# Patient Record
Sex: FEMALE | Race: WHITE | Employment: OTHER | URBAN - NONMETROPOLITAN AREA
[De-identification: names, ages, dates, MRNs, and addresses within clinical notes are randomized per-mention and may not be internally consistent; named-entity substitution may affect disease eponyms.]

---

## 2017-01-13 PROBLEM — R10.13 EPIGASTRIC PAIN: Status: ACTIVE | Noted: 2017-01-13

## 2017-01-13 PROBLEM — R63.4 WEIGHT LOSS: Status: ACTIVE | Noted: 2017-01-13

## 2017-04-07 ENCOUNTER — HOSPITAL ENCOUNTER (INPATIENT)
Age: 67
LOS: 3 days | Discharge: HOME | DRG: 193 | End: 2017-04-10
Attending: INTERNAL MEDICINE | Admitting: INTERNAL MEDICINE
Payer: MEDICARE

## 2017-04-07 ENCOUNTER — APPOINTMENT (OUTPATIENT)
Dept: GENERAL RADIOLOGY | Age: 67
DRG: 193 | End: 2017-04-07
Attending: INTERNAL MEDICINE
Payer: MEDICARE

## 2017-04-07 DIAGNOSIS — J18.9 CAP (COMMUNITY ACQUIRED PNEUMONIA): Primary | ICD-10-CM

## 2017-04-07 DIAGNOSIS — J96.01 ACUTE RESPIRATORY FAILURE WITH HYPOXIA (HCC): ICD-10-CM

## 2017-04-07 DIAGNOSIS — M41.80 DEXTROSCOLIOSIS: Chronic | ICD-10-CM

## 2017-04-07 LAB
-: ABNORMAL
ABSOLUTE EOS #: 0 K/UL (ref 0–0.4)
ABSOLUTE LYMPH #: 0.7 K/UL (ref 1–4.8)
ABSOLUTE MONO #: 0.6 K/UL (ref 0–1)
ALBUMIN SERPL-MCNC: 3.7 G/DL (ref 3.5–5.2)
ALBUMIN/GLOBULIN RATIO: 1.1 (ref 1–2.5)
ALLEN TEST: ABNORMAL
ALP BLD-CCNC: 44 U/L (ref 35–104)
ALT SERPL-CCNC: 15 U/L (ref 5–33)
AMORPHOUS: ABNORMAL
ANION GAP SERPL CALCULATED.3IONS-SCNC: 13 MMOL/L (ref 9–17)
AST SERPL-CCNC: 22 U/L
BACTERIA: ABNORMAL
BASOPHILS # BLD: 0 % (ref 0–2)
BASOPHILS ABSOLUTE: 0 K/UL (ref 0–0.2)
BILIRUB SERPL-MCNC: 0.41 MG/DL (ref 0.3–1.2)
BILIRUBIN URINE: NEGATIVE
BUN BLDV-MCNC: 13 MG/DL (ref 8–23)
BUN/CREAT BLD: 24 (ref 9–20)
CALCIUM SERPL-MCNC: 8.6 MG/DL (ref 8.6–10.4)
CARBOXYHEMOGLOBIN: ABNORMAL % (ref 0–5)
CASTS UA: ABNORMAL /LPF
CHLORIDE BLD-SCNC: 96 MMOL/L (ref 98–107)
CO2: 28 MMOL/L (ref 20–31)
COLOR: YELLOW
COMMENT UA: ABNORMAL
CREAT SERPL-MCNC: 0.54 MG/DL (ref 0.5–0.9)
CRYSTALS, UA: ABNORMAL /HPF
DIFFERENTIAL TYPE: ABNORMAL
DIRECT EXAM: NORMAL
EOSINOPHILS RELATIVE PERCENT: 0 % (ref 0–8)
EPITHELIAL CELLS UA: ABNORMAL /HPF (ref 0–25)
FIO2: ABNORMAL
GFR AFRICAN AMERICAN: >60 ML/MIN
GFR NON-AFRICAN AMERICAN: >60 ML/MIN
GFR SERPL CREATININE-BSD FRML MDRD: ABNORMAL ML/MIN/{1.73_M2}
GFR SERPL CREATININE-BSD FRML MDRD: ABNORMAL ML/MIN/{1.73_M2}
GLUCOSE BLD-MCNC: 122 MG/DL (ref 70–99)
GLUCOSE URINE: NEGATIVE
HCO3 ARTERIAL: 29.3 MMOL/L (ref 22–26)
HCT VFR BLD CALC: 40.2 % (ref 36–46)
HEMOGLOBIN: 13.4 G/DL (ref 12–16)
KETONES, URINE: NEGATIVE
LEUKOCYTE ESTERASE, URINE: NEGATIVE
LYMPHOCYTES # BLD: 13 % (ref 24–44)
Lab: NORMAL
MCH RBC QN AUTO: 31.8 PG (ref 26–34)
MCHC RBC AUTO-ENTMCNC: 33.3 G/DL (ref 31–37)
MCV RBC AUTO: 95.6 FL (ref 80–100)
METHEMOGLOBIN: ABNORMAL % (ref 0–1.9)
MODE: ABNORMAL
MONOCYTES # BLD: 11 % (ref 0–12)
MUCUS: ABNORMAL
NEGATIVE BASE EXCESS, ART: ABNORMAL MMOL/L (ref 0–2)
NITRITE, URINE: NEGATIVE
NOTIFICATION TIME: ABNORMAL
NOTIFICATION: ABNORMAL
O2 DEVICE/FLOW/%: ABNORMAL
O2 SAT, ARTERIAL: 89.5 % (ref 95–98)
OTHER OBSERVATIONS UA: ABNORMAL
OXYHEMOGLOBIN: ABNORMAL % (ref 95–98)
PATIENT TEMP: ABNORMAL
PCO2 ARTERIAL: 45.2 MMHG (ref 35–45)
PCO2, ART, TEMP ADJ: ABNORMAL
PDW BLD-RTO: 16.5 % (ref 12.1–15.2)
PEEP/CPAP: ABNORMAL
PH ARTERIAL: 7.43 (ref 7.35–7.45)
PH UA: 6 (ref 5–9)
PH, ART, TEMP ADJ: ABNORMAL (ref 7.35–7.45)
PLATELET # BLD: 179 K/UL (ref 140–450)
PLATELET ESTIMATE: ABNORMAL
PMV BLD AUTO: ABNORMAL FL (ref 6–12)
PO2 ARTERIAL: 55.4 MMHG (ref 80–100)
PO2, ART, TEMP ADJ: ABNORMAL MMHG (ref 80–100)
POSITIVE BASE EXCESS, ART: 4.2 MMOL/L (ref 0–2)
POTASSIUM SERPL-SCNC: 3.8 MMOL/L (ref 3.7–5.3)
PROTEIN UA: ABNORMAL
PSV: ABNORMAL
PT. POSITION: ABNORMAL
RBC # BLD: 4.2 M/UL (ref 4–5.2)
RBC # BLD: ABNORMAL 10*6/UL
RBC UA: ABNORMAL /HPF (ref 0–2)
RENAL EPITHELIAL, UA: ABNORMAL /HPF
RESPIRATORY RATE: ABNORMAL
SAMPLE SITE: ABNORMAL
SEG NEUTROPHILS: 76 % (ref 36–66)
SEGMENTED NEUTROPHILS ABSOLUTE COUNT: 4.2 K/UL (ref 1.8–7.7)
SET RATE: ABNORMAL
SODIUM BLD-SCNC: 137 MMOL/L (ref 135–144)
SPECIFIC GRAVITY UA: 1.02 (ref 1.01–1.02)
SPECIMEN DESCRIPTION: NORMAL
STATUS: NORMAL
TEXT FOR RESPIRATORY: ABNORMAL
TOTAL HB: ABNORMAL G/DL (ref 12–16)
TOTAL PROTEIN: 7.1 G/DL (ref 6.4–8.3)
TOTAL RATE: ABNORMAL
TRICHOMONAS: ABNORMAL
TROPONIN INTERP: NORMAL
TROPONIN T: <0.03 NG/ML
TURBIDITY: CLEAR
URINE HGB: ABNORMAL
UROBILINOGEN, URINE: NORMAL
VT: ABNORMAL
WBC # BLD: 5.6 K/UL (ref 3.5–11)
WBC # BLD: ABNORMAL 10*3/UL
WBC UA: ABNORMAL /HPF (ref 0–5)
YEAST: ABNORMAL

## 2017-04-07 PROCEDURE — G8996 SWALLOW CURRENT STATUS: HCPCS

## 2017-04-07 PROCEDURE — 87899 AGENT NOS ASSAY W/OPTIC: CPT

## 2017-04-07 PROCEDURE — 97530 THERAPEUTIC ACTIVITIES: CPT

## 2017-04-07 PROCEDURE — 87449 NOS EACH ORGANISM AG IA: CPT

## 2017-04-07 PROCEDURE — G8978 MOBILITY CURRENT STATUS: HCPCS

## 2017-04-07 PROCEDURE — 6360000002 HC RX W HCPCS: Performed by: PHYSICIAN ASSISTANT

## 2017-04-07 PROCEDURE — 85025 COMPLETE CBC W/AUTO DIFF WBC: CPT

## 2017-04-07 PROCEDURE — G8979 MOBILITY GOAL STATUS: HCPCS

## 2017-04-07 PROCEDURE — G8998 SWALLOW D/C STATUS: HCPCS

## 2017-04-07 PROCEDURE — 6370000000 HC RX 637 (ALT 250 FOR IP): Performed by: PHYSICIAN ASSISTANT

## 2017-04-07 PROCEDURE — 87086 URINE CULTURE/COLONY COUNT: CPT

## 2017-04-07 PROCEDURE — 87070 CULTURE OTHR SPECIMN AEROBIC: CPT

## 2017-04-07 PROCEDURE — 1200000000 HC SEMI PRIVATE

## 2017-04-07 PROCEDURE — 93005 ELECTROCARDIOGRAM TRACING: CPT

## 2017-04-07 PROCEDURE — 80053 COMPREHEN METABOLIC PANEL: CPT

## 2017-04-07 PROCEDURE — 94664 DEMO&/EVAL PT USE INHALER: CPT

## 2017-04-07 PROCEDURE — G8997 SWALLOW GOAL STATUS: HCPCS

## 2017-04-07 PROCEDURE — 82805 BLOOD GASES W/O2 SATURATION: CPT

## 2017-04-07 PROCEDURE — 81001 URINALYSIS AUTO W/SCOPE: CPT

## 2017-04-07 PROCEDURE — 94640 AIRWAY INHALATION TREATMENT: CPT

## 2017-04-07 PROCEDURE — G8988 SELF CARE GOAL STATUS: HCPCS

## 2017-04-07 PROCEDURE — 87040 BLOOD CULTURE FOR BACTERIA: CPT

## 2017-04-07 PROCEDURE — 92610 EVALUATE SWALLOWING FUNCTION: CPT

## 2017-04-07 PROCEDURE — 84484 ASSAY OF TROPONIN QUANT: CPT

## 2017-04-07 PROCEDURE — 94667 MNPJ CHEST WALL 1ST: CPT

## 2017-04-07 PROCEDURE — 36415 COLL VENOUS BLD VENIPUNCTURE: CPT

## 2017-04-07 PROCEDURE — 36600 WITHDRAWAL OF ARTERIAL BLOOD: CPT

## 2017-04-07 PROCEDURE — 2580000003 HC RX 258: Performed by: PHYSICIAN ASSISTANT

## 2017-04-07 PROCEDURE — 94668 MNPJ CHEST WALL SBSQ: CPT

## 2017-04-07 PROCEDURE — 97161 PT EVAL LOW COMPLEX 20 MIN: CPT

## 2017-04-07 PROCEDURE — 71020 XR CHEST STANDARD TWO VW: CPT

## 2017-04-07 PROCEDURE — G8987 SELF CARE CURRENT STATUS: HCPCS

## 2017-04-07 PROCEDURE — 87804 INFLUENZA ASSAY W/OPTIC: CPT

## 2017-04-07 PROCEDURE — 6370000000 HC RX 637 (ALT 250 FOR IP): Performed by: INTERNAL MEDICINE

## 2017-04-07 PROCEDURE — 87205 SMEAR GRAM STAIN: CPT

## 2017-04-07 RX ORDER — BENZONATATE 100 MG/1
100 CAPSULE ORAL 3 TIMES DAILY PRN
Status: DISCONTINUED | OUTPATIENT
Start: 2017-04-07 | End: 2017-04-10 | Stop reason: HOSPADM

## 2017-04-07 RX ORDER — SODIUM CHLORIDE 9 MG/ML
INJECTION, SOLUTION INTRAVENOUS CONTINUOUS
Status: DISCONTINUED | OUTPATIENT
Start: 2017-04-07 | End: 2017-04-10

## 2017-04-07 RX ORDER — OYSTER SHELL CALCIUM WITH VITAMIN D 500; 200 MG/1; [IU]/1
1 TABLET, FILM COATED ORAL DAILY
Status: DISCONTINUED | OUTPATIENT
Start: 2017-04-07 | End: 2017-04-10 | Stop reason: HOSPADM

## 2017-04-07 RX ORDER — LEVOFLOXACIN 5 MG/ML
750 INJECTION, SOLUTION INTRAVENOUS EVERY 24 HOURS
Status: DISCONTINUED | OUTPATIENT
Start: 2017-04-07 | End: 2017-04-10 | Stop reason: HOSPADM

## 2017-04-07 RX ORDER — SODIUM CHLORIDE 0.9 % (FLUSH) 0.9 %
10 SYRINGE (ML) INJECTION PRN
Status: DISCONTINUED | OUTPATIENT
Start: 2017-04-07 | End: 2017-04-10 | Stop reason: HOSPADM

## 2017-04-07 RX ORDER — ALBUTEROL SULFATE 2.5 MG/3ML
2.5 SOLUTION RESPIRATORY (INHALATION) EVERY 4 HOURS PRN
Status: DISCONTINUED | OUTPATIENT
Start: 2017-04-07 | End: 2017-04-10 | Stop reason: HOSPADM

## 2017-04-07 RX ORDER — FOLIC ACID 1 MG/1
1 TABLET ORAL DAILY
Status: DISCONTINUED | OUTPATIENT
Start: 2017-04-07 | End: 2017-04-10 | Stop reason: HOSPADM

## 2017-04-07 RX ORDER — LEVOTHYROXINE SODIUM 0.05 MG/1
50 TABLET ORAL DAILY
Status: DISCONTINUED | OUTPATIENT
Start: 2017-04-07 | End: 2017-04-07

## 2017-04-07 RX ORDER — SODIUM CHLORIDE 0.9 % (FLUSH) 0.9 %
10 SYRINGE (ML) INJECTION EVERY 12 HOURS SCHEDULED
Status: DISCONTINUED | OUTPATIENT
Start: 2017-04-07 | End: 2017-04-10 | Stop reason: HOSPADM

## 2017-04-07 RX ORDER — LEVOTHYROXINE SODIUM 0.1 MG/1
200 TABLET ORAL DAILY
Status: DISCONTINUED | OUTPATIENT
Start: 2017-04-07 | End: 2017-04-07

## 2017-04-07 RX ORDER — RALOXIFENE HYDROCHLORIDE 60 MG/1
60 TABLET, FILM COATED ORAL DAILY
Status: DISCONTINUED | OUTPATIENT
Start: 2017-04-07 | End: 2017-04-07 | Stop reason: SDUPTHER

## 2017-04-07 RX ORDER — IPRATROPIUM BROMIDE AND ALBUTEROL SULFATE 2.5; .5 MG/3ML; MG/3ML
1 SOLUTION RESPIRATORY (INHALATION) 4 TIMES DAILY
Status: DISCONTINUED | OUTPATIENT
Start: 2017-04-07 | End: 2017-04-10 | Stop reason: HOSPADM

## 2017-04-07 RX ORDER — LEVOTHYROXINE SODIUM 0.1 MG/1
200 TABLET ORAL DAILY
Status: DISCONTINUED | OUTPATIENT
Start: 2017-04-07 | End: 2017-04-10 | Stop reason: HOSPADM

## 2017-04-07 RX ORDER — ONDANSETRON 2 MG/ML
4 INJECTION INTRAMUSCULAR; INTRAVENOUS EVERY 6 HOURS PRN
Status: DISCONTINUED | OUTPATIENT
Start: 2017-04-07 | End: 2017-04-10 | Stop reason: HOSPADM

## 2017-04-07 RX ORDER — LEVOTHYROXINE SODIUM 0.12 MG/1
250 TABLET ORAL DAILY
Status: DISCONTINUED | OUTPATIENT
Start: 2017-04-07 | End: 2017-04-07

## 2017-04-07 RX ORDER — FAMOTIDINE 20 MG/1
20 TABLET, FILM COATED ORAL 2 TIMES DAILY
Status: DISCONTINUED | OUTPATIENT
Start: 2017-04-07 | End: 2017-04-10 | Stop reason: HOSPADM

## 2017-04-07 RX ORDER — METHYLPREDNISOLONE SODIUM SUCCINATE 40 MG/ML
40 INJECTION, POWDER, LYOPHILIZED, FOR SOLUTION INTRAMUSCULAR; INTRAVENOUS EVERY 8 HOURS
Status: DISCONTINUED | OUTPATIENT
Start: 2017-04-07 | End: 2017-04-10 | Stop reason: HOSPADM

## 2017-04-07 RX ORDER — ACETAMINOPHEN 325 MG/1
650 TABLET ORAL EVERY 4 HOURS PRN
Status: DISCONTINUED | OUTPATIENT
Start: 2017-04-07 | End: 2017-04-10 | Stop reason: HOSPADM

## 2017-04-07 RX ORDER — IPRATROPIUM BROMIDE AND ALBUTEROL SULFATE 2.5; .5 MG/3ML; MG/3ML
1 SOLUTION RESPIRATORY (INHALATION)
Status: DISCONTINUED | OUTPATIENT
Start: 2017-04-07 | End: 2017-04-07

## 2017-04-07 RX ADMIN — SODIUM CHLORIDE: 9 INJECTION, SOLUTION INTRAVENOUS at 15:14

## 2017-04-07 RX ADMIN — METHYLPREDNISOLONE SODIUM SUCCINATE 40 MG: 40 INJECTION, POWDER, LYOPHILIZED, FOR SOLUTION INTRAMUSCULAR; INTRAVENOUS at 14:00

## 2017-04-07 RX ADMIN — METHYLPREDNISOLONE SODIUM SUCCINATE 40 MG: 40 INJECTION, POWDER, LYOPHILIZED, FOR SOLUTION INTRAMUSCULAR; INTRAVENOUS at 21:20

## 2017-04-07 RX ADMIN — LEVOFLOXACIN 750 MG: 5 INJECTION, SOLUTION INTRAVENOUS at 14:00

## 2017-04-07 RX ADMIN — ONDANSETRON 4 MG: 2 INJECTION INTRAMUSCULAR; INTRAVENOUS at 15:09

## 2017-04-07 RX ADMIN — FAMOTIDINE 20 MG: 20 TABLET ORAL at 21:20

## 2017-04-07 RX ADMIN — IPRATROPIUM BROMIDE AND ALBUTEROL SULFATE 1 AMPULE: .5; 3 SOLUTION RESPIRATORY (INHALATION) at 20:56

## 2017-04-07 RX ADMIN — IPRATROPIUM BROMIDE AND ALBUTEROL SULFATE 1 AMPULE: .5; 3 SOLUTION RESPIRATORY (INHALATION) at 15:59

## 2017-04-07 RX ADMIN — BENZONATATE 100 MG: 100 CAPSULE ORAL at 15:09

## 2017-04-08 LAB
ABSOLUTE BANDS #: 0.11 K/UL (ref 0–1)
ABSOLUTE EOS #: 0 K/UL (ref 0–0.4)
ABSOLUTE LYMPH #: 0.19 K/UL (ref 1–4.8)
ABSOLUTE MONO #: 0.11 K/UL (ref 0–1)
ANION GAP SERPL CALCULATED.3IONS-SCNC: 7 MMOL/L (ref 9–17)
BANDS: 6 % (ref 0–10)
BASOPHILS # BLD: 0 % (ref 0–2)
BASOPHILS ABSOLUTE: 0 K/UL (ref 0–0.2)
BUN BLDV-MCNC: 10 MG/DL (ref 8–23)
BUN/CREAT BLD: 17 (ref 9–20)
CALCIUM SERPL-MCNC: 8.3 MG/DL (ref 8.6–10.4)
CHLORIDE BLD-SCNC: 97 MMOL/L (ref 98–107)
CO2: 31 MMOL/L (ref 20–31)
CREAT SERPL-MCNC: 0.59 MG/DL (ref 0.5–0.9)
CULTURE: NORMAL
CULTURE: NORMAL
DIFFERENTIAL TYPE: ABNORMAL
EOSINOPHILS RELATIVE PERCENT: 0 % (ref 0–8)
GFR AFRICAN AMERICAN: >60 ML/MIN
GFR NON-AFRICAN AMERICAN: >60 ML/MIN
GFR SERPL CREATININE-BSD FRML MDRD: ABNORMAL ML/MIN/{1.73_M2}
GFR SERPL CREATININE-BSD FRML MDRD: ABNORMAL ML/MIN/{1.73_M2}
GLUCOSE BLD-MCNC: 139 MG/DL (ref 70–99)
HCT VFR BLD CALC: 37.5 % (ref 36–46)
HEMOGLOBIN: 12.5 G/DL (ref 12–16)
LYMPHOCYTES # BLD: 10 % (ref 24–44)
Lab: NORMAL
Lab: NORMAL
MCH RBC QN AUTO: 31.8 PG (ref 26–34)
MCHC RBC AUTO-ENTMCNC: 33.3 G/DL (ref 31–37)
MCV RBC AUTO: 95.6 FL (ref 80–100)
MONOCYTES # BLD: 6 % (ref 0–12)
MORPHOLOGY: NORMAL
PDW BLD-RTO: 17.1 % (ref 12.1–15.2)
PLATELET # BLD: 146 K/UL (ref 140–450)
PLATELET ESTIMATE: ABNORMAL
PMV BLD AUTO: ABNORMAL FL (ref 6–12)
POTASSIUM SERPL-SCNC: 4.9 MMOL/L (ref 3.7–5.3)
RBC # BLD: 3.92 M/UL (ref 4–5.2)
RBC # BLD: ABNORMAL 10*6/UL
SEG NEUTROPHILS: 78 % (ref 36–66)
SEGMENTED NEUTROPHILS ABSOLUTE COUNT: 1.49 K/UL (ref 1.8–7.7)
SODIUM BLD-SCNC: 135 MMOL/L (ref 135–144)
SPECIMEN DESCRIPTION: NORMAL
SPECIMEN DESCRIPTION: NORMAL
STATUS: NORMAL
TROPONIN INTERP: NORMAL
TROPONIN T: <0.03 NG/ML
WBC # BLD: 1.9 K/UL (ref 3.5–11)
WBC # BLD: ABNORMAL 10*3/UL

## 2017-04-08 PROCEDURE — 94668 MNPJ CHEST WALL SBSQ: CPT

## 2017-04-08 PROCEDURE — 80048 BASIC METABOLIC PNL TOTAL CA: CPT

## 2017-04-08 PROCEDURE — 94664 DEMO&/EVAL PT USE INHALER: CPT

## 2017-04-08 PROCEDURE — 94762 N-INVAS EAR/PLS OXIMTRY CONT: CPT

## 2017-04-08 PROCEDURE — 2580000003 HC RX 258: Performed by: INTERNAL MEDICINE

## 2017-04-08 PROCEDURE — 97110 THERAPEUTIC EXERCISES: CPT

## 2017-04-08 PROCEDURE — 6370000000 HC RX 637 (ALT 250 FOR IP): Performed by: INTERNAL MEDICINE

## 2017-04-08 PROCEDURE — 97530 THERAPEUTIC ACTIVITIES: CPT

## 2017-04-08 PROCEDURE — 36415 COLL VENOUS BLD VENIPUNCTURE: CPT

## 2017-04-08 PROCEDURE — 6360000002 HC RX W HCPCS: Performed by: PHYSICIAN ASSISTANT

## 2017-04-08 PROCEDURE — 94640 AIRWAY INHALATION TREATMENT: CPT

## 2017-04-08 PROCEDURE — 85025 COMPLETE CBC W/AUTO DIFF WBC: CPT

## 2017-04-08 PROCEDURE — 6370000000 HC RX 637 (ALT 250 FOR IP): Performed by: PHYSICIAN ASSISTANT

## 2017-04-08 PROCEDURE — 1200000000 HC SEMI PRIVATE

## 2017-04-08 RX ADMIN — FOLIC ACID 1 MG: 1 TABLET ORAL at 10:04

## 2017-04-08 RX ADMIN — ACETAMINOPHEN 650 MG: 325 TABLET, FILM COATED ORAL at 04:57

## 2017-04-08 RX ADMIN — FAMOTIDINE 20 MG: 20 TABLET ORAL at 21:39

## 2017-04-08 RX ADMIN — SODIUM CHLORIDE: 9 INJECTION, SOLUTION INTRAVENOUS at 08:28

## 2017-04-08 RX ADMIN — ENOXAPARIN SODIUM 40 MG: 40 INJECTION SUBCUTANEOUS at 10:05

## 2017-04-08 RX ADMIN — METHYLPREDNISOLONE SODIUM SUCCINATE 40 MG: 40 INJECTION, POWDER, LYOPHILIZED, FOR SOLUTION INTRAMUSCULAR; INTRAVENOUS at 13:26

## 2017-04-08 RX ADMIN — SODIUM CHLORIDE: 9 INJECTION, SOLUTION INTRAVENOUS at 07:33

## 2017-04-08 RX ADMIN — METHYLPREDNISOLONE SODIUM SUCCINATE 40 MG: 40 INJECTION, POWDER, LYOPHILIZED, FOR SOLUTION INTRAMUSCULAR; INTRAVENOUS at 04:05

## 2017-04-08 RX ADMIN — METHYLPREDNISOLONE SODIUM SUCCINATE 40 MG: 40 INJECTION, POWDER, LYOPHILIZED, FOR SOLUTION INTRAMUSCULAR; INTRAVENOUS at 21:39

## 2017-04-08 RX ADMIN — BENZONATATE 100 MG: 100 CAPSULE ORAL at 07:04

## 2017-04-08 RX ADMIN — IPRATROPIUM BROMIDE AND ALBUTEROL SULFATE 1 AMPULE: .5; 3 SOLUTION RESPIRATORY (INHALATION) at 15:34

## 2017-04-08 RX ADMIN — IPRATROPIUM BROMIDE AND ALBUTEROL SULFATE 1 AMPULE: .5; 3 SOLUTION RESPIRATORY (INHALATION) at 11:17

## 2017-04-08 RX ADMIN — IPRATROPIUM BROMIDE AND ALBUTEROL SULFATE 1 AMPULE: .5; 3 SOLUTION RESPIRATORY (INHALATION) at 21:13

## 2017-04-08 RX ADMIN — CALCIUM CARBONATE-VITAMIN D TAB 500 MG-200 UNIT 1 TABLET: 500-200 TAB at 10:04

## 2017-04-08 RX ADMIN — LEVOFLOXACIN 750 MG: 5 INJECTION, SOLUTION INTRAVENOUS at 13:26

## 2017-04-08 RX ADMIN — FAMOTIDINE 20 MG: 20 TABLET ORAL at 10:04

## 2017-04-08 RX ADMIN — IPRATROPIUM BROMIDE AND ALBUTEROL SULFATE 1 AMPULE: .5; 3 SOLUTION RESPIRATORY (INHALATION) at 05:50

## 2017-04-08 RX ADMIN — LEVOTHYROXINE SODIUM 200 MCG: 100 TABLET ORAL at 07:04

## 2017-04-08 ASSESSMENT — PAIN SCALES - GENERAL: PAINLEVEL_OUTOF10: 3

## 2017-04-09 LAB
ABSOLUTE BANDS #: 0.25 K/UL (ref 0–1)
ABSOLUTE EOS #: 0 K/UL (ref 0–0.4)
ABSOLUTE LYMPH #: 0.36 K/UL (ref 1–4.8)
ABSOLUTE MONO #: 0.14 K/UL (ref 0–1)
ANION GAP SERPL CALCULATED.3IONS-SCNC: 7 MMOL/L (ref 9–17)
BANDS: 9 % (ref 0–10)
BASOPHILS # BLD: 0 % (ref 0–2)
BASOPHILS ABSOLUTE: 0 K/UL (ref 0–0.2)
BUN BLDV-MCNC: 12 MG/DL (ref 8–23)
BUN/CREAT BLD: 21 (ref 9–20)
CALCIUM SERPL-MCNC: 8.5 MG/DL (ref 8.6–10.4)
CHLORIDE BLD-SCNC: 100 MMOL/L (ref 98–107)
CO2: 32 MMOL/L (ref 20–31)
CREAT SERPL-MCNC: 0.57 MG/DL (ref 0.5–0.9)
DIFFERENTIAL TYPE: ABNORMAL
EOSINOPHILS RELATIVE PERCENT: 0 % (ref 0–8)
GFR AFRICAN AMERICAN: >60 ML/MIN
GFR NON-AFRICAN AMERICAN: >60 ML/MIN
GFR SERPL CREATININE-BSD FRML MDRD: ABNORMAL ML/MIN/{1.73_M2}
GFR SERPL CREATININE-BSD FRML MDRD: ABNORMAL ML/MIN/{1.73_M2}
GLUCOSE BLD-MCNC: 139 MG/DL (ref 70–99)
HCT VFR BLD CALC: 37.1 % (ref 36–46)
HEMOGLOBIN: 12.3 G/DL (ref 12–16)
LYMPHOCYTES # BLD: 13 % (ref 24–44)
MCH RBC QN AUTO: 31.4 PG (ref 26–34)
MCHC RBC AUTO-ENTMCNC: 33.1 G/DL (ref 31–37)
MCV RBC AUTO: 95 FL (ref 80–100)
MONOCYTES # BLD: 5 % (ref 0–12)
MORPHOLOGY: NORMAL
PDW BLD-RTO: 16.7 % (ref 12.1–15.2)
PLATELET # BLD: 176 K/UL (ref 140–450)
PLATELET ESTIMATE: ABNORMAL
PMV BLD AUTO: ABNORMAL FL (ref 6–12)
POTASSIUM SERPL-SCNC: 4.7 MMOL/L (ref 3.7–5.3)
RBC # BLD: 3.91 M/UL (ref 4–5.2)
RBC # BLD: ABNORMAL 10*6/UL
SEG NEUTROPHILS: 73 % (ref 36–66)
SEGMENTED NEUTROPHILS ABSOLUTE COUNT: 2.05 K/UL (ref 1.8–7.7)
SODIUM BLD-SCNC: 139 MMOL/L (ref 135–144)
WBC # BLD: 2.8 K/UL (ref 3.5–11)
WBC # BLD: ABNORMAL 10*3/UL

## 2017-04-09 PROCEDURE — 6360000002 HC RX W HCPCS: Performed by: PHYSICIAN ASSISTANT

## 2017-04-09 PROCEDURE — 6370000000 HC RX 637 (ALT 250 FOR IP): Performed by: INTERNAL MEDICINE

## 2017-04-09 PROCEDURE — 94668 MNPJ CHEST WALL SBSQ: CPT

## 2017-04-09 PROCEDURE — 85025 COMPLETE CBC W/AUTO DIFF WBC: CPT

## 2017-04-09 PROCEDURE — 97110 THERAPEUTIC EXERCISES: CPT

## 2017-04-09 PROCEDURE — 1200000000 HC SEMI PRIVATE

## 2017-04-09 PROCEDURE — 94640 AIRWAY INHALATION TREATMENT: CPT

## 2017-04-09 PROCEDURE — 94664 DEMO&/EVAL PT USE INHALER: CPT

## 2017-04-09 PROCEDURE — 80048 BASIC METABOLIC PNL TOTAL CA: CPT

## 2017-04-09 PROCEDURE — 36415 COLL VENOUS BLD VENIPUNCTURE: CPT

## 2017-04-09 PROCEDURE — 97530 THERAPEUTIC ACTIVITIES: CPT

## 2017-04-09 PROCEDURE — 6370000000 HC RX 637 (ALT 250 FOR IP): Performed by: PHYSICIAN ASSISTANT

## 2017-04-09 RX ADMIN — FAMOTIDINE 20 MG: 20 TABLET ORAL at 20:23

## 2017-04-09 RX ADMIN — METHYLPREDNISOLONE SODIUM SUCCINATE 40 MG: 40 INJECTION, POWDER, LYOPHILIZED, FOR SOLUTION INTRAMUSCULAR; INTRAVENOUS at 05:36

## 2017-04-09 RX ADMIN — ENOXAPARIN SODIUM 40 MG: 40 INJECTION SUBCUTANEOUS at 08:34

## 2017-04-09 RX ADMIN — LEVOFLOXACIN 750 MG: 5 INJECTION, SOLUTION INTRAVENOUS at 12:45

## 2017-04-09 RX ADMIN — IPRATROPIUM BROMIDE AND ALBUTEROL SULFATE 1 AMPULE: .5; 3 SOLUTION RESPIRATORY (INHALATION) at 10:54

## 2017-04-09 RX ADMIN — METHYLPREDNISOLONE SODIUM SUCCINATE 40 MG: 40 INJECTION, POWDER, LYOPHILIZED, FOR SOLUTION INTRAMUSCULAR; INTRAVENOUS at 20:23

## 2017-04-09 RX ADMIN — FOLIC ACID 1 MG: 1 TABLET ORAL at 08:34

## 2017-04-09 RX ADMIN — LEVOTHYROXINE SODIUM 200 MCG: 100 TABLET ORAL at 08:34

## 2017-04-09 RX ADMIN — FAMOTIDINE 20 MG: 20 TABLET ORAL at 08:34

## 2017-04-09 RX ADMIN — CALCIUM CARBONATE-VITAMIN D TAB 500 MG-200 UNIT 1 TABLET: 500-200 TAB at 08:34

## 2017-04-09 RX ADMIN — BENZONATATE 100 MG: 100 CAPSULE ORAL at 12:45

## 2017-04-09 RX ADMIN — METHYLPREDNISOLONE SODIUM SUCCINATE 40 MG: 40 INJECTION, POWDER, LYOPHILIZED, FOR SOLUTION INTRAMUSCULAR; INTRAVENOUS at 12:45

## 2017-04-09 RX ADMIN — IPRATROPIUM BROMIDE AND ALBUTEROL SULFATE 1 AMPULE: .5; 3 SOLUTION RESPIRATORY (INHALATION) at 21:07

## 2017-04-09 RX ADMIN — IPRATROPIUM BROMIDE AND ALBUTEROL SULFATE 1 AMPULE: .5; 3 SOLUTION RESPIRATORY (INHALATION) at 16:21

## 2017-04-10 VITALS
WEIGHT: 147.5 LBS | HEIGHT: 62 IN | DIASTOLIC BLOOD PRESSURE: 79 MMHG | BODY MASS INDEX: 27.14 KG/M2 | HEART RATE: 76 BPM | OXYGEN SATURATION: 91 % | RESPIRATION RATE: 16 BRPM | TEMPERATURE: 98.6 F | SYSTOLIC BLOOD PRESSURE: 154 MMHG

## 2017-04-10 PROBLEM — J96.01 ACUTE RESPIRATORY FAILURE WITH HYPOXIA (HCC): Status: ACTIVE | Noted: 2017-04-10

## 2017-04-10 PROBLEM — M41.80 DEXTROSCOLIOSIS: Chronic | Status: ACTIVE | Noted: 2017-04-10

## 2017-04-10 LAB
ABSOLUTE EOS #: 0 K/UL (ref 0–0.4)
ABSOLUTE LYMPH #: 0.6 K/UL (ref 1–4.8)
ABSOLUTE MONO #: 0.4 K/UL (ref 0–1)
ANION GAP SERPL CALCULATED.3IONS-SCNC: 7 MMOL/L (ref 9–17)
BASOPHILS # BLD: 0 % (ref 0–2)
BASOPHILS ABSOLUTE: 0 K/UL (ref 0–0.2)
BUN BLDV-MCNC: 13 MG/DL (ref 8–23)
BUN/CREAT BLD: 24 (ref 9–20)
CALCIUM SERPL-MCNC: 8.2 MG/DL (ref 8.6–10.4)
CHLORIDE BLD-SCNC: 99 MMOL/L (ref 98–107)
CO2: 32 MMOL/L (ref 20–31)
CREAT SERPL-MCNC: 0.55 MG/DL (ref 0.5–0.9)
DIFFERENTIAL TYPE: ABNORMAL
EOSINOPHILS RELATIVE PERCENT: 0 % (ref 0–8)
GFR AFRICAN AMERICAN: >60 ML/MIN
GFR NON-AFRICAN AMERICAN: >60 ML/MIN
GFR SERPL CREATININE-BSD FRML MDRD: ABNORMAL ML/MIN/{1.73_M2}
GFR SERPL CREATININE-BSD FRML MDRD: ABNORMAL ML/MIN/{1.73_M2}
GLUCOSE BLD-MCNC: 125 MG/DL (ref 70–99)
HCT VFR BLD CALC: 36.2 % (ref 36–46)
HEMOGLOBIN: 12.2 G/DL (ref 12–16)
LYMPHOCYTES # BLD: 15 % (ref 24–44)
MCH RBC QN AUTO: 32.5 PG (ref 26–34)
MCHC RBC AUTO-ENTMCNC: 33.6 G/DL (ref 31–37)
MCV RBC AUTO: 96.7 FL (ref 80–100)
MONOCYTES # BLD: 10 % (ref 0–12)
PDW BLD-RTO: 16.2 % (ref 12.1–15.2)
PLATELET # BLD: 194 K/UL (ref 140–450)
PLATELET ESTIMATE: ABNORMAL
PMV BLD AUTO: ABNORMAL FL (ref 6–12)
POTASSIUM SERPL-SCNC: 4.2 MMOL/L (ref 3.7–5.3)
RBC # BLD: 3.74 M/UL (ref 4–5.2)
RBC # BLD: ABNORMAL 10*6/UL
SEG NEUTROPHILS: 75 % (ref 36–66)
SEGMENTED NEUTROPHILS ABSOLUTE COUNT: 2.8 K/UL (ref 1.8–7.7)
SODIUM BLD-SCNC: 138 MMOL/L (ref 135–144)
WBC # BLD: 3.8 K/UL (ref 3.5–11)
WBC # BLD: ABNORMAL 10*3/UL

## 2017-04-10 PROCEDURE — 85025 COMPLETE CBC W/AUTO DIFF WBC: CPT

## 2017-04-10 PROCEDURE — 94620 HC 6-MINUTE WALK TEST/PULM STRESS TEST SIMPLE: CPT

## 2017-04-10 PROCEDURE — 94640 AIRWAY INHALATION TREATMENT: CPT

## 2017-04-10 PROCEDURE — 2580000003 HC RX 258: Performed by: PHYSICIAN ASSISTANT

## 2017-04-10 PROCEDURE — 94664 DEMO&/EVAL PT USE INHALER: CPT

## 2017-04-10 PROCEDURE — 6360000002 HC RX W HCPCS: Performed by: PHYSICIAN ASSISTANT

## 2017-04-10 PROCEDURE — 97110 THERAPEUTIC EXERCISES: CPT

## 2017-04-10 PROCEDURE — 6370000000 HC RX 637 (ALT 250 FOR IP): Performed by: PHYSICIAN ASSISTANT

## 2017-04-10 PROCEDURE — 80048 BASIC METABOLIC PNL TOTAL CA: CPT

## 2017-04-10 PROCEDURE — 36415 COLL VENOUS BLD VENIPUNCTURE: CPT

## 2017-04-10 PROCEDURE — 94760 N-INVAS EAR/PLS OXIMETRY 1: CPT

## 2017-04-10 PROCEDURE — 2580000003 HC RX 258: Performed by: INTERNAL MEDICINE

## 2017-04-10 PROCEDURE — 94668 MNPJ CHEST WALL SBSQ: CPT

## 2017-04-10 PROCEDURE — 6370000000 HC RX 637 (ALT 250 FOR IP): Performed by: INTERNAL MEDICINE

## 2017-04-10 RX ORDER — LEVOFLOXACIN 500 MG/1
500 TABLET, FILM COATED ORAL DAILY
Qty: 7 TABLET | Refills: 0 | Status: SHIPPED | OUTPATIENT
Start: 2017-04-10 | End: 2018-05-09

## 2017-04-10 RX ORDER — BENZONATATE 100 MG/1
100 CAPSULE ORAL 3 TIMES DAILY PRN
Qty: 60 CAPSULE | Refills: 0 | Status: SHIPPED | OUTPATIENT
Start: 2017-04-10 | End: 2018-05-09

## 2017-04-10 RX ORDER — PREDNISONE 10 MG/1
TABLET ORAL
Qty: 30 TABLET | Refills: 0 | Status: SHIPPED | OUTPATIENT
Start: 2017-04-10 | End: 2017-05-01 | Stop reason: ALTCHOICE

## 2017-04-10 RX ADMIN — IPRATROPIUM BROMIDE AND ALBUTEROL SULFATE 1 AMPULE: .5; 3 SOLUTION RESPIRATORY (INHALATION) at 11:23

## 2017-04-10 RX ADMIN — IPRATROPIUM BROMIDE AND ALBUTEROL SULFATE 1 AMPULE: .5; 3 SOLUTION RESPIRATORY (INHALATION) at 07:47

## 2017-04-10 RX ADMIN — FAMOTIDINE 20 MG: 20 TABLET ORAL at 08:18

## 2017-04-10 RX ADMIN — METHYLPREDNISOLONE SODIUM SUCCINATE 40 MG: 40 INJECTION, POWDER, LYOPHILIZED, FOR SOLUTION INTRAMUSCULAR; INTRAVENOUS at 05:08

## 2017-04-10 RX ADMIN — Medication 10 ML: at 08:20

## 2017-04-10 RX ADMIN — CALCIUM CARBONATE-VITAMIN D TAB 500 MG-200 UNIT 1 TABLET: 500-200 TAB at 08:18

## 2017-04-10 RX ADMIN — ENOXAPARIN SODIUM 40 MG: 40 INJECTION SUBCUTANEOUS at 08:19

## 2017-04-10 RX ADMIN — SODIUM CHLORIDE: 9 INJECTION, SOLUTION INTRAVENOUS at 02:08

## 2017-04-10 RX ADMIN — LEVOTHYROXINE SODIUM 200 MCG: 100 TABLET ORAL at 06:43

## 2017-04-10 RX ADMIN — FOLIC ACID 1 MG: 1 TABLET ORAL at 08:18

## 2017-04-10 ASSESSMENT — PAIN SCALES - GENERAL
PAINLEVEL_OUTOF10: 2
PAINLEVEL_OUTOF10: 0
PAINLEVEL_OUTOF10: 2
PAINLEVEL_OUTOF10: 2
PAINLEVEL_OUTOF10: 0
PAINLEVEL_OUTOF10: 2

## 2017-04-10 ASSESSMENT — PAIN DESCRIPTION - ORIENTATION: ORIENTATION: ANTERIOR

## 2017-04-10 ASSESSMENT — PAIN DESCRIPTION - PAIN TYPE
TYPE: ACUTE PAIN
TYPE: ACUTE PAIN

## 2017-04-10 ASSESSMENT — PAIN DESCRIPTION - LOCATION
LOCATION: HEAD
LOCATION: HEAD

## 2017-04-12 LAB
CULTURE: ABNORMAL
CULTURE: NORMAL
Lab: ABNORMAL
Lab: ABNORMAL
Lab: NORMAL
SPECIMEN DESCRIPTION: ABNORMAL
SPECIMEN DESCRIPTION: NORMAL
STATUS: ABNORMAL
STATUS: NORMAL

## 2017-05-01 ENCOUNTER — OFFICE VISIT (OUTPATIENT)
Dept: PULMONOLOGY | Age: 67
End: 2017-05-01
Payer: MEDICARE

## 2017-05-01 VITALS
TEMPERATURE: 98.7 F | BODY MASS INDEX: 26.81 KG/M2 | RESPIRATION RATE: 16 BRPM | HEIGHT: 61 IN | SYSTOLIC BLOOD PRESSURE: 113 MMHG | DIASTOLIC BLOOD PRESSURE: 72 MMHG | OXYGEN SATURATION: 96 % | HEART RATE: 92 BPM | WEIGHT: 142 LBS

## 2017-05-01 DIAGNOSIS — M41.9 KYPHOSCOLIOSIS: ICD-10-CM

## 2017-05-01 DIAGNOSIS — J98.4 RESTRICTIVE LUNG DISEASE DUE TO KYPHOSCOLIOSIS: ICD-10-CM

## 2017-05-01 DIAGNOSIS — M41.9 RESTRICTIVE LUNG DISEASE DUE TO KYPHOSCOLIOSIS: ICD-10-CM

## 2017-05-01 DIAGNOSIS — J84.10 PULMONARY FIBROSIS DETERMINED BY HIGH RESOLUTION COMPUTED TOMOGRAPHY (HCC): ICD-10-CM

## 2017-05-01 DIAGNOSIS — K21.9 GASTROESOPHAGEAL REFLUX DISEASE, ESOPHAGITIS PRESENCE NOT SPECIFIED: ICD-10-CM

## 2017-05-01 DIAGNOSIS — J45.30 MILD PERSISTENT ASTHMA WITHOUT COMPLICATION: ICD-10-CM

## 2017-05-01 DIAGNOSIS — R09.82 POST-NASAL DRIP: ICD-10-CM

## 2017-05-01 DIAGNOSIS — J47.9 BRONCHIECTASIS WITHOUT COMPLICATION (HCC): ICD-10-CM

## 2017-05-01 DIAGNOSIS — J84.9 ILD (INTERSTITIAL LUNG DISEASE) (HCC): Primary | ICD-10-CM

## 2017-05-01 DIAGNOSIS — R05.3 CHRONIC COUGH: ICD-10-CM

## 2017-05-01 PROCEDURE — 1090F PRES/ABSN URINE INCON ASSESS: CPT | Performed by: INTERNAL MEDICINE

## 2017-05-01 PROCEDURE — G8399 PT W/DXA RESULTS DOCUMENT: HCPCS | Performed by: INTERNAL MEDICINE

## 2017-05-01 PROCEDURE — 1123F ACP DISCUSS/DSCN MKR DOCD: CPT | Performed by: INTERNAL MEDICINE

## 2017-05-01 PROCEDURE — 3014F SCREEN MAMMO DOC REV: CPT | Performed by: INTERNAL MEDICINE

## 2017-05-01 PROCEDURE — G8420 CALC BMI NORM PARAMETERS: HCPCS | Performed by: INTERNAL MEDICINE

## 2017-05-01 PROCEDURE — 4040F PNEUMOC VAC/ADMIN/RCVD: CPT | Performed by: INTERNAL MEDICINE

## 2017-05-01 PROCEDURE — 1036F TOBACCO NON-USER: CPT | Performed by: INTERNAL MEDICINE

## 2017-05-01 PROCEDURE — 99215 OFFICE O/P EST HI 40 MIN: CPT | Performed by: INTERNAL MEDICINE

## 2017-05-01 PROCEDURE — 3017F COLORECTAL CA SCREEN DOC REV: CPT | Performed by: INTERNAL MEDICINE

## 2017-05-01 PROCEDURE — G8427 DOCREV CUR MEDS BY ELIG CLIN: HCPCS | Performed by: INTERNAL MEDICINE

## 2017-05-01 PROCEDURE — 1111F DSCHRG MED/CURRENT MED MERGE: CPT | Performed by: INTERNAL MEDICINE

## 2017-05-01 RX ORDER — FLUTICASONE FUROATE AND VILANTEROL 200; 25 UG/1; UG/1
1 POWDER RESPIRATORY (INHALATION) DAILY
Qty: 1 EACH | Refills: 11 | Status: SHIPPED | OUTPATIENT
Start: 2017-05-01 | End: 2018-05-09

## 2017-05-01 RX ORDER — FLUTICASONE PROPIONATE 50 MCG
1 SPRAY, SUSPENSION (ML) NASAL DAILY
Qty: 1 BOTTLE | Refills: 3 | Status: SHIPPED | OUTPATIENT
Start: 2017-05-01

## 2017-06-23 PROBLEM — J84.9 CHRONIC INTERSTITIAL LUNG DISEASE (HCC): Status: ACTIVE | Noted: 2017-06-23

## 2017-07-10 LAB
EKG ATRIAL RATE: 96 BPM
EKG P AXIS: 36 DEGREES
EKG P-R INTERVAL: 128 MS
EKG Q-T INTERVAL: 356 MS
EKG QRS DURATION: 80 MS
EKG QTC CALCULATION (BAZETT): 449 MS
EKG R AXIS: 43 DEGREES
EKG T AXIS: 17 DEGREES
EKG VENTRICULAR RATE: 96 BPM

## 2017-08-05 ENCOUNTER — APPOINTMENT (OUTPATIENT)
Dept: CT IMAGING | Age: 67
DRG: 871 | End: 2017-08-05
Payer: MEDICARE

## 2017-08-05 ENCOUNTER — HOSPITAL ENCOUNTER (INPATIENT)
Age: 67
LOS: 6 days | Discharge: NURSING, REHAB OR LONG-TERM CARE | DRG: 871 | End: 2017-08-11
Attending: EMERGENCY MEDICINE | Admitting: INTERNAL MEDICINE
Payer: MEDICARE

## 2017-08-05 ENCOUNTER — APPOINTMENT (OUTPATIENT)
Dept: GENERAL RADIOLOGY | Age: 67
DRG: 871 | End: 2017-08-05
Payer: MEDICARE

## 2017-08-05 DIAGNOSIS — J96.22 ACUTE ON CHRONIC RESPIRATORY FAILURE WITH HYPERCAPNIA (HCC): ICD-10-CM

## 2017-08-05 DIAGNOSIS — J18.9 PNEUMONIA OF RIGHT LOWER LOBE DUE TO INFECTIOUS ORGANISM: Primary | ICD-10-CM

## 2017-08-05 DIAGNOSIS — J96.12 CHRONIC HYPERCAPNIC RESPIRATORY FAILURE (HCC): ICD-10-CM

## 2017-08-05 DIAGNOSIS — R09.02 HYPOXIA: ICD-10-CM

## 2017-08-05 DIAGNOSIS — A41.9 SEPSIS, DUE TO UNSPECIFIED ORGANISM: ICD-10-CM

## 2017-08-05 DIAGNOSIS — K52.9 CHRONIC DIARRHEA: ICD-10-CM

## 2017-08-05 DIAGNOSIS — J84.9 CHRONIC INTERSTITIAL LUNG DISEASE (HCC): Chronic | ICD-10-CM

## 2017-08-05 PROBLEM — M41.9 KYPHOSCOLIOSIS: Status: RESOLVED | Noted: 2017-05-01 | Resolved: 2017-08-05

## 2017-08-05 PROBLEM — M41.80 DEXTROSCOLIOSIS: Chronic | Status: RESOLVED | Noted: 2017-04-10 | Resolved: 2017-08-05

## 2017-08-05 LAB
ABSOLUTE EOS #: 0.1 K/UL (ref 0–0.4)
ABSOLUTE LYMPH #: 1.7 K/UL (ref 1–4.8)
ABSOLUTE MONO #: 1 K/UL (ref 0.2–0.8)
ANION GAP SERPL CALCULATED.3IONS-SCNC: 12 MMOL/L (ref 9–17)
BASOPHILS # BLD: 1 %
BASOPHILS ABSOLUTE: 0 K/UL (ref 0–0.2)
BUN BLDV-MCNC: 8 MG/DL (ref 8–23)
BUN/CREAT BLD: 13 (ref 9–20)
CALCIUM SERPL-MCNC: 8.8 MG/DL (ref 8.6–10.4)
CHLORIDE BLD-SCNC: 102 MMOL/L (ref 98–107)
CO2: 29 MMOL/L (ref 20–31)
CREAT SERPL-MCNC: 0.61 MG/DL (ref 0.5–0.9)
DIFFERENTIAL TYPE: ABNORMAL
EOSINOPHILS RELATIVE PERCENT: 1 %
GFR AFRICAN AMERICAN: >60 ML/MIN
GFR NON-AFRICAN AMERICAN: >60 ML/MIN
GFR SERPL CREATININE-BSD FRML MDRD: ABNORMAL ML/MIN/{1.73_M2}
GFR SERPL CREATININE-BSD FRML MDRD: ABNORMAL ML/MIN/{1.73_M2}
GLUCOSE BLD-MCNC: 85 MG/DL (ref 70–99)
HCT VFR BLD CALC: 40.8 % (ref 36–46)
HEMOGLOBIN: 13.3 G/DL (ref 12–16)
LACTIC ACID, WHOLE BLOOD: NORMAL MMOL/L (ref 0.7–2.1)
LACTIC ACID: 1.1 MMOL/L (ref 0.5–2.2)
LYMPHOCYTES # BLD: 19 %
MCH RBC QN AUTO: 29.8 PG (ref 26–34)
MCHC RBC AUTO-ENTMCNC: 32.6 G/DL (ref 31–37)
MCV RBC AUTO: 91.5 FL (ref 80–100)
MONOCYTES # BLD: 11 %
PDW BLD-RTO: 12.5 % (ref 12.1–15.2)
PLATELET # BLD: 297 K/UL (ref 140–450)
PLATELET ESTIMATE: ABNORMAL
PMV BLD AUTO: 7.6 FL (ref 6–12)
POTASSIUM SERPL-SCNC: 3.6 MMOL/L (ref 3.7–5.3)
RBC # BLD: 4.46 M/UL (ref 4–5.2)
RBC # BLD: ABNORMAL 10*6/UL
SEG NEUTROPHILS: 68 %
SEGMENTED NEUTROPHILS ABSOLUTE COUNT: 6 K/UL (ref 1.8–7.7)
SODIUM BLD-SCNC: 143 MMOL/L (ref 135–144)
WBC # BLD: 8.8 K/UL (ref 3.5–11)
WBC # BLD: ABNORMAL 10*3/UL

## 2017-08-05 PROCEDURE — 96372 THER/PROPH/DIAG INJ SC/IM: CPT

## 2017-08-05 PROCEDURE — 96365 THER/PROPH/DIAG IV INF INIT: CPT

## 2017-08-05 PROCEDURE — 96375 TX/PRO/DX INJ NEW DRUG ADDON: CPT

## 2017-08-05 PROCEDURE — 71020 XR CHEST STANDARD TWO VW: CPT

## 2017-08-05 PROCEDURE — 6370000000 HC RX 637 (ALT 250 FOR IP): Performed by: INTERNAL MEDICINE

## 2017-08-05 PROCEDURE — 6370000000 HC RX 637 (ALT 250 FOR IP): Performed by: EMERGENCY MEDICINE

## 2017-08-05 PROCEDURE — 94761 N-INVAS EAR/PLS OXIMETRY MLT: CPT

## 2017-08-05 PROCEDURE — 94640 AIRWAY INHALATION TREATMENT: CPT

## 2017-08-05 PROCEDURE — 85025 COMPLETE CBC W/AUTO DIFF WBC: CPT

## 2017-08-05 PROCEDURE — 83605 ASSAY OF LACTIC ACID: CPT

## 2017-08-05 PROCEDURE — 36415 COLL VENOUS BLD VENIPUNCTURE: CPT

## 2017-08-05 PROCEDURE — 87324 CLOSTRIDIUM AG IA: CPT

## 2017-08-05 PROCEDURE — 2000000000 HC ICU R&B

## 2017-08-05 PROCEDURE — 99285 EMERGENCY DEPT VISIT HI MDM: CPT

## 2017-08-05 PROCEDURE — 6360000004 HC RX CONTRAST MEDICATION: Performed by: EMERGENCY MEDICINE

## 2017-08-05 PROCEDURE — 87040 BLOOD CULTURE FOR BACTERIA: CPT

## 2017-08-05 PROCEDURE — 74177 CT ABD & PELVIS W/CONTRAST: CPT

## 2017-08-05 PROCEDURE — 94664 DEMO&/EVAL PT USE INHALER: CPT

## 2017-08-05 PROCEDURE — 6360000002 HC RX W HCPCS: Performed by: EMERGENCY MEDICINE

## 2017-08-05 PROCEDURE — 6360000002 HC RX W HCPCS: Performed by: INTERNAL MEDICINE

## 2017-08-05 PROCEDURE — 2580000003 HC RX 258: Performed by: EMERGENCY MEDICINE

## 2017-08-05 PROCEDURE — 80048 BASIC METABOLIC PNL TOTAL CA: CPT

## 2017-08-05 RX ORDER — FLUTICASONE PROPIONATE 50 MCG
1 SPRAY, SUSPENSION (ML) NASAL DAILY
Status: DISCONTINUED | OUTPATIENT
Start: 2017-08-06 | End: 2017-08-11 | Stop reason: HOSPADM

## 2017-08-05 RX ORDER — SODIUM CHLORIDE 9 MG/ML
250 INJECTION, SOLUTION INTRAVENOUS CONTINUOUS
Status: DISCONTINUED | OUTPATIENT
Start: 2017-08-05 | End: 2017-08-05 | Stop reason: SDUPTHER

## 2017-08-05 RX ORDER — ALBUTEROL SULFATE 90 UG/1
2 AEROSOL, METERED RESPIRATORY (INHALATION) 4 TIMES DAILY
Status: DISCONTINUED | OUTPATIENT
Start: 2017-08-05 | End: 2017-08-05 | Stop reason: ALTCHOICE

## 2017-08-05 RX ORDER — SODIUM CHLORIDE 0.9 % (FLUSH) 0.9 %
10 SYRINGE (ML) INJECTION EVERY 12 HOURS SCHEDULED
Status: DISCONTINUED | OUTPATIENT
Start: 2017-08-05 | End: 2017-08-11 | Stop reason: HOSPADM

## 2017-08-05 RX ORDER — ALBUTEROL SULFATE 2.5 MG/3ML
2.5 SOLUTION RESPIRATORY (INHALATION)
Status: DISCONTINUED | OUTPATIENT
Start: 2017-08-05 | End: 2017-08-06

## 2017-08-05 RX ORDER — FAMOTIDINE 20 MG/1
20 TABLET, FILM COATED ORAL 2 TIMES DAILY
Status: DISCONTINUED | OUTPATIENT
Start: 2017-08-05 | End: 2017-08-10

## 2017-08-05 RX ORDER — SODIUM CHLORIDE 0.9 % (FLUSH) 0.9 %
10 SYRINGE (ML) INJECTION PRN
Status: DISCONTINUED | OUTPATIENT
Start: 2017-08-05 | End: 2017-08-11 | Stop reason: HOSPADM

## 2017-08-05 RX ORDER — IPRATROPIUM BROMIDE AND ALBUTEROL SULFATE 2.5; .5 MG/3ML; MG/3ML
1 SOLUTION RESPIRATORY (INHALATION) ONCE
Status: COMPLETED | OUTPATIENT
Start: 2017-08-05 | End: 2017-08-05

## 2017-08-05 RX ORDER — ONDANSETRON 2 MG/ML
4 INJECTION INTRAMUSCULAR; INTRAVENOUS ONCE
Status: COMPLETED | OUTPATIENT
Start: 2017-08-05 | End: 2017-08-05

## 2017-08-05 RX ORDER — FENTANYL CITRATE 50 UG/ML
25 INJECTION, SOLUTION INTRAMUSCULAR; INTRAVENOUS ONCE
Status: COMPLETED | OUTPATIENT
Start: 2017-08-05 | End: 2017-08-05

## 2017-08-05 RX ORDER — ONDANSETRON 2 MG/ML
4 INJECTION INTRAMUSCULAR; INTRAVENOUS EVERY 6 HOURS PRN
Status: DISCONTINUED | OUTPATIENT
Start: 2017-08-05 | End: 2017-08-11 | Stop reason: HOSPADM

## 2017-08-05 RX ORDER — SODIUM CHLORIDE 9 MG/ML
INJECTION, SOLUTION INTRAVENOUS CONTINUOUS
Status: DISCONTINUED | OUTPATIENT
Start: 2017-08-05 | End: 2017-08-08

## 2017-08-05 RX ORDER — RALOXIFENE HYDROCHLORIDE 60 MG/1
60 TABLET, FILM COATED ORAL DAILY
Status: DISCONTINUED | OUTPATIENT
Start: 2017-08-06 | End: 2017-08-06

## 2017-08-05 RX ORDER — DICYCLOMINE HYDROCHLORIDE 10 MG/ML
20 INJECTION INTRAMUSCULAR ONCE
Status: COMPLETED | OUTPATIENT
Start: 2017-08-05 | End: 2017-08-05

## 2017-08-05 RX ORDER — ACETAMINOPHEN 325 MG/1
650 TABLET ORAL EVERY 4 HOURS PRN
Status: DISCONTINUED | OUTPATIENT
Start: 2017-08-05 | End: 2017-08-11 | Stop reason: HOSPADM

## 2017-08-05 RX ORDER — LEVOTHYROXINE SODIUM 0.1 MG/1
200 TABLET ORAL DAILY
Status: DISCONTINUED | OUTPATIENT
Start: 2017-08-06 | End: 2017-08-11 | Stop reason: HOSPADM

## 2017-08-05 RX ORDER — ALBUTEROL SULFATE 2.5 MG/3ML
2.5 SOLUTION RESPIRATORY (INHALATION) EVERY 6 HOURS PRN
Status: DISCONTINUED | OUTPATIENT
Start: 2017-08-05 | End: 2017-08-05 | Stop reason: SDUPTHER

## 2017-08-05 RX ADMIN — ALBUTEROL SULFATE 2.5 MG: 2.5 SOLUTION RESPIRATORY (INHALATION) at 21:40

## 2017-08-05 RX ADMIN — IOPAMIDOL 100 ML: 612 INJECTION, SOLUTION INTRAVENOUS at 17:20

## 2017-08-05 RX ADMIN — IPRATROPIUM BROMIDE AND ALBUTEROL SULFATE 1 AMPULE: .5; 3 SOLUTION RESPIRATORY (INHALATION) at 17:27

## 2017-08-05 RX ADMIN — DICYCLOMINE HYDROCHLORIDE 20 MG: 20 INJECTION, SOLUTION INTRAMUSCULAR at 16:27

## 2017-08-05 RX ADMIN — FAMOTIDINE 20 MG: 20 TABLET ORAL at 21:09

## 2017-08-05 RX ADMIN — CEFTRIAXONE 1 G: 1 INJECTION, POWDER, FOR SOLUTION INTRAMUSCULAR; INTRAVENOUS at 18:19

## 2017-08-05 RX ADMIN — FENTANYL CITRATE 25 MCG: 50 INJECTION INTRAMUSCULAR; INTRAVENOUS at 16:27

## 2017-08-05 RX ADMIN — AZITHROMYCIN MONOHYDRATE 500 MG: 500 INJECTION, POWDER, LYOPHILIZED, FOR SOLUTION INTRAVENOUS at 18:57

## 2017-08-05 RX ADMIN — ONDANSETRON 4 MG: 2 INJECTION INTRAMUSCULAR; INTRAVENOUS at 16:27

## 2017-08-05 RX ADMIN — SODIUM CHLORIDE 250 ML/HR: 9 INJECTION, SOLUTION INTRAVENOUS at 16:22

## 2017-08-05 RX ADMIN — ENOXAPARIN SODIUM 40 MG: 40 INJECTION SUBCUTANEOUS at 21:09

## 2017-08-05 ASSESSMENT — ENCOUNTER SYMPTOMS
NAUSEA: 1
DIARRHEA: 1
BACK PAIN: 0
SHORTNESS OF BREATH: 1
WHEEZING: 0
ABDOMINAL PAIN: 1
COUGH: 0
COLOR CHANGE: 0
CONSTIPATION: 0
ABDOMINAL DISTENTION: 0

## 2017-08-05 ASSESSMENT — PAIN DESCRIPTION - LOCATION: LOCATION: ABDOMEN

## 2017-08-05 ASSESSMENT — PAIN SCALES - GENERAL
PAINLEVEL_OUTOF10: 6
PAINLEVEL_OUTOF10: 6
PAINLEVEL_OUTOF10: 0

## 2017-08-05 ASSESSMENT — PAIN DESCRIPTION - DESCRIPTORS: DESCRIPTORS: CRAMPING

## 2017-08-05 ASSESSMENT — PAIN DESCRIPTION - PAIN TYPE: TYPE: ACUTE PAIN

## 2017-08-06 LAB
ABSOLUTE EOS #: 0.1 K/UL (ref 0–0.4)
ABSOLUTE LYMPH #: 1.3 K/UL (ref 1–4.8)
ABSOLUTE MONO #: 1 K/UL (ref 0–1)
ANION GAP SERPL CALCULATED.3IONS-SCNC: 9 MMOL/L (ref 9–17)
BASOPHILS # BLD: 0 %
BASOPHILS ABSOLUTE: 0 K/UL (ref 0–0.2)
BUN BLDV-MCNC: 7 MG/DL (ref 8–23)
BUN/CREAT BLD: 10 (ref 9–20)
CALCIUM SERPL-MCNC: 7.8 MG/DL (ref 8.6–10.4)
CHLORIDE BLD-SCNC: 102 MMOL/L (ref 98–107)
CO2: 29 MMOL/L (ref 20–31)
CREAT SERPL-MCNC: 0.67 MG/DL (ref 0.5–0.9)
DIFFERENTIAL TYPE: ABNORMAL
EOSINOPHILS RELATIVE PERCENT: 2 %
GFR AFRICAN AMERICAN: >60 ML/MIN
GFR NON-AFRICAN AMERICAN: >60 ML/MIN
GFR SERPL CREATININE-BSD FRML MDRD: ABNORMAL ML/MIN/{1.73_M2}
GFR SERPL CREATININE-BSD FRML MDRD: ABNORMAL ML/MIN/{1.73_M2}
GLUCOSE BLD-MCNC: 102 MG/DL (ref 70–99)
HCT VFR BLD CALC: 34.1 % (ref 36–46)
HEMOGLOBIN: 11.3 G/DL (ref 12–16)
LYMPHOCYTES # BLD: 14 %
MCH RBC QN AUTO: 31 PG (ref 26–34)
MCHC RBC AUTO-ENTMCNC: 33.1 G/DL (ref 31–37)
MCV RBC AUTO: 93.8 FL (ref 80–100)
MONOCYTES # BLD: 10 %
PDW BLD-RTO: 13.7 % (ref 12.1–15.2)
PLATELET # BLD: 225 K/UL (ref 140–450)
PLATELET ESTIMATE: ABNORMAL
PMV BLD AUTO: 7.8 FL (ref 6–12)
POTASSIUM SERPL-SCNC: 3.6 MMOL/L (ref 3.7–5.3)
RBC # BLD: 3.64 M/UL (ref 4–5.2)
RBC # BLD: ABNORMAL 10*6/UL
SEG NEUTROPHILS: 74 %
SEGMENTED NEUTROPHILS ABSOLUTE COUNT: 7 K/UL (ref 1.8–7.7)
SODIUM BLD-SCNC: 140 MMOL/L (ref 135–144)
WBC # BLD: 9.4 K/UL (ref 3.5–11)
WBC # BLD: ABNORMAL 10*3/UL

## 2017-08-06 PROCEDURE — G8997 SWALLOW GOAL STATUS: HCPCS

## 2017-08-06 PROCEDURE — 97116 GAIT TRAINING THERAPY: CPT

## 2017-08-06 PROCEDURE — G8979 MOBILITY GOAL STATUS: HCPCS

## 2017-08-06 PROCEDURE — 97166 OT EVAL MOD COMPLEX 45 MIN: CPT

## 2017-08-06 PROCEDURE — 6360000002 HC RX W HCPCS: Performed by: INTERNAL MEDICINE

## 2017-08-06 PROCEDURE — 6370000000 HC RX 637 (ALT 250 FOR IP): Performed by: INTERNAL MEDICINE

## 2017-08-06 PROCEDURE — G8978 MOBILITY CURRENT STATUS: HCPCS

## 2017-08-06 PROCEDURE — G8996 SWALLOW CURRENT STATUS: HCPCS

## 2017-08-06 PROCEDURE — G8988 SELF CARE GOAL STATUS: HCPCS

## 2017-08-06 PROCEDURE — 80048 BASIC METABOLIC PNL TOTAL CA: CPT

## 2017-08-06 PROCEDURE — 36415 COLL VENOUS BLD VENIPUNCTURE: CPT

## 2017-08-06 PROCEDURE — G8987 SELF CARE CURRENT STATUS: HCPCS

## 2017-08-06 PROCEDURE — 97161 PT EVAL LOW COMPLEX 20 MIN: CPT

## 2017-08-06 PROCEDURE — 94761 N-INVAS EAR/PLS OXIMETRY MLT: CPT

## 2017-08-06 PROCEDURE — 85025 COMPLETE CBC W/AUTO DIFF WBC: CPT

## 2017-08-06 PROCEDURE — 94667 MNPJ CHEST WALL 1ST: CPT

## 2017-08-06 PROCEDURE — G8998 SWALLOW D/C STATUS: HCPCS

## 2017-08-06 PROCEDURE — 2580000003 HC RX 258: Performed by: INTERNAL MEDICINE

## 2017-08-06 PROCEDURE — 2000000000 HC ICU R&B

## 2017-08-06 PROCEDURE — 92610 EVALUATE SWALLOWING FUNCTION: CPT

## 2017-08-06 PROCEDURE — 97530 THERAPEUTIC ACTIVITIES: CPT

## 2017-08-06 PROCEDURE — 94664 DEMO&/EVAL PT USE INHALER: CPT

## 2017-08-06 PROCEDURE — 94640 AIRWAY INHALATION TREATMENT: CPT

## 2017-08-06 RX ORDER — ALBUTEROL SULFATE 2.5 MG/3ML
2.5 SOLUTION RESPIRATORY (INHALATION) EVERY 4 HOURS PRN
Status: DISCONTINUED | OUTPATIENT
Start: 2017-08-06 | End: 2017-08-11 | Stop reason: HOSPADM

## 2017-08-06 RX ORDER — BENZONATATE 100 MG/1
100 CAPSULE ORAL 3 TIMES DAILY PRN
Status: DISCONTINUED | OUTPATIENT
Start: 2017-08-06 | End: 2017-08-11 | Stop reason: HOSPADM

## 2017-08-06 RX ORDER — ALBUTEROL SULFATE 2.5 MG/3ML
2.5 SOLUTION RESPIRATORY (INHALATION) 4 TIMES DAILY
Status: DISCONTINUED | OUTPATIENT
Start: 2017-08-06 | End: 2017-08-11 | Stop reason: HOSPADM

## 2017-08-06 RX ORDER — LACTOBACILLUS RHAMNOSUS GG 10B CELL
1 CAPSULE ORAL
Status: DISCONTINUED | OUTPATIENT
Start: 2017-08-06 | End: 2017-08-11 | Stop reason: HOSPADM

## 2017-08-06 RX ADMIN — AZITHROMYCIN MONOHYDRATE 500 MG: 500 INJECTION, POWDER, LYOPHILIZED, FOR SOLUTION INTRAVENOUS at 20:33

## 2017-08-06 RX ADMIN — ALBUTEROL SULFATE 2.5 MG: 2.5 SOLUTION RESPIRATORY (INHALATION) at 02:12

## 2017-08-06 RX ADMIN — ALBUTEROL SULFATE 2.5 MG: 2.5 SOLUTION RESPIRATORY (INHALATION) at 06:12

## 2017-08-06 RX ADMIN — Medication 1 CAPSULE: at 12:57

## 2017-08-06 RX ADMIN — ALBUTEROL SULFATE 2.5 MG: 2.5 SOLUTION RESPIRATORY (INHALATION) at 16:23

## 2017-08-06 RX ADMIN — CEFTRIAXONE 1 G: 1 INJECTION, POWDER, FOR SOLUTION INTRAMUSCULAR; INTRAVENOUS at 20:04

## 2017-08-06 RX ADMIN — METHOTREXATE SODIUM 15 MG: 2.5 TABLET ORAL at 08:20

## 2017-08-06 RX ADMIN — SALINE NASAL SPRAY 2 SPRAY: 1.5 SOLUTION NASAL at 20:33

## 2017-08-06 RX ADMIN — ENOXAPARIN SODIUM 40 MG: 40 INJECTION SUBCUTANEOUS at 08:20

## 2017-08-06 RX ADMIN — FAMOTIDINE 20 MG: 20 TABLET ORAL at 08:20

## 2017-08-06 RX ADMIN — SODIUM CHLORIDE: 9 INJECTION, SOLUTION INTRAVENOUS at 18:28

## 2017-08-06 RX ADMIN — FAMOTIDINE 20 MG: 20 TABLET ORAL at 20:33

## 2017-08-06 RX ADMIN — ACETAMINOPHEN 650 MG: 325 TABLET, FILM COATED ORAL at 12:56

## 2017-08-06 RX ADMIN — BENZONATATE 100 MG: 100 CAPSULE ORAL at 20:33

## 2017-08-06 RX ADMIN — ONDANSETRON 4 MG: 2 INJECTION INTRAMUSCULAR; INTRAVENOUS at 21:11

## 2017-08-06 RX ADMIN — ALBUTEROL SULFATE 2.5 MG: 2.5 SOLUTION RESPIRATORY (INHALATION) at 20:59

## 2017-08-06 RX ADMIN — FLUTICASONE PROPIONATE 1 SPRAY: 50 SPRAY, METERED NASAL at 08:20

## 2017-08-06 RX ADMIN — LEVOTHYROXINE SODIUM 200 MCG: 100 TABLET ORAL at 07:38

## 2017-08-06 RX ADMIN — ALBUTEROL SULFATE 2.5 MG: 2.5 SOLUTION RESPIRATORY (INHALATION) at 10:54

## 2017-08-06 ASSESSMENT — PAIN DESCRIPTION - PAIN TYPE: TYPE: ACUTE PAIN

## 2017-08-06 ASSESSMENT — PAIN SCALES - GENERAL
PAINLEVEL_OUTOF10: 0
PAINLEVEL_OUTOF10: 6

## 2017-08-06 ASSESSMENT — PAIN DESCRIPTION - LOCATION: LOCATION: HEAD

## 2017-08-06 ASSESSMENT — PAIN DESCRIPTION - DESCRIPTORS: DESCRIPTORS: HEADACHE

## 2017-08-07 PROBLEM — E44.0 MODERATE MALNUTRITION (HCC): Status: ACTIVE | Noted: 2017-08-07

## 2017-08-07 PROBLEM — A41.9 SEPSIS (HCC): Status: ACTIVE | Noted: 2017-08-07

## 2017-08-07 LAB
ABSOLUTE EOS #: 0.11 K/UL (ref 0–0.4)
ABSOLUTE LYMPH #: 0.78 K/UL (ref 1–4.8)
ABSOLUTE MONO #: 0.33 K/UL (ref 0–1)
ALLEN TEST: ABNORMAL
ANION GAP SERPL CALCULATED.3IONS-SCNC: 6 MMOL/L (ref 9–17)
BASOPHILS # BLD: 1 %
BASOPHILS ABSOLUTE: 0.11 K/UL (ref 0–0.2)
BUN BLDV-MCNC: 7 MG/DL (ref 8–23)
BUN/CREAT BLD: 12 (ref 9–20)
CALCIUM SERPL-MCNC: 7.9 MG/DL (ref 8.6–10.4)
CARBOXYHEMOGLOBIN: ABNORMAL % (ref 0–5)
CHLORIDE BLD-SCNC: 103 MMOL/L (ref 98–107)
CO2: 31 MMOL/L (ref 20–31)
CREAT SERPL-MCNC: 0.6 MG/DL (ref 0.5–0.9)
DIFFERENTIAL TYPE: ABNORMAL
EOSINOPHILS RELATIVE PERCENT: 1 %
FIO2: ABNORMAL
GFR AFRICAN AMERICAN: >60 ML/MIN
GFR NON-AFRICAN AMERICAN: >60 ML/MIN
GFR SERPL CREATININE-BSD FRML MDRD: ABNORMAL ML/MIN/{1.73_M2}
GFR SERPL CREATININE-BSD FRML MDRD: ABNORMAL ML/MIN/{1.73_M2}
GLUCOSE BLD-MCNC: 132 MG/DL (ref 70–99)
HCO3 ARTERIAL: 32 MMOL/L (ref 22–26)
HCT VFR BLD CALC: 34.4 % (ref 36–46)
HEMOGLOBIN: 11.3 G/DL (ref 12–16)
LYMPHOCYTES # BLD: 7 %
MCH RBC QN AUTO: 30.8 PG (ref 26–34)
MCHC RBC AUTO-ENTMCNC: 32.8 G/DL (ref 31–37)
MCV RBC AUTO: 94 FL (ref 80–100)
METHEMOGLOBIN: ABNORMAL % (ref 0–1.9)
MODE: ABNORMAL
MONOCYTES # BLD: 3 %
MORPHOLOGY: NORMAL
NEGATIVE BASE EXCESS, ART: ABNORMAL MMOL/L (ref 0–2)
NOTIFICATION TIME: ABNORMAL
NOTIFICATION: ABNORMAL
O2 DEVICE/FLOW/%: ABNORMAL
O2 SAT, ARTERIAL: 94.5 % (ref 95–98)
OXYHEMOGLOBIN: ABNORMAL % (ref 95–98)
PATIENT TEMP: 37
PCO2 ARTERIAL: 76.3 MMHG (ref 35–45)
PCO2, ART, TEMP ADJ: ABNORMAL
PDW BLD-RTO: 13.8 % (ref 12.1–15.2)
PEEP/CPAP: ABNORMAL
PH ARTERIAL: 7.24 (ref 7.35–7.45)
PH, ART, TEMP ADJ: ABNORMAL (ref 7.35–7.45)
PLATELET # BLD: 192 K/UL (ref 140–450)
PLATELET ESTIMATE: ABNORMAL
PMV BLD AUTO: 7.6 FL (ref 6–12)
PO2 ARTERIAL: 86.4 MMHG (ref 80–100)
PO2, ART, TEMP ADJ: ABNORMAL MMHG (ref 80–100)
POSITIVE BASE EXCESS, ART: 2.1 MMOL/L (ref 0–2)
POTASSIUM SERPL-SCNC: 4.6 MMOL/L (ref 3.7–5.3)
PSV: ABNORMAL
PT. POSITION: ABNORMAL
RBC # BLD: 3.66 M/UL (ref 4–5.2)
RBC # BLD: ABNORMAL 10*6/UL
RESPIRATORY RATE: ABNORMAL
SAMPLE SITE: ABNORMAL
SEG NEUTROPHILS: 88 %
SEGMENTED NEUTROPHILS ABSOLUTE COUNT: 9.77 K/UL (ref 1.8–7.7)
SET RATE: ABNORMAL
SODIUM BLD-SCNC: 140 MMOL/L (ref 135–144)
TEXT FOR RESPIRATORY: ABNORMAL
TOTAL HB: ABNORMAL G/DL (ref 12–16)
TOTAL RATE: ABNORMAL
VT: ABNORMAL
WBC # BLD: 11.1 K/UL (ref 3.5–11)
WBC # BLD: ABNORMAL 10*3/UL

## 2017-08-07 PROCEDURE — 97116 GAIT TRAINING THERAPY: CPT

## 2017-08-07 PROCEDURE — 80048 BASIC METABOLIC PNL TOTAL CA: CPT

## 2017-08-07 PROCEDURE — 82805 BLOOD GASES W/O2 SATURATION: CPT

## 2017-08-07 PROCEDURE — 85025 COMPLETE CBC W/AUTO DIFF WBC: CPT

## 2017-08-07 PROCEDURE — 94660 CPAP INITIATION&MGMT: CPT

## 2017-08-07 PROCEDURE — 94668 MNPJ CHEST WALL SBSQ: CPT

## 2017-08-07 PROCEDURE — 36415 COLL VENOUS BLD VENIPUNCTURE: CPT

## 2017-08-07 PROCEDURE — 2000000000 HC ICU R&B

## 2017-08-07 PROCEDURE — 36600 WITHDRAWAL OF ARTERIAL BLOOD: CPT

## 2017-08-07 PROCEDURE — 94761 N-INVAS EAR/PLS OXIMETRY MLT: CPT

## 2017-08-07 PROCEDURE — 6360000002 HC RX W HCPCS: Performed by: INTERNAL MEDICINE

## 2017-08-07 PROCEDURE — 6370000000 HC RX 637 (ALT 250 FOR IP): Performed by: INTERNAL MEDICINE

## 2017-08-07 PROCEDURE — 94664 DEMO&/EVAL PT USE INHALER: CPT

## 2017-08-07 PROCEDURE — 97110 THERAPEUTIC EXERCISES: CPT

## 2017-08-07 PROCEDURE — 99223 1ST HOSP IP/OBS HIGH 75: CPT | Performed by: INTERNAL MEDICINE

## 2017-08-07 PROCEDURE — 97535 SELF CARE MNGMENT TRAINING: CPT

## 2017-08-07 PROCEDURE — 2580000003 HC RX 258: Performed by: INTERNAL MEDICINE

## 2017-08-07 PROCEDURE — 6360000002 HC RX W HCPCS: Performed by: PHYSICIAN ASSISTANT

## 2017-08-07 PROCEDURE — 94640 AIRWAY INHALATION TREATMENT: CPT

## 2017-08-07 RX ORDER — METHYLPREDNISOLONE SODIUM SUCCINATE 125 MG/2ML
125 INJECTION, POWDER, LYOPHILIZED, FOR SOLUTION INTRAMUSCULAR; INTRAVENOUS DAILY
Status: DISCONTINUED | OUTPATIENT
Start: 2017-08-07 | End: 2017-08-10

## 2017-08-07 RX ADMIN — ALBUTEROL SULFATE 2.5 MG: 2.5 SOLUTION RESPIRATORY (INHALATION) at 05:43

## 2017-08-07 RX ADMIN — METHYLPREDNISOLONE SODIUM SUCCINATE 125 MG: 125 INJECTION, POWDER, FOR SOLUTION INTRAMUSCULAR; INTRAVENOUS at 08:41

## 2017-08-07 RX ADMIN — ALBUTEROL SULFATE 2.5 MG: 2.5 SOLUTION RESPIRATORY (INHALATION) at 11:39

## 2017-08-07 RX ADMIN — SALINE NASAL SPRAY 2 SPRAY: 1.5 SOLUTION NASAL at 09:09

## 2017-08-07 RX ADMIN — FLUTICASONE PROPIONATE 1 SPRAY: 50 SPRAY, METERED NASAL at 09:09

## 2017-08-07 RX ADMIN — ONDANSETRON 4 MG: 2 INJECTION INTRAMUSCULAR; INTRAVENOUS at 20:56

## 2017-08-07 RX ADMIN — Medication 1 CAPSULE: at 08:41

## 2017-08-07 RX ADMIN — SODIUM CHLORIDE: 9 INJECTION, SOLUTION INTRAVENOUS at 23:59

## 2017-08-07 RX ADMIN — ALBUTEROL SULFATE 2.5 MG: 2.5 SOLUTION RESPIRATORY (INHALATION) at 20:07

## 2017-08-07 RX ADMIN — ACETAMINOPHEN 650 MG: 325 TABLET, FILM COATED ORAL at 13:04

## 2017-08-07 RX ADMIN — SODIUM CHLORIDE: 9 INJECTION, SOLUTION INTRAVENOUS at 09:09

## 2017-08-07 RX ADMIN — BENZONATATE 100 MG: 100 CAPSULE ORAL at 13:04

## 2017-08-07 RX ADMIN — FAMOTIDINE 20 MG: 20 TABLET ORAL at 19:45

## 2017-08-07 RX ADMIN — LEVOTHYROXINE SODIUM 200 MCG: 100 TABLET ORAL at 06:58

## 2017-08-07 RX ADMIN — ENOXAPARIN SODIUM 40 MG: 40 INJECTION SUBCUTANEOUS at 08:41

## 2017-08-07 RX ADMIN — AZITHROMYCIN MONOHYDRATE 500 MG: 500 INJECTION, POWDER, LYOPHILIZED, FOR SOLUTION INTRAVENOUS at 20:30

## 2017-08-07 RX ADMIN — FAMOTIDINE 20 MG: 20 TABLET ORAL at 08:41

## 2017-08-07 RX ADMIN — ACETAMINOPHEN 650 MG: 325 TABLET, FILM COATED ORAL at 18:09

## 2017-08-07 RX ADMIN — CEFTRIAXONE 1 G: 1 INJECTION, POWDER, FOR SOLUTION INTRAMUSCULAR; INTRAVENOUS at 19:45

## 2017-08-07 RX ADMIN — ALBUTEROL SULFATE 2.5 MG: 2.5 SOLUTION RESPIRATORY (INHALATION) at 15:51

## 2017-08-07 ASSESSMENT — PAIN DESCRIPTION - DESCRIPTORS
DESCRIPTORS: ACHING
DESCRIPTORS: ACHING
DESCRIPTORS: HEADACHE

## 2017-08-07 ASSESSMENT — PAIN SCALES - GENERAL
PAINLEVEL_OUTOF10: 0
PAINLEVEL_OUTOF10: 4
PAINLEVEL_OUTOF10: 0
PAINLEVEL_OUTOF10: 0
PAINLEVEL_OUTOF10: 4
PAINLEVEL_OUTOF10: 5
PAINLEVEL_OUTOF10: 0
PAINLEVEL_OUTOF10: 2

## 2017-08-07 ASSESSMENT — PAIN DESCRIPTION - PAIN TYPE
TYPE: ACUTE PAIN

## 2017-08-07 ASSESSMENT — PAIN DESCRIPTION - PROGRESSION: CLINICAL_PROGRESSION: GRADUALLY IMPROVING

## 2017-08-07 ASSESSMENT — PAIN DESCRIPTION - LOCATION
LOCATION: HEAD
LOCATION: BACK
LOCATION: BACK;HEAD

## 2017-08-07 ASSESSMENT — PAIN DESCRIPTION - FREQUENCY
FREQUENCY: INTERMITTENT

## 2017-08-07 ASSESSMENT — PAIN DESCRIPTION - ORIENTATION: ORIENTATION: OTHER (COMMENT)

## 2017-08-08 LAB
ABSOLUTE EOS #: 0 K/UL (ref 0–0.4)
ABSOLUTE LYMPH #: 1.1 K/UL (ref 1–4.8)
ABSOLUTE MONO #: 0.6 K/UL (ref 0–1)
ALLEN TEST: ABNORMAL
ANION GAP SERPL CALCULATED.3IONS-SCNC: 5 MMOL/L (ref 9–17)
BASOPHILS # BLD: 0 %
BASOPHILS ABSOLUTE: 0 K/UL (ref 0–0.2)
BUN BLDV-MCNC: 7 MG/DL (ref 8–23)
BUN/CREAT BLD: 14 (ref 9–20)
CALCIUM SERPL-MCNC: 8.7 MG/DL (ref 8.6–10.4)
CARBOXYHEMOGLOBIN: ABNORMAL % (ref 0–5)
CHLORIDE BLD-SCNC: 101 MMOL/L (ref 98–107)
CO2: 32 MMOL/L (ref 20–31)
CREAT SERPL-MCNC: 0.49 MG/DL (ref 0.5–0.9)
DIFFERENTIAL TYPE: ABNORMAL
EOSINOPHILS RELATIVE PERCENT: 0 %
FIO2: ABNORMAL
GFR AFRICAN AMERICAN: >60 ML/MIN
GFR NON-AFRICAN AMERICAN: >60 ML/MIN
GFR SERPL CREATININE-BSD FRML MDRD: ABNORMAL ML/MIN/{1.73_M2}
GFR SERPL CREATININE-BSD FRML MDRD: ABNORMAL ML/MIN/{1.73_M2}
GLUCOSE BLD-MCNC: 120 MG/DL (ref 70–99)
HCO3 ARTERIAL: 36.1 MMOL/L (ref 22–26)
HCT VFR BLD CALC: 33.2 % (ref 36–46)
HEMOGLOBIN: 11.2 G/DL (ref 12–16)
LYMPHOCYTES # BLD: 13 %
MCH RBC QN AUTO: 31.6 PG (ref 26–34)
MCHC RBC AUTO-ENTMCNC: 33.6 G/DL (ref 31–37)
MCV RBC AUTO: 94.2 FL (ref 80–100)
METHEMOGLOBIN: ABNORMAL % (ref 0–1.9)
MODE: ABNORMAL
MONOCYTES # BLD: 7 %
NEGATIVE BASE EXCESS, ART: ABNORMAL MMOL/L (ref 0–2)
NOTIFICATION TIME: ABNORMAL
NOTIFICATION: ABNORMAL
O2 DEVICE/FLOW/%: ABNORMAL
O2 SAT, ARTERIAL: 91.3 % (ref 95–98)
OXYHEMOGLOBIN: ABNORMAL % (ref 95–98)
PATIENT TEMP: 37
PCO2 ARTERIAL: 80.6 MMHG (ref 35–45)
PCO2, ART, TEMP ADJ: ABNORMAL
PDW BLD-RTO: 13.9 % (ref 12.1–15.2)
PEEP/CPAP: ABNORMAL
PH ARTERIAL: 7.27 (ref 7.35–7.45)
PH, ART, TEMP ADJ: ABNORMAL (ref 7.35–7.45)
PLATELET # BLD: 186 K/UL (ref 140–450)
PLATELET ESTIMATE: ABNORMAL
PMV BLD AUTO: 7.8 FL (ref 6–12)
PO2 ARTERIAL: 71.3 MMHG (ref 80–100)
PO2, ART, TEMP ADJ: ABNORMAL MMHG (ref 80–100)
POSITIVE BASE EXCESS, ART: 5.9 MMOL/L (ref 0–2)
POTASSIUM SERPL-SCNC: 4.7 MMOL/L (ref 3.7–5.3)
PSV: ABNORMAL
PT. POSITION: ABNORMAL
RBC # BLD: 3.53 M/UL (ref 4–5.2)
RBC # BLD: ABNORMAL 10*6/UL
RESPIRATORY RATE: ABNORMAL
SAMPLE SITE: ABNORMAL
SEG NEUTROPHILS: 80 %
SEGMENTED NEUTROPHILS ABSOLUTE COUNT: 6.6 K/UL (ref 1.8–7.7)
SET RATE: ABNORMAL
SODIUM BLD-SCNC: 138 MMOL/L (ref 135–144)
TEXT FOR RESPIRATORY: ABNORMAL
TOTAL HB: ABNORMAL G/DL (ref 12–16)
TOTAL RATE: ABNORMAL
VT: ABNORMAL
WBC # BLD: 8.3 K/UL (ref 3.5–11)
WBC # BLD: ABNORMAL 10*3/UL

## 2017-08-08 PROCEDURE — 6360000002 HC RX W HCPCS: Performed by: INTERNAL MEDICINE

## 2017-08-08 PROCEDURE — 80048 BASIC METABOLIC PNL TOTAL CA: CPT

## 2017-08-08 PROCEDURE — 97110 THERAPEUTIC EXERCISES: CPT

## 2017-08-08 PROCEDURE — 85025 COMPLETE CBC W/AUTO DIFF WBC: CPT

## 2017-08-08 PROCEDURE — 6370000000 HC RX 637 (ALT 250 FOR IP): Performed by: INTERNAL MEDICINE

## 2017-08-08 PROCEDURE — 82805 BLOOD GASES W/O2 SATURATION: CPT

## 2017-08-08 PROCEDURE — 36415 COLL VENOUS BLD VENIPUNCTURE: CPT

## 2017-08-08 PROCEDURE — 94660 CPAP INITIATION&MGMT: CPT

## 2017-08-08 PROCEDURE — 94668 MNPJ CHEST WALL SBSQ: CPT

## 2017-08-08 PROCEDURE — 36600 WITHDRAWAL OF ARTERIAL BLOOD: CPT

## 2017-08-08 PROCEDURE — 2580000003 HC RX 258: Performed by: INTERNAL MEDICINE

## 2017-08-08 PROCEDURE — 2000000000 HC ICU R&B

## 2017-08-08 PROCEDURE — 94761 N-INVAS EAR/PLS OXIMETRY MLT: CPT

## 2017-08-08 PROCEDURE — 97116 GAIT TRAINING THERAPY: CPT

## 2017-08-08 PROCEDURE — 6360000002 HC RX W HCPCS: Performed by: PHYSICIAN ASSISTANT

## 2017-08-08 PROCEDURE — 94640 AIRWAY INHALATION TREATMENT: CPT

## 2017-08-08 RX ORDER — FUROSEMIDE 10 MG/ML
40 INJECTION INTRAMUSCULAR; INTRAVENOUS ONCE
Status: COMPLETED | OUTPATIENT
Start: 2017-08-08 | End: 2017-08-08

## 2017-08-08 RX ADMIN — FLUTICASONE PROPIONATE 1 SPRAY: 50 SPRAY, METERED NASAL at 08:26

## 2017-08-08 RX ADMIN — ONDANSETRON 4 MG: 2 INJECTION INTRAMUSCULAR; INTRAVENOUS at 13:16

## 2017-08-08 RX ADMIN — FUROSEMIDE 40 MG: 10 INJECTION, SOLUTION INTRAMUSCULAR; INTRAVENOUS at 07:02

## 2017-08-08 RX ADMIN — AZITHROMYCIN MONOHYDRATE 500 MG: 500 INJECTION, POWDER, LYOPHILIZED, FOR SOLUTION INTRAVENOUS at 20:01

## 2017-08-08 RX ADMIN — ALBUTEROL SULFATE 2.5 MG: 2.5 SOLUTION RESPIRATORY (INHALATION) at 20:30

## 2017-08-08 RX ADMIN — ALBUTEROL SULFATE 2.5 MG: 2.5 SOLUTION RESPIRATORY (INHALATION) at 10:29

## 2017-08-08 RX ADMIN — ALBUTEROL SULFATE 2.5 MG: 2.5 SOLUTION RESPIRATORY (INHALATION) at 06:02

## 2017-08-08 RX ADMIN — FAMOTIDINE 20 MG: 20 TABLET ORAL at 20:49

## 2017-08-08 RX ADMIN — LEVOTHYROXINE SODIUM 200 MCG: 100 TABLET ORAL at 07:05

## 2017-08-08 RX ADMIN — ONDANSETRON 4 MG: 2 INJECTION INTRAMUSCULAR; INTRAVENOUS at 04:22

## 2017-08-08 RX ADMIN — METHYLPREDNISOLONE SODIUM SUCCINATE 125 MG: 125 INJECTION, POWDER, FOR SOLUTION INTRAMUSCULAR; INTRAVENOUS at 08:40

## 2017-08-08 RX ADMIN — Medication 10 ML: at 21:11

## 2017-08-08 RX ADMIN — FAMOTIDINE 20 MG: 20 TABLET ORAL at 08:23

## 2017-08-08 RX ADMIN — Medication 1 CAPSULE: at 08:23

## 2017-08-08 RX ADMIN — ENOXAPARIN SODIUM 40 MG: 40 INJECTION SUBCUTANEOUS at 08:23

## 2017-08-08 RX ADMIN — ACETAMINOPHEN 650 MG: 325 TABLET, FILM COATED ORAL at 04:12

## 2017-08-08 RX ADMIN — BENZONATATE 100 MG: 100 CAPSULE ORAL at 04:12

## 2017-08-08 RX ADMIN — CEFTRIAXONE 1 G: 1 INJECTION, POWDER, FOR SOLUTION INTRAMUSCULAR; INTRAVENOUS at 19:20

## 2017-08-08 RX ADMIN — Medication 10 ML: at 08:40

## 2017-08-08 RX ADMIN — ONDANSETRON 4 MG: 2 INJECTION INTRAMUSCULAR; INTRAVENOUS at 21:10

## 2017-08-08 RX ADMIN — ALBUTEROL SULFATE 2.5 MG: 2.5 SOLUTION RESPIRATORY (INHALATION) at 17:44

## 2017-08-08 ASSESSMENT — PAIN SCALES - GENERAL
PAINLEVEL_OUTOF10: 0
PAINLEVEL_OUTOF10: 3
PAINLEVEL_OUTOF10: 0

## 2017-08-08 ASSESSMENT — PAIN DESCRIPTION - DESCRIPTORS: DESCRIPTORS: ACHING

## 2017-08-08 ASSESSMENT — PAIN DESCRIPTION - FREQUENCY: FREQUENCY: INTERMITTENT

## 2017-08-08 ASSESSMENT — PAIN DESCRIPTION - PAIN TYPE: TYPE: ACUTE PAIN

## 2017-08-08 ASSESSMENT — PAIN DESCRIPTION - LOCATION: LOCATION: BACK

## 2017-08-09 LAB
ALLEN TEST: ABNORMAL
ANION GAP SERPL CALCULATED.3IONS-SCNC: 4 MMOL/L (ref 9–17)
BUN BLDV-MCNC: 10 MG/DL (ref 8–23)
BUN/CREAT BLD: 20 (ref 9–20)
CALCIUM SERPL-MCNC: 8.7 MG/DL (ref 8.6–10.4)
CARBOXYHEMOGLOBIN: ABNORMAL % (ref 0–5)
CHLORIDE BLD-SCNC: 96 MMOL/L (ref 98–107)
CO2: 37 MMOL/L (ref 20–31)
CREAT SERPL-MCNC: 0.51 MG/DL (ref 0.5–0.9)
FIO2: ABNORMAL
GFR AFRICAN AMERICAN: >60 ML/MIN
GFR NON-AFRICAN AMERICAN: >60 ML/MIN
GFR SERPL CREATININE-BSD FRML MDRD: ABNORMAL ML/MIN/{1.73_M2}
GFR SERPL CREATININE-BSD FRML MDRD: ABNORMAL ML/MIN/{1.73_M2}
GLUCOSE BLD-MCNC: 110 MG/DL (ref 70–99)
HCO3 ARTERIAL: 40 MMOL/L (ref 22–26)
HCT VFR BLD CALC: 31.3 % (ref 36–46)
HEMOGLOBIN: 10.3 G/DL (ref 12–16)
MCH RBC QN AUTO: 31.1 PG (ref 26–34)
MCHC RBC AUTO-ENTMCNC: 33.1 G/DL (ref 31–37)
MCV RBC AUTO: 94 FL (ref 80–100)
METHEMOGLOBIN: ABNORMAL % (ref 0–1.9)
MODE: ABNORMAL
NEGATIVE BASE EXCESS, ART: ABNORMAL MMOL/L (ref 0–2)
NOTIFICATION TIME: ABNORMAL
NOTIFICATION: ABNORMAL
O2 DEVICE/FLOW/%: ABNORMAL
O2 SAT, ARTERIAL: 96.9 % (ref 95–98)
OXYHEMOGLOBIN: ABNORMAL % (ref 95–98)
PATIENT TEMP: 37
PCO2 ARTERIAL: 77.4 MMHG (ref 35–45)
PCO2, ART, TEMP ADJ: ABNORMAL
PDW BLD-RTO: 13.3 % (ref 12.1–15.2)
PEEP/CPAP: ABNORMAL
PH ARTERIAL: 7.33 (ref 7.35–7.45)
PH, ART, TEMP ADJ: ABNORMAL (ref 7.35–7.45)
PLATELET # BLD: 198 K/UL (ref 140–450)
PMV BLD AUTO: 8.1 FL (ref 6–12)
PO2 ARTERIAL: 100.4 MMHG (ref 80–100)
PO2, ART, TEMP ADJ: ABNORMAL MMHG (ref 80–100)
POSITIVE BASE EXCESS, ART: 10.4 MMOL/L (ref 0–2)
POTASSIUM SERPL-SCNC: 4.4 MMOL/L (ref 3.7–5.3)
PSV: ABNORMAL
PT. POSITION: ABNORMAL
RBC # BLD: 3.32 M/UL (ref 4–5.2)
RESPIRATORY RATE: 22
SAMPLE SITE: ABNORMAL
SET RATE: ABNORMAL
SODIUM BLD-SCNC: 137 MMOL/L (ref 135–144)
TEXT FOR RESPIRATORY: ABNORMAL
TOTAL HB: ABNORMAL G/DL (ref 12–16)
TOTAL RATE: ABNORMAL
VT: ABNORMAL
WBC # BLD: 7.8 K/UL (ref 3.5–11)

## 2017-08-09 PROCEDURE — 6360000002 HC RX W HCPCS: Performed by: PHYSICIAN ASSISTANT

## 2017-08-09 PROCEDURE — 94640 AIRWAY INHALATION TREATMENT: CPT

## 2017-08-09 PROCEDURE — 94660 CPAP INITIATION&MGMT: CPT

## 2017-08-09 PROCEDURE — 80048 BASIC METABOLIC PNL TOTAL CA: CPT

## 2017-08-09 PROCEDURE — 94761 N-INVAS EAR/PLS OXIMETRY MLT: CPT

## 2017-08-09 PROCEDURE — 94668 MNPJ CHEST WALL SBSQ: CPT

## 2017-08-09 PROCEDURE — 1200000000 HC SEMI PRIVATE

## 2017-08-09 PROCEDURE — 97116 GAIT TRAINING THERAPY: CPT

## 2017-08-09 PROCEDURE — 6360000002 HC RX W HCPCS: Performed by: INTERNAL MEDICINE

## 2017-08-09 PROCEDURE — 94664 DEMO&/EVAL PT USE INHALER: CPT

## 2017-08-09 PROCEDURE — 97535 SELF CARE MNGMENT TRAINING: CPT

## 2017-08-09 PROCEDURE — 36415 COLL VENOUS BLD VENIPUNCTURE: CPT

## 2017-08-09 PROCEDURE — 82805 BLOOD GASES W/O2 SATURATION: CPT

## 2017-08-09 PROCEDURE — 2580000003 HC RX 258: Performed by: INTERNAL MEDICINE

## 2017-08-09 PROCEDURE — 6370000000 HC RX 637 (ALT 250 FOR IP): Performed by: INTERNAL MEDICINE

## 2017-08-09 PROCEDURE — 85027 COMPLETE CBC AUTOMATED: CPT

## 2017-08-09 PROCEDURE — 36600 WITHDRAWAL OF ARTERIAL BLOOD: CPT

## 2017-08-09 PROCEDURE — 97110 THERAPEUTIC EXERCISES: CPT

## 2017-08-09 RX ADMIN — Medication 10 ML: at 08:31

## 2017-08-09 RX ADMIN — FAMOTIDINE 20 MG: 20 TABLET ORAL at 08:31

## 2017-08-09 RX ADMIN — METHYLPREDNISOLONE SODIUM SUCCINATE 125 MG: 125 INJECTION, POWDER, FOR SOLUTION INTRAMUSCULAR; INTRAVENOUS at 08:31

## 2017-08-09 RX ADMIN — ALBUTEROL SULFATE 2.5 MG: 2.5 SOLUTION RESPIRATORY (INHALATION) at 05:55

## 2017-08-09 RX ADMIN — CEFTRIAXONE 1 G: 1 INJECTION, POWDER, FOR SOLUTION INTRAMUSCULAR; INTRAVENOUS at 21:12

## 2017-08-09 RX ADMIN — ALBUTEROL SULFATE 2.5 MG: 2.5 SOLUTION RESPIRATORY (INHALATION) at 20:27

## 2017-08-09 RX ADMIN — AZITHROMYCIN MONOHYDRATE 500 MG: 500 INJECTION, POWDER, LYOPHILIZED, FOR SOLUTION INTRAVENOUS at 21:45

## 2017-08-09 RX ADMIN — Medication 1 CAPSULE: at 08:31

## 2017-08-09 RX ADMIN — LEVOTHYROXINE SODIUM 200 MCG: 100 TABLET ORAL at 07:29

## 2017-08-09 RX ADMIN — Medication 10 ML: at 21:18

## 2017-08-09 RX ADMIN — FLUTICASONE PROPIONATE 1 SPRAY: 50 SPRAY, METERED NASAL at 08:32

## 2017-08-09 RX ADMIN — ALBUTEROL SULFATE 2.5 MG: 2.5 SOLUTION RESPIRATORY (INHALATION) at 16:12

## 2017-08-09 RX ADMIN — ALBUTEROL SULFATE 2.5 MG: 2.5 SOLUTION RESPIRATORY (INHALATION) at 11:40

## 2017-08-09 RX ADMIN — ENOXAPARIN SODIUM 40 MG: 40 INJECTION SUBCUTANEOUS at 08:31

## 2017-08-09 ASSESSMENT — PAIN SCALES - GENERAL
PAINLEVEL_OUTOF10: 0

## 2017-08-10 LAB
ALLEN TEST: ABNORMAL
ANION GAP SERPL CALCULATED.3IONS-SCNC: 5 MMOL/L (ref 9–17)
BUN BLDV-MCNC: 12 MG/DL (ref 8–23)
BUN/CREAT BLD: 26 (ref 9–20)
CALCIUM SERPL-MCNC: 8.5 MG/DL (ref 8.6–10.4)
CARBOXYHEMOGLOBIN: ABNORMAL % (ref 0–5)
CHLORIDE BLD-SCNC: 98 MMOL/L (ref 98–107)
CO2: 39 MMOL/L (ref 20–31)
CREAT SERPL-MCNC: 0.46 MG/DL (ref 0.5–0.9)
CULTURE: NORMAL
FIO2: ABNORMAL
GFR AFRICAN AMERICAN: >60 ML/MIN
GFR NON-AFRICAN AMERICAN: >60 ML/MIN
GFR SERPL CREATININE-BSD FRML MDRD: ABNORMAL ML/MIN/{1.73_M2}
GFR SERPL CREATININE-BSD FRML MDRD: ABNORMAL ML/MIN/{1.73_M2}
GLUCOSE BLD-MCNC: 103 MG/DL (ref 70–99)
HCO3 ARTERIAL: 38.8 MMOL/L (ref 22–26)
HCT VFR BLD CALC: 30.6 % (ref 36–46)
HEMOGLOBIN: 10.1 G/DL (ref 12–16)
Lab: NORMAL
Lab: NORMAL
MCH RBC QN AUTO: 30.5 PG (ref 26–34)
MCHC RBC AUTO-ENTMCNC: 32.9 G/DL (ref 31–37)
MCV RBC AUTO: 93 FL (ref 80–100)
METHEMOGLOBIN: ABNORMAL % (ref 0–1.9)
MODE: ABNORMAL
NEGATIVE BASE EXCESS, ART: ABNORMAL MMOL/L (ref 0–2)
NOTIFICATION TIME: ABNORMAL
NOTIFICATION: ABNORMAL
O2 DEVICE/FLOW/%: ABNORMAL
O2 SAT, ARTERIAL: 95.1 % (ref 95–98)
OXYHEMOGLOBIN: ABNORMAL % (ref 95–98)
PATIENT TEMP: 37
PCO2 ARTERIAL: 58.9 MMHG (ref 35–45)
PCO2, ART, TEMP ADJ: ABNORMAL
PDW BLD-RTO: 12.5 % (ref 12.1–15.2)
PEEP/CPAP: ABNORMAL
PH ARTERIAL: 7.44 (ref 7.35–7.45)
PH, ART, TEMP ADJ: ABNORMAL (ref 7.35–7.45)
PLATELET # BLD: 174 K/UL (ref 140–450)
PMV BLD AUTO: 7.7 FL (ref 6–12)
PO2 ARTERIAL: 75 MMHG (ref 80–100)
PO2, ART, TEMP ADJ: ABNORMAL MMHG (ref 80–100)
POSITIVE BASE EXCESS, ART: 11.9 MMOL/L (ref 0–2)
POTASSIUM SERPL-SCNC: 3.9 MMOL/L (ref 3.7–5.3)
PSV: ABNORMAL
PT. POSITION: ABNORMAL
RBC # BLD: 3.29 M/UL (ref 4–5.2)
RESPIRATORY RATE: 20
SAMPLE SITE: ABNORMAL
SET RATE: ABNORMAL
SODIUM BLD-SCNC: 142 MMOL/L (ref 135–144)
SPECIMEN DESCRIPTION: NORMAL
SPECIMEN DESCRIPTION: NORMAL
STATUS: NORMAL
STATUS: NORMAL
TEXT FOR RESPIRATORY: ABNORMAL
TOTAL HB: ABNORMAL G/DL (ref 12–16)
TOTAL RATE: ABNORMAL
VT: ABNORMAL
WBC # BLD: 6.3 K/UL (ref 3.5–11)

## 2017-08-10 PROCEDURE — 1200000000 HC SEMI PRIVATE

## 2017-08-10 PROCEDURE — 6370000000 HC RX 637 (ALT 250 FOR IP): Performed by: INTERNAL MEDICINE

## 2017-08-10 PROCEDURE — 82805 BLOOD GASES W/O2 SATURATION: CPT

## 2017-08-10 PROCEDURE — 97110 THERAPEUTIC EXERCISES: CPT

## 2017-08-10 PROCEDURE — 2580000003 HC RX 258: Performed by: INTERNAL MEDICINE

## 2017-08-10 PROCEDURE — 94668 MNPJ CHEST WALL SBSQ: CPT

## 2017-08-10 PROCEDURE — 97116 GAIT TRAINING THERAPY: CPT

## 2017-08-10 PROCEDURE — 94660 CPAP INITIATION&MGMT: CPT

## 2017-08-10 PROCEDURE — 6360000002 HC RX W HCPCS: Performed by: INTERNAL MEDICINE

## 2017-08-10 PROCEDURE — 99223 1ST HOSP IP/OBS HIGH 75: CPT | Performed by: INTERNAL MEDICINE

## 2017-08-10 PROCEDURE — 94664 DEMO&/EVAL PT USE INHALER: CPT

## 2017-08-10 PROCEDURE — 94640 AIRWAY INHALATION TREATMENT: CPT

## 2017-08-10 PROCEDURE — 36415 COLL VENOUS BLD VENIPUNCTURE: CPT

## 2017-08-10 PROCEDURE — 80048 BASIC METABOLIC PNL TOTAL CA: CPT

## 2017-08-10 PROCEDURE — 36600 WITHDRAWAL OF ARTERIAL BLOOD: CPT

## 2017-08-10 PROCEDURE — 97530 THERAPEUTIC ACTIVITIES: CPT

## 2017-08-10 PROCEDURE — 85027 COMPLETE CBC AUTOMATED: CPT

## 2017-08-10 PROCEDURE — 6360000002 HC RX W HCPCS: Performed by: PHYSICIAN ASSISTANT

## 2017-08-10 RX ORDER — METHYLPREDNISOLONE SODIUM SUCCINATE 40 MG/ML
40 INJECTION, POWDER, LYOPHILIZED, FOR SOLUTION INTRAMUSCULAR; INTRAVENOUS EVERY 8 HOURS
Status: DISCONTINUED | OUTPATIENT
Start: 2017-08-10 | End: 2017-08-11 | Stop reason: HOSPADM

## 2017-08-10 RX ORDER — PANTOPRAZOLE SODIUM 40 MG/1
40 TABLET, DELAYED RELEASE ORAL
Status: DISCONTINUED | OUTPATIENT
Start: 2017-08-11 | End: 2017-08-11 | Stop reason: HOSPADM

## 2017-08-10 RX ADMIN — Medication 1 CAPSULE: at 08:35

## 2017-08-10 RX ADMIN — ENOXAPARIN SODIUM 40 MG: 40 INJECTION SUBCUTANEOUS at 08:35

## 2017-08-10 RX ADMIN — FAMOTIDINE 20 MG: 20 TABLET ORAL at 08:35

## 2017-08-10 RX ADMIN — ALBUTEROL SULFATE 2.5 MG: 2.5 SOLUTION RESPIRATORY (INHALATION) at 06:04

## 2017-08-10 RX ADMIN — ALBUTEROL SULFATE 2.5 MG: 2.5 SOLUTION RESPIRATORY (INHALATION) at 21:35

## 2017-08-10 RX ADMIN — BENZONATATE 100 MG: 100 CAPSULE ORAL at 15:23

## 2017-08-10 RX ADMIN — LEVOTHYROXINE SODIUM 200 MCG: 100 TABLET ORAL at 07:53

## 2017-08-10 RX ADMIN — METHYLPREDNISOLONE SODIUM SUCCINATE 125 MG: 125 INJECTION, POWDER, FOR SOLUTION INTRAMUSCULAR; INTRAVENOUS at 08:35

## 2017-08-10 RX ADMIN — ALBUTEROL SULFATE 2.5 MG: 2.5 SOLUTION RESPIRATORY (INHALATION) at 10:25

## 2017-08-10 RX ADMIN — Medication 10 ML: at 16:43

## 2017-08-10 RX ADMIN — ALBUTEROL SULFATE 2.5 MG: 2.5 SOLUTION RESPIRATORY (INHALATION) at 16:33

## 2017-08-10 RX ADMIN — AZITHROMYCIN MONOHYDRATE 500 MG: 500 INJECTION, POWDER, LYOPHILIZED, FOR SOLUTION INTRAVENOUS at 20:00

## 2017-08-10 RX ADMIN — FLUTICASONE PROPIONATE 1 SPRAY: 50 SPRAY, METERED NASAL at 08:42

## 2017-08-10 RX ADMIN — CEFTRIAXONE 1 G: 1 INJECTION, POWDER, FOR SOLUTION INTRAMUSCULAR; INTRAVENOUS at 21:14

## 2017-08-10 RX ADMIN — BENZONATATE 100 MG: 100 CAPSULE ORAL at 03:40

## 2017-08-10 RX ADMIN — Medication 10 ML: at 08:36

## 2017-08-10 RX ADMIN — METHYLPREDNISOLONE SODIUM SUCCINATE 40 MG: 40 INJECTION, POWDER, LYOPHILIZED, FOR SOLUTION INTRAMUSCULAR; INTRAVENOUS at 16:43

## 2017-08-10 RX ADMIN — MOMETASONE FUROATE AND FORMOTEROL FUMARATE DIHYDRATE 2 PUFF: 200; 5 AEROSOL RESPIRATORY (INHALATION) at 21:36

## 2017-08-10 RX ADMIN — Medication 10 ML: at 20:02

## 2017-08-10 ASSESSMENT — PAIN DESCRIPTION - LOCATION
LOCATION: BACK
LOCATION: BACK

## 2017-08-10 ASSESSMENT — PAIN DESCRIPTION - FREQUENCY: FREQUENCY: INTERMITTENT

## 2017-08-10 ASSESSMENT — PAIN DESCRIPTION - DESCRIPTORS: DESCRIPTORS: ACHING

## 2017-08-10 ASSESSMENT — PAIN SCALES - GENERAL
PAINLEVEL_OUTOF10: 2
PAINLEVEL_OUTOF10: 5

## 2017-08-10 ASSESSMENT — PAIN DESCRIPTION - PAIN TYPE
TYPE: ACUTE PAIN
TYPE: ACUTE PAIN

## 2017-08-11 VITALS
RESPIRATION RATE: 18 BRPM | TEMPERATURE: 97.9 F | BODY MASS INDEX: 29.02 KG/M2 | DIASTOLIC BLOOD PRESSURE: 64 MMHG | HEIGHT: 61 IN | SYSTOLIC BLOOD PRESSURE: 129 MMHG | OXYGEN SATURATION: 90 % | WEIGHT: 153.7 LBS | HEART RATE: 80 BPM

## 2017-08-11 PROBLEM — J96.12 CHRONIC HYPERCAPNIC RESPIRATORY FAILURE (HCC): Status: ACTIVE | Noted: 2017-08-11

## 2017-08-11 LAB
ANION GAP SERPL CALCULATED.3IONS-SCNC: 11 MMOL/L (ref 9–17)
BUN BLDV-MCNC: 11 MG/DL (ref 8–23)
BUN/CREAT BLD: 22 (ref 9–20)
CALCIUM SERPL-MCNC: 8.7 MG/DL (ref 8.6–10.4)
CHLORIDE BLD-SCNC: 96 MMOL/L (ref 98–107)
CO2: 36 MMOL/L (ref 20–31)
CREAT SERPL-MCNC: 0.5 MG/DL (ref 0.5–0.9)
GFR AFRICAN AMERICAN: >60 ML/MIN
GFR NON-AFRICAN AMERICAN: >60 ML/MIN
GFR SERPL CREATININE-BSD FRML MDRD: ABNORMAL ML/MIN/{1.73_M2}
GFR SERPL CREATININE-BSD FRML MDRD: ABNORMAL ML/MIN/{1.73_M2}
GLUCOSE BLD-MCNC: 144 MG/DL (ref 70–99)
HCT VFR BLD CALC: 34.6 % (ref 36–46)
HEMOGLOBIN: 11.6 G/DL (ref 12–16)
MCH RBC QN AUTO: 31 PG (ref 26–34)
MCHC RBC AUTO-ENTMCNC: 33.4 G/DL (ref 31–37)
MCV RBC AUTO: 92.8 FL (ref 80–100)
PDW BLD-RTO: 13.3 % (ref 12.1–15.2)
PLATELET # BLD: 227 K/UL (ref 140–450)
PMV BLD AUTO: 7.5 FL (ref 6–12)
POTASSIUM SERPL-SCNC: 3.7 MMOL/L (ref 3.7–5.3)
RBC # BLD: 3.72 M/UL (ref 4–5.2)
SODIUM BLD-SCNC: 143 MMOL/L (ref 135–144)
WBC # BLD: 4.2 K/UL (ref 3.5–11)

## 2017-08-11 PROCEDURE — 6360000002 HC RX W HCPCS: Performed by: INTERNAL MEDICINE

## 2017-08-11 PROCEDURE — 94660 CPAP INITIATION&MGMT: CPT

## 2017-08-11 PROCEDURE — 94668 MNPJ CHEST WALL SBSQ: CPT

## 2017-08-11 PROCEDURE — 6370000000 HC RX 637 (ALT 250 FOR IP): Performed by: INTERNAL MEDICINE

## 2017-08-11 PROCEDURE — 80048 BASIC METABOLIC PNL TOTAL CA: CPT

## 2017-08-11 PROCEDURE — 2580000003 HC RX 258: Performed by: INTERNAL MEDICINE

## 2017-08-11 PROCEDURE — 94640 AIRWAY INHALATION TREATMENT: CPT

## 2017-08-11 PROCEDURE — 97110 THERAPEUTIC EXERCISES: CPT

## 2017-08-11 PROCEDURE — 94664 DEMO&/EVAL PT USE INHALER: CPT

## 2017-08-11 PROCEDURE — 97116 GAIT TRAINING THERAPY: CPT

## 2017-08-11 PROCEDURE — 36415 COLL VENOUS BLD VENIPUNCTURE: CPT

## 2017-08-11 PROCEDURE — 85027 COMPLETE CBC AUTOMATED: CPT

## 2017-08-11 RX ORDER — LACTOBACILLUS RHAMNOSUS GG 10B CELL
1 CAPSULE ORAL
Qty: 30 CAPSULE | DISCHARGE
Start: 2017-08-11 | End: 2019-07-16

## 2017-08-11 RX ORDER — PANTOPRAZOLE SODIUM 40 MG/1
40 TABLET, DELAYED RELEASE ORAL
Qty: 30 TABLET | Refills: 3 | DISCHARGE
Start: 2017-08-11 | End: 2017-12-19

## 2017-08-11 RX ORDER — LEVOFLOXACIN 500 MG/1
500 TABLET, FILM COATED ORAL DAILY
Refills: 0 | DISCHARGE
Start: 2017-08-11 | End: 2017-12-19 | Stop reason: SDUPTHER

## 2017-08-11 RX ORDER — BENZONATATE 100 MG/1
100 CAPSULE ORAL 3 TIMES DAILY PRN
DISCHARGE
Start: 2017-08-11 | End: 2018-05-09

## 2017-08-11 RX ORDER — PREDNISONE 10 MG/1
TABLET ORAL
Qty: 30 TABLET | Refills: 0 | DISCHARGE
Start: 2017-08-11 | End: 2017-09-06

## 2017-08-11 RX ORDER — ATORVASTATIN CALCIUM 40 MG/1
40 TABLET, FILM COATED ORAL NIGHTLY
Status: DISCONTINUED | OUTPATIENT
Start: 2017-08-11 | End: 2017-08-11

## 2017-08-11 RX ADMIN — FLUTICASONE PROPIONATE 1 SPRAY: 50 SPRAY, METERED NASAL at 09:00

## 2017-08-11 RX ADMIN — ALBUTEROL SULFATE 2.5 MG: 2.5 SOLUTION RESPIRATORY (INHALATION) at 05:50

## 2017-08-11 RX ADMIN — ALBUTEROL SULFATE 2.5 MG: 2.5 SOLUTION RESPIRATORY (INHALATION) at 11:00

## 2017-08-11 RX ADMIN — ENOXAPARIN SODIUM 40 MG: 40 INJECTION SUBCUTANEOUS at 09:38

## 2017-08-11 RX ADMIN — Medication 10 ML: at 09:38

## 2017-08-11 RX ADMIN — LEVOTHYROXINE SODIUM 200 MCG: 100 TABLET ORAL at 07:25

## 2017-08-11 RX ADMIN — MOMETASONE FUROATE AND FORMOTEROL FUMARATE DIHYDRATE 2 PUFF: 200; 5 AEROSOL RESPIRATORY (INHALATION) at 11:01

## 2017-08-11 RX ADMIN — Medication 1 CAPSULE: at 08:00

## 2017-08-11 RX ADMIN — METHYLPREDNISOLONE SODIUM SUCCINATE 40 MG: 40 INJECTION, POWDER, LYOPHILIZED, FOR SOLUTION INTRAMUSCULAR; INTRAVENOUS at 01:22

## 2017-08-11 RX ADMIN — METHYLPREDNISOLONE SODIUM SUCCINATE 40 MG: 40 INJECTION, POWDER, LYOPHILIZED, FOR SOLUTION INTRAMUSCULAR; INTRAVENOUS at 09:38

## 2017-08-11 RX ADMIN — PANTOPRAZOLE SODIUM 40 MG: 40 TABLET, DELAYED RELEASE ORAL at 07:25

## 2017-08-11 ASSESSMENT — PAIN SCALES - GENERAL
PAINLEVEL_OUTOF10: 0
PAINLEVEL_OUTOF10: 0

## 2017-08-21 ENCOUNTER — OFFICE VISIT (OUTPATIENT)
Dept: PULMONOLOGY | Age: 67
End: 2017-08-21
Payer: MEDICARE

## 2017-08-21 VITALS
OXYGEN SATURATION: 98 % | HEART RATE: 98 BPM | BODY MASS INDEX: 28.32 KG/M2 | HEIGHT: 61 IN | SYSTOLIC BLOOD PRESSURE: 132 MMHG | TEMPERATURE: 98.5 F | DIASTOLIC BLOOD PRESSURE: 67 MMHG | RESPIRATION RATE: 16 BRPM | WEIGHT: 150 LBS

## 2017-08-21 DIAGNOSIS — K21.9 GASTROESOPHAGEAL REFLUX DISEASE, ESOPHAGITIS PRESENCE NOT SPECIFIED: ICD-10-CM

## 2017-08-21 DIAGNOSIS — J45.30 MILD PERSISTENT ASTHMA WITHOUT COMPLICATION: ICD-10-CM

## 2017-08-21 DIAGNOSIS — J84.9 ILD (INTERSTITIAL LUNG DISEASE) (HCC): ICD-10-CM

## 2017-08-21 DIAGNOSIS — J98.4 RESTRICTIVE LUNG DISEASE DUE TO KYPHOSCOLIOSIS: ICD-10-CM

## 2017-08-21 DIAGNOSIS — M41.9 RESTRICTIVE LUNG DISEASE DUE TO KYPHOSCOLIOSIS: ICD-10-CM

## 2017-08-21 DIAGNOSIS — J47.9 BRONCHIECTASIS WITHOUT COMPLICATION (HCC): ICD-10-CM

## 2017-08-21 DIAGNOSIS — R09.82 POST-NASAL DRIP: ICD-10-CM

## 2017-08-21 DIAGNOSIS — G47.33 OSA (OBSTRUCTIVE SLEEP APNEA): ICD-10-CM

## 2017-08-21 DIAGNOSIS — M41.9 KYPHOSCOLIOSIS: ICD-10-CM

## 2017-08-21 DIAGNOSIS — R05.3 CHRONIC COUGH: ICD-10-CM

## 2017-08-21 DIAGNOSIS — J84.10 PULMONARY FIBROSIS DETERMINED BY HIGH RESOLUTION COMPUTED TOMOGRAPHY (HCC): ICD-10-CM

## 2017-08-21 DIAGNOSIS — J96.22 ACUTE ON CHRONIC RESPIRATORY FAILURE WITH HYPERCAPNIA (HCC): Primary | ICD-10-CM

## 2017-08-21 PROCEDURE — 1036F TOBACCO NON-USER: CPT | Performed by: INTERNAL MEDICINE

## 2017-08-21 PROCEDURE — 4040F PNEUMOC VAC/ADMIN/RCVD: CPT | Performed by: INTERNAL MEDICINE

## 2017-08-21 PROCEDURE — G8427 DOCREV CUR MEDS BY ELIG CLIN: HCPCS | Performed by: INTERNAL MEDICINE

## 2017-08-21 PROCEDURE — 99215 OFFICE O/P EST HI 40 MIN: CPT | Performed by: INTERNAL MEDICINE

## 2017-08-21 PROCEDURE — G8399 PT W/DXA RESULTS DOCUMENT: HCPCS | Performed by: INTERNAL MEDICINE

## 2017-08-21 PROCEDURE — 3014F SCREEN MAMMO DOC REV: CPT | Performed by: INTERNAL MEDICINE

## 2017-08-21 PROCEDURE — 1111F DSCHRG MED/CURRENT MED MERGE: CPT | Performed by: INTERNAL MEDICINE

## 2017-08-21 PROCEDURE — G8419 CALC BMI OUT NRM PARAM NOF/U: HCPCS | Performed by: INTERNAL MEDICINE

## 2017-08-21 PROCEDURE — 1090F PRES/ABSN URINE INCON ASSESS: CPT | Performed by: INTERNAL MEDICINE

## 2017-08-21 PROCEDURE — 3017F COLORECTAL CA SCREEN DOC REV: CPT | Performed by: INTERNAL MEDICINE

## 2017-08-21 PROCEDURE — 1123F ACP DISCUSS/DSCN MKR DOCD: CPT | Performed by: INTERNAL MEDICINE

## 2017-08-23 ENCOUNTER — HOSPITAL ENCOUNTER (OUTPATIENT)
Dept: SLEEP CENTER | Age: 67
Discharge: HOME | End: 2017-08-23
Payer: MEDICARE

## 2017-08-23 DIAGNOSIS — G47.33 OSA (OBSTRUCTIVE SLEEP APNEA): ICD-10-CM

## 2017-08-23 PROCEDURE — 95811 POLYSOM 6/>YRS CPAP 4/> PARM: CPT

## 2017-08-23 ASSESSMENT — SLEEP AND FATIGUE QUESTIONNAIRES
HOW LIKELY ARE YOU TO NOD OFF OR FALL ASLEEP WHEN YOU ARE A PASSENGER IN A CAR FOR AN HOUR WITHOUT A BREAK: 0
HOW LIKELY ARE YOU TO NOD OFF OR FALL ASLEEP WHILE SITTING AND READING: 1
HOW LIKELY ARE YOU TO NOD OFF OR FALL ASLEEP WHILE SITTING QUIETLY AFTER LUNCH WITHOUT ALCOHOL: 1
HOW LIKELY ARE YOU TO NOD OFF OR FALL ASLEEP WHILE SITTING INACTIVE IN A PUBLIC PLACE: 0
HOW LIKELY ARE YOU TO NOD OFF OR FALL ASLEEP WHILE LYING DOWN TO REST IN THE AFTERNOON WHEN CIRCUMSTANCES PERMIT: 0
HOW LIKELY ARE YOU TO NOD OFF OR FALL ASLEEP WHILE WATCHING TV: 1
HOW LIKELY ARE YOU TO NOD OFF OR FALL ASLEEP IN A CAR, WHILE STOPPED FOR A FEW MINUTES IN TRAFFIC: 1
HOW LIKELY ARE YOU TO NOD OFF OR FALL ASLEEP WHILE SITTING AND TALKING TO SOMEONE: 0
ESS TOTAL SCORE: 4

## 2017-08-30 ENCOUNTER — TELEPHONE (OUTPATIENT)
Dept: PULMONOLOGY | Age: 67
End: 2017-08-30

## 2017-08-30 DIAGNOSIS — J96.12 CHRONIC HYPERCAPNIC RESPIRATORY FAILURE (HCC): Primary | ICD-10-CM

## 2017-11-01 ENCOUNTER — HOSPITAL ENCOUNTER (INPATIENT)
Age: 67
LOS: 5 days | Discharge: HOME | DRG: 871 | End: 2017-11-07
Attending: EMERGENCY MEDICINE | Admitting: INTERNAL MEDICINE
Payer: MEDICARE

## 2017-11-01 DIAGNOSIS — J18.9 PNEUMONIA DUE TO ORGANISM: Primary | ICD-10-CM

## 2017-11-01 DIAGNOSIS — K52.9 CHRONIC DIARRHEA: ICD-10-CM

## 2017-11-01 PROCEDURE — 99285 EMERGENCY DEPT VISIT HI MDM: CPT

## 2017-11-01 NOTE — Clinical Note
Patient Class: Inpatient [101]   REQUIRED: Diagnosis: Pneumonia [732854]   Estimated Length of Stay: Estimated stay of more than 2 midnights   Future Attending Provider: Kelli Flood [[id number]]   Telemetry Bed Required?: Yes

## 2017-11-02 ENCOUNTER — APPOINTMENT (OUTPATIENT)
Dept: GENERAL RADIOLOGY | Age: 67
DRG: 871 | End: 2017-11-02
Payer: MEDICARE

## 2017-11-02 PROBLEM — N30.00 ACUTE CYSTITIS: Status: ACTIVE | Noted: 2017-11-02

## 2017-11-02 PROBLEM — G47.33 OSA (OBSTRUCTIVE SLEEP APNEA): Chronic | Status: ACTIVE | Noted: 2017-11-02

## 2017-11-02 PROBLEM — A41.9 SEPSIS (HCC): Status: ACTIVE | Noted: 2017-11-02

## 2017-11-02 PROBLEM — I27.23 PULMONARY HYPERTENSION DUE TO HYPOXIA (HCC): Status: ACTIVE | Noted: 2017-11-02

## 2017-11-02 PROBLEM — J96.21 ACUTE ON CHRONIC RESPIRATORY FAILURE WITH HYPOXIA AND HYPERCAPNIA (HCC): Status: ACTIVE | Noted: 2017-04-10

## 2017-11-02 PROBLEM — J96.22 ACUTE ON CHRONIC RESPIRATORY FAILURE WITH HYPOXIA AND HYPERCAPNIA (HCC): Status: ACTIVE | Noted: 2017-04-10

## 2017-11-02 PROBLEM — E83.42 HYPOMAGNESEMIA: Status: ACTIVE | Noted: 2017-11-02

## 2017-11-02 PROBLEM — J84.112 IPF (IDIOPATHIC PULMONARY FIBROSIS) (HCC): Status: ACTIVE | Noted: 2017-11-02

## 2017-11-02 PROBLEM — M41.9 RESTRICTIVE LUNG DISEASE DUE TO KYPHOSCOLIOSIS: Status: ACTIVE | Noted: 2017-11-02

## 2017-11-02 PROBLEM — I27.81 CHRONIC COR PULMONALE (HCC): Status: ACTIVE | Noted: 2017-11-02

## 2017-11-02 PROBLEM — J18.9 COMMUNITY ACQUIRED PNEUMONIA OF RIGHT LUNG: Status: ACTIVE | Noted: 2017-11-02

## 2017-11-02 PROBLEM — J98.4 RESTRICTIVE LUNG DISEASE DUE TO KYPHOSCOLIOSIS: Status: ACTIVE | Noted: 2017-11-02

## 2017-11-02 LAB
-: ABNORMAL
ABSOLUTE BANDS #: 1.39 K/UL (ref 0–1)
ABSOLUTE EOS #: 0 K/UL (ref 0–0.4)
ABSOLUTE IMMATURE GRANULOCYTE: ABNORMAL K/UL (ref 0–0.3)
ABSOLUTE LYMPH #: 0.38 K/UL (ref 1–4.8)
ABSOLUTE MONO #: 0.76 K/UL (ref 0–1)
ALBUMIN SERPL-MCNC: 3.2 G/DL (ref 3.5–5.2)
ALBUMIN/GLOBULIN RATIO: 0.8 (ref 1–2.5)
ALP BLD-CCNC: 50 U/L (ref 35–104)
ALT SERPL-CCNC: 9 U/L (ref 5–33)
AMORPHOUS: ABNORMAL
ANION GAP SERPL CALCULATED.3IONS-SCNC: 10 MMOL/L (ref 9–17)
AST SERPL-CCNC: 21 U/L
BACTERIA: ABNORMAL
BANDS: 11 %
BASOPHILS # BLD: 0 %
BASOPHILS ABSOLUTE: 0 K/UL (ref 0–0.2)
BILIRUB SERPL-MCNC: 0.42 MG/DL (ref 0.3–1.2)
BILIRUBIN DIRECT: <0.08 MG/DL
BILIRUBIN URINE: NEGATIVE
BILIRUBIN, INDIRECT: ABNORMAL MG/DL (ref 0–1)
BUN BLDV-MCNC: 10 MG/DL (ref 8–23)
BUN/CREAT BLD: 16 (ref 9–20)
CALCIUM SERPL-MCNC: 8.8 MG/DL (ref 8.6–10.4)
CASTS UA: ABNORMAL /LPF
CHLORIDE BLD-SCNC: 95 MMOL/L (ref 98–107)
CO2: 32 MMOL/L (ref 20–31)
COLOR: YELLOW
COMMENT UA: ABNORMAL
CREAT SERPL-MCNC: 0.62 MG/DL (ref 0.5–0.9)
CRYSTALS, UA: ABNORMAL /HPF
DIFFERENTIAL TYPE: ABNORMAL
EOSINOPHILS RELATIVE PERCENT: 0 %
EPITHELIAL CELLS UA: ABNORMAL /HPF (ref 0–25)
GFR AFRICAN AMERICAN: >60 ML/MIN
GFR NON-AFRICAN AMERICAN: >60 ML/MIN
GFR SERPL CREATININE-BSD FRML MDRD: ABNORMAL ML/MIN/{1.73_M2}
GFR SERPL CREATININE-BSD FRML MDRD: ABNORMAL ML/MIN/{1.73_M2}
GLOBULIN: ABNORMAL G/DL (ref 1.5–3.8)
GLUCOSE BLD-MCNC: 111 MG/DL (ref 70–99)
GLUCOSE URINE: NEGATIVE
HCT VFR BLD CALC: 37.3 % (ref 36–46)
HEMOGLOBIN: 12.3 G/DL (ref 12–16)
IMMATURE GRANULOCYTES: ABNORMAL %
KETONES, URINE: ABNORMAL
LACTIC ACID, WHOLE BLOOD: NORMAL MMOL/L (ref 0.7–2.1)
LACTIC ACID: 1.1 MMOL/L (ref 0.5–2.2)
LEUKOCYTE ESTERASE, URINE: ABNORMAL
LYMPHOCYTES # BLD: 3 %
MAGNESIUM: 1.5 MG/DL (ref 1.6–2.6)
MCH RBC QN AUTO: 29.8 PG (ref 26–34)
MCHC RBC AUTO-ENTMCNC: 32.9 G/DL (ref 31–37)
MCV RBC AUTO: 90.7 FL (ref 80–100)
MONOCYTES # BLD: 6 %
MORPHOLOGY: NORMAL
MUCUS: ABNORMAL
NITRITE, URINE: NEGATIVE
OTHER OBSERVATIONS UA: ABNORMAL
PDW BLD-RTO: 13.4 % (ref 12.1–15.2)
PH UA: 6 (ref 5–9)
PLATELET # BLD: 236 K/UL (ref 140–450)
PLATELET ESTIMATE: ABNORMAL
PMV BLD AUTO: 7.4 FL (ref 6–12)
POTASSIUM SERPL-SCNC: 3.7 MMOL/L (ref 3.7–5.3)
PROTEIN UA: ABNORMAL
RBC # BLD: 4.11 M/UL (ref 4–5.2)
RBC # BLD: ABNORMAL 10*6/UL
RBC UA: ABNORMAL /HPF (ref 0–2)
RENAL EPITHELIAL, UA: ABNORMAL /HPF
SEG NEUTROPHILS: 80 %
SEGMENTED NEUTROPHILS ABSOLUTE COUNT: 10.07 K/UL (ref 1.8–7.7)
SODIUM BLD-SCNC: 137 MMOL/L (ref 135–144)
SPECIFIC GRAVITY UA: 1.02 (ref 1.01–1.02)
TOTAL PROTEIN: 7 G/DL (ref 6.4–8.3)
TRICHOMONAS: ABNORMAL
TURBIDITY: ABNORMAL
URINE HGB: NEGATIVE
UROBILINOGEN, URINE: NORMAL
WBC # BLD: 12.6 K/UL (ref 3.5–11)
WBC # BLD: ABNORMAL 10*3/UL
WBC UA: ABNORMAL /HPF (ref 0–5)
YEAST: ABNORMAL

## 2017-11-02 PROCEDURE — 94667 MNPJ CHEST WALL 1ST: CPT

## 2017-11-02 PROCEDURE — 94664 DEMO&/EVAL PT USE INHALER: CPT

## 2017-11-02 PROCEDURE — 6370000000 HC RX 637 (ALT 250 FOR IP): Performed by: EMERGENCY MEDICINE

## 2017-11-02 PROCEDURE — G8989 SELF CARE D/C STATUS: HCPCS

## 2017-11-02 PROCEDURE — 71020 XR CHEST STANDARD TWO VW: CPT

## 2017-11-02 PROCEDURE — 97165 OT EVAL LOW COMPLEX 30 MIN: CPT

## 2017-11-02 PROCEDURE — 6370000000 HC RX 637 (ALT 250 FOR IP): Performed by: INTERNAL MEDICINE

## 2017-11-02 PROCEDURE — 85025 COMPLETE CBC W/AUTO DIFF WBC: CPT

## 2017-11-02 PROCEDURE — 96365 THER/PROPH/DIAG IV INF INIT: CPT

## 2017-11-02 PROCEDURE — 92507 TX SP LANG VOICE COMM INDIV: CPT

## 2017-11-02 PROCEDURE — 6360000002 HC RX W HCPCS: Performed by: EMERGENCY MEDICINE

## 2017-11-02 PROCEDURE — 96375 TX/PRO/DX INJ NEW DRUG ADDON: CPT

## 2017-11-02 PROCEDURE — G8978 MOBILITY CURRENT STATUS: HCPCS

## 2017-11-02 PROCEDURE — 2580000003 HC RX 258: Performed by: INTERNAL MEDICINE

## 2017-11-02 PROCEDURE — 87040 BLOOD CULTURE FOR BACTERIA: CPT

## 2017-11-02 PROCEDURE — 94668 MNPJ CHEST WALL SBSQ: CPT

## 2017-11-02 PROCEDURE — G8979 MOBILITY GOAL STATUS: HCPCS

## 2017-11-02 PROCEDURE — 97161 PT EVAL LOW COMPLEX 20 MIN: CPT

## 2017-11-02 PROCEDURE — G8987 SELF CARE CURRENT STATUS: HCPCS

## 2017-11-02 PROCEDURE — G8998 SWALLOW D/C STATUS: HCPCS

## 2017-11-02 PROCEDURE — 2580000003 HC RX 258: Performed by: PHYSICIAN ASSISTANT

## 2017-11-02 PROCEDURE — 82705 FATS/LIPIDS FECES QUAL: CPT

## 2017-11-02 PROCEDURE — 99221 1ST HOSP IP/OBS SF/LOW 40: CPT | Performed by: INTERNAL MEDICINE

## 2017-11-02 PROCEDURE — 2580000003 HC RX 258: Performed by: EMERGENCY MEDICINE

## 2017-11-02 PROCEDURE — 87324 CLOSTRIDIUM AG IA: CPT

## 2017-11-02 PROCEDURE — G8996 SWALLOW CURRENT STATUS: HCPCS

## 2017-11-02 PROCEDURE — 36415 COLL VENOUS BLD VENIPUNCTURE: CPT

## 2017-11-02 PROCEDURE — 83605 ASSAY OF LACTIC ACID: CPT

## 2017-11-02 PROCEDURE — G8997 SWALLOW GOAL STATUS: HCPCS

## 2017-11-02 PROCEDURE — 6360000002 HC RX W HCPCS: Performed by: INTERNAL MEDICINE

## 2017-11-02 PROCEDURE — G8988 SELF CARE GOAL STATUS: HCPCS

## 2017-11-02 PROCEDURE — 81001 URINALYSIS AUTO W/SCOPE: CPT

## 2017-11-02 PROCEDURE — 6360000002 HC RX W HCPCS: Performed by: PHYSICIAN ASSISTANT

## 2017-11-02 PROCEDURE — 80076 HEPATIC FUNCTION PANEL: CPT

## 2017-11-02 PROCEDURE — 94640 AIRWAY INHALATION TREATMENT: CPT

## 2017-11-02 PROCEDURE — 80048 BASIC METABOLIC PNL TOTAL CA: CPT

## 2017-11-02 PROCEDURE — 83735 ASSAY OF MAGNESIUM: CPT

## 2017-11-02 PROCEDURE — 99222 1ST HOSP IP/OBS MODERATE 55: CPT | Performed by: SURGERY

## 2017-11-02 PROCEDURE — 96376 TX/PRO/DX INJ SAME DRUG ADON: CPT

## 2017-11-02 PROCEDURE — 1200000000 HC SEMI PRIVATE

## 2017-11-02 RX ORDER — SODIUM CHLORIDE 0.9 % (FLUSH) 0.9 %
10 SYRINGE (ML) INJECTION EVERY 12 HOURS SCHEDULED
Status: DISCONTINUED | OUTPATIENT
Start: 2017-11-02 | End: 2017-11-07 | Stop reason: HOSPADM

## 2017-11-02 RX ORDER — ALBUTEROL SULFATE 2.5 MG/3ML
2.5 SOLUTION RESPIRATORY (INHALATION)
Status: DISCONTINUED | OUTPATIENT
Start: 2017-11-02 | End: 2017-11-04

## 2017-11-02 RX ORDER — ONDANSETRON 2 MG/ML
4 INJECTION INTRAMUSCULAR; INTRAVENOUS EVERY 30 MIN PRN
Status: DISCONTINUED | OUTPATIENT
Start: 2017-11-01 | End: 2017-11-02

## 2017-11-02 RX ORDER — PANTOPRAZOLE SODIUM 40 MG/1
40 TABLET, DELAYED RELEASE ORAL
Status: DISCONTINUED | OUTPATIENT
Start: 2017-11-02 | End: 2017-11-07 | Stop reason: HOSPADM

## 2017-11-02 RX ORDER — FUROSEMIDE 20 MG/1
20 TABLET ORAL DAILY PRN
Status: DISCONTINUED | OUTPATIENT
Start: 2017-11-02 | End: 2017-11-07 | Stop reason: HOSPADM

## 2017-11-02 RX ORDER — RALOXIFENE HYDROCHLORIDE 60 MG/1
60 TABLET, FILM COATED ORAL DAILY
Status: DISCONTINUED | OUTPATIENT
Start: 2017-11-02 | End: 2017-11-07 | Stop reason: HOSPADM

## 2017-11-02 RX ORDER — SODIUM CHLORIDE 0.9 % (FLUSH) 0.9 %
10 SYRINGE (ML) INJECTION PRN
Status: DISCONTINUED | OUTPATIENT
Start: 2017-11-02 | End: 2017-11-07 | Stop reason: HOSPADM

## 2017-11-02 RX ORDER — FAMOTIDINE 20 MG/1
20 TABLET, FILM COATED ORAL 2 TIMES DAILY
Status: DISCONTINUED | OUTPATIENT
Start: 2017-11-02 | End: 2017-11-07 | Stop reason: HOSPADM

## 2017-11-02 RX ORDER — HYDROXYCHLOROQUINE SULFATE 200 MG/1
200 TABLET, FILM COATED ORAL 2 TIMES DAILY
Status: DISCONTINUED | OUTPATIENT
Start: 2017-11-02 | End: 2017-11-07 | Stop reason: HOSPADM

## 2017-11-02 RX ORDER — ONDANSETRON 2 MG/ML
4 INJECTION INTRAMUSCULAR; INTRAVENOUS ONCE
Status: COMPLETED | OUTPATIENT
Start: 2017-11-02 | End: 2017-11-02

## 2017-11-02 RX ORDER — BENZONATATE 100 MG/1
100 CAPSULE ORAL 3 TIMES DAILY PRN
Status: DISCONTINUED | OUTPATIENT
Start: 2017-11-02 | End: 2017-11-07 | Stop reason: HOSPADM

## 2017-11-02 RX ORDER — ACETAMINOPHEN 325 MG/1
650 TABLET ORAL EVERY 4 HOURS PRN
Status: DISCONTINUED | OUTPATIENT
Start: 2017-11-02 | End: 2017-11-07 | Stop reason: HOSPADM

## 2017-11-02 RX ORDER — MAGNESIUM SULFATE 1 G/100ML
1 INJECTION INTRAVENOUS ONCE
Status: COMPLETED | OUTPATIENT
Start: 2017-11-02 | End: 2017-11-02

## 2017-11-02 RX ORDER — IBUPROFEN 800 MG/1
800 TABLET ORAL ONCE
Status: COMPLETED | OUTPATIENT
Start: 2017-11-02 | End: 2017-11-02

## 2017-11-02 RX ORDER — DIPHENOXYLATE HYDROCHLORIDE AND ATROPINE SULFATE 2.5; .025 MG/1; MG/1
1 TABLET ORAL 4 TIMES DAILY PRN
Status: DISCONTINUED | OUTPATIENT
Start: 2017-11-02 | End: 2017-11-07 | Stop reason: HOSPADM

## 2017-11-02 RX ORDER — SODIUM CHLORIDE 9 MG/ML
INJECTION, SOLUTION INTRAVENOUS CONTINUOUS
Status: DISCONTINUED | OUTPATIENT
Start: 2017-11-02 | End: 2017-11-03

## 2017-11-02 RX ORDER — 0.9 % SODIUM CHLORIDE 0.9 %
1000 INTRAVENOUS SOLUTION INTRAVENOUS ONCE
Status: COMPLETED | OUTPATIENT
Start: 2017-11-02 | End: 2017-11-02

## 2017-11-02 RX ORDER — AZITHROMYCIN 250 MG/1
250 TABLET, FILM COATED ORAL DAILY
Qty: 4 TABLET | Refills: 0 | Status: SHIPPED | OUTPATIENT
Start: 2017-11-02 | End: 2017-11-07 | Stop reason: HOSPADM

## 2017-11-02 RX ORDER — LEVOTHYROXINE SODIUM 0.1 MG/1
200 TABLET ORAL DAILY
Status: DISCONTINUED | OUTPATIENT
Start: 2017-11-02 | End: 2017-11-07 | Stop reason: HOSPADM

## 2017-11-02 RX ORDER — ONDANSETRON 2 MG/ML
4 INJECTION INTRAMUSCULAR; INTRAVENOUS EVERY 6 HOURS PRN
Status: DISCONTINUED | OUTPATIENT
Start: 2017-11-02 | End: 2017-11-07 | Stop reason: HOSPADM

## 2017-11-02 RX ORDER — FLUTICASONE PROPIONATE 50 MCG
1 SPRAY, SUSPENSION (ML) NASAL DAILY
Status: DISCONTINUED | OUTPATIENT
Start: 2017-11-02 | End: 2017-11-07 | Stop reason: HOSPADM

## 2017-11-02 RX ORDER — ACETAMINOPHEN 500 MG
1000 TABLET ORAL ONCE
Status: COMPLETED | OUTPATIENT
Start: 2017-11-02 | End: 2017-11-02

## 2017-11-02 RX ORDER — ALBUTEROL SULFATE 2.5 MG/3ML
2.5 SOLUTION RESPIRATORY (INHALATION) EVERY 6 HOURS PRN
Status: DISCONTINUED | OUTPATIENT
Start: 2017-11-02 | End: 2017-11-02 | Stop reason: SDUPTHER

## 2017-11-02 RX ADMIN — IBUPROFEN 800 MG: 800 TABLET, FILM COATED ORAL at 01:20

## 2017-11-02 RX ADMIN — MOMETASONE FUROATE AND FORMOTEROL FUMARATE DIHYDRATE 2 PUFF: 200; 5 AEROSOL RESPIRATORY (INHALATION) at 11:18

## 2017-11-02 RX ADMIN — FUROSEMIDE 20 MG: 20 TABLET ORAL at 09:39

## 2017-11-02 RX ADMIN — AZITHROMYCIN MONOHYDRATE 500 MG: 500 INJECTION, POWDER, LYOPHILIZED, FOR SOLUTION INTRAVENOUS at 04:41

## 2017-11-02 RX ADMIN — ACETAMINOPHEN 1000 MG: 500 TABLET ORAL at 01:20

## 2017-11-02 RX ADMIN — Medication 10 ML: at 09:59

## 2017-11-02 RX ADMIN — SODIUM CHLORIDE: 9 INJECTION, SOLUTION INTRAVENOUS at 18:59

## 2017-11-02 RX ADMIN — ONDANSETRON 4 MG: 2 INJECTION INTRAMUSCULAR; INTRAVENOUS at 05:50

## 2017-11-02 RX ADMIN — FLUTICASONE PROPIONATE 1 SPRAY: 50 SPRAY, METERED NASAL at 09:39

## 2017-11-02 RX ADMIN — HYDROXYCHLOROQUINE SULFATE 200 MG: 200 TABLET ORAL at 09:39

## 2017-11-02 RX ADMIN — LEVOTHYROXINE SODIUM 200 MCG: 100 TABLET ORAL at 09:08

## 2017-11-02 RX ADMIN — SODIUM CHLORIDE 1000 ML: 9 INJECTION, SOLUTION INTRAVENOUS at 00:27

## 2017-11-02 RX ADMIN — ALBUTEROL SULFATE 2.5 MG: 2.5 SOLUTION RESPIRATORY (INHALATION) at 15:00

## 2017-11-02 RX ADMIN — SODIUM CHLORIDE 1000 ML: 9 INJECTION, SOLUTION INTRAVENOUS at 04:41

## 2017-11-02 RX ADMIN — CEFTRIAXONE 1 G: 1 INJECTION, POWDER, FOR SOLUTION INTRAMUSCULAR; INTRAVENOUS at 06:47

## 2017-11-02 RX ADMIN — ALBUTEROL SULFATE 2.5 MG: 2.5 SOLUTION RESPIRATORY (INHALATION) at 21:55

## 2017-11-02 RX ADMIN — PANTOPRAZOLE SODIUM 40 MG: 40 TABLET, DELAYED RELEASE ORAL at 09:39

## 2017-11-02 RX ADMIN — MAGNESIUM SULFATE HEPTAHYDRATE 1 G: 1 INJECTION, SOLUTION INTRAVENOUS at 09:55

## 2017-11-02 RX ADMIN — BENZONATATE 100 MG: 100 CAPSULE ORAL at 14:44

## 2017-11-02 RX ADMIN — DIPHENOXYLATE HYDROCHLORIDE AND ATROPINE SULFATE 1 TABLET: 2.5; .025 TABLET ORAL at 14:44

## 2017-11-02 RX ADMIN — ALBUTEROL SULFATE 2.5 MG: 2.5 SOLUTION RESPIRATORY (INHALATION) at 11:18

## 2017-11-02 RX ADMIN — HYDROXYCHLOROQUINE SULFATE 200 MG: 200 TABLET ORAL at 20:03

## 2017-11-02 RX ADMIN — FAMOTIDINE 20 MG: 20 TABLET ORAL at 20:03

## 2017-11-02 RX ADMIN — ENOXAPARIN SODIUM 40 MG: 40 INJECTION SUBCUTANEOUS at 09:39

## 2017-11-02 RX ADMIN — BENZONATATE 100 MG: 100 CAPSULE ORAL at 21:05

## 2017-11-02 RX ADMIN — RALOXIFENE HYDROCHLORIDE 60 MG: 60 TABLET, FILM COATED ORAL at 14:37

## 2017-11-02 RX ADMIN — ONDANSETRON 4 MG: 2 INJECTION INTRAMUSCULAR; INTRAVENOUS at 00:27

## 2017-11-02 RX ADMIN — SODIUM CHLORIDE: 9 INJECTION, SOLUTION INTRAVENOUS at 08:58

## 2017-11-02 RX ADMIN — MOMETASONE FUROATE AND FORMOTEROL FUMARATE DIHYDRATE 2 PUFF: 200; 5 AEROSOL RESPIRATORY (INHALATION) at 21:51

## 2017-11-02 RX ADMIN — ACETAMINOPHEN 650 MG: 325 TABLET, FILM COATED ORAL at 14:47

## 2017-11-02 RX ADMIN — FAMOTIDINE 20 MG: 20 TABLET ORAL at 09:40

## 2017-11-02 RX ADMIN — DIPHENOXYLATE HYDROCHLORIDE AND ATROPINE SULFATE 1 TABLET: 2.5; .025 TABLET ORAL at 09:39

## 2017-11-02 ASSESSMENT — ENCOUNTER SYMPTOMS
COUGH: 0
SORE THROAT: 0
GASTROINTESTINAL NEGATIVE: 1
SHORTNESS OF BREATH: 1
EYES NEGATIVE: 1
CHEST TIGHTNESS: 1

## 2017-11-02 ASSESSMENT — PAIN SCALES - GENERAL
PAINLEVEL_OUTOF10: 0
PAINLEVEL_OUTOF10: 0
PAINLEVEL_OUTOF10: 6
PAINLEVEL_OUTOF10: 0

## 2017-11-02 ASSESSMENT — PAIN DESCRIPTION - LOCATION: LOCATION: HEAD

## 2017-11-02 ASSESSMENT — PAIN - FUNCTIONAL ASSESSMENT: PAIN_FUNCTIONAL_ASSESSMENT: 0-10

## 2017-11-02 ASSESSMENT — PAIN DESCRIPTION - DESCRIPTORS: DESCRIPTORS: ACHING

## 2017-11-02 ASSESSMENT — PAIN DESCRIPTION - PAIN TYPE: TYPE: ACUTE PAIN
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
## 2017-11-02 NOTE — H&P
12/1/15    -diverticulosis    TONSILLECTOMY  1960    TOTAL HIP ARTHROPLASTY Right 2003    TUBAL LIGATION  1979    UPPER GASTROINTESTINAL ENDOSCOPY  02/06/2017    -bx,antral erythema       Family History:       Problem Relation Age of Onset    Cancer Mother     Diabetes Mother     High Blood Pressure Mother     Heart Disease Father        Social History:   TOBACCO:   reports that she has never smoked. She has never used smokeless tobacco.  ETOH:   reports that she drinks alcohol. OCCUPATION:none    Allergies:  Lactose intolerance (gi)    Medications Prior to Admission:    Prior to Admission medications    Medication Sig Start Date End Date Taking?  Authorizing Provider   azithromycin (ZITHROMAX) 250 MG tablet Take 1 tablet by mouth daily 11/2/17  Yes Tanner Tubbs MD   diphenoxylate-atropine (DIPHENATOL) 2.5-0.025 MG per tablet Take 1 tablet by mouth 4 times daily as needed for Diarrhea 10/27/17 11/3/17 Yes Kelle Hernandez CNP   hydroxychloroquine (PLAQUENIL) 200 MG tablet take 1 and 1/2 tablets by mouth once daily 9/9/17  Yes Historical Provider, MD   albuterol (PROVENTIL) (2.5 MG/3ML) 0.083% nebulizer solution Take 3 mLs by nebulization every 6 hours as needed for Wheezing Nebulizer treatment 10/13/17  Yes Heather Boateng MD   benzonatate (TESSALON) 100 MG capsule take 1 capsule by mouth three times a day if needed 8/21/17  Yes Historical Provider, MD   furosemide (LASIX) 20 MG tablet Take 1 tablet by mouth daily as needed (EDEMA) 9/6/17  Yes Heather Boateng MD   lactobacillus (CULTURELLE) capsule Take 1 capsule by mouth daily (with breakfast) 8/11/17  Yes Aden Arroyo PA-C   pantoprazole (PROTONIX) 40 MG tablet Take 1 tablet by mouth every morning (before breakfast) 8/11/17  Yes Reza Arroyo PA-C   mometasone-formoterol [facility]) 200-5 MCG/ACT inhaler Inhale 2 puffs into the lungs 2 times daily 8/11/17  Yes Reza Arroyo PA-C   fluticasone (FLONASE) 50 MCG/ACT nasal spray 1 spray by Nasal route daily 5/1/17  Yes Finesse Lee MD   folic acid (FOLVITE) 1 MG tablet take 1 tablet by mouth once daily 3/18/17  Yes Historical Provider, MD   levothyroxine (SYNTHROID) 200 MCG tablet take 1 tablet by mouth once daily 12/28/16  Yes Markell Lopez MD   raloxifene (EVISTA) 60 MG tablet Take 60 mg by mouth daily  10/1/15  Yes Historical Provider, MD   Calcium Carbonate-Vitamin D (OYSTER SHELL CALCIUM/D) 500-200 MG-UNIT TABS 2 tabs daily 10/28/15  Yes Markell Lopez MD   cholestyramine Erleanic[facility] [place]) 4 g packet Take 1 packet by mouth 3 times daily (with meals) for 10 days 10/13/17 10/23/17  Markell Lopez MD       Review of Systems:  Constitutional:positive  for fevers, and negative for chills. Eyes: negative for visual disturbance   ENT: negative for sore throat, negative nasal congestion, and negative for earache  Respiratory: positive for shortness of breath, positive for cough, and positive for wheezing  Cardiovascular: negative for chest pain, negative for palpitations, and negative for syncope  Gastrointestinal: positive for abdominal pain, negative for nausea,negative for vomiting, positive for diarrhea, negative for constipation, and negative for hematochezia or melena  Genitourinary: negative for dysuria, negative for urinary urgency, negative for urinary frequency, and negative for hematuria  Skin: negative for skin rash, and negative for skin lesions  Neurological: negative for unilateral weakness, numbness or tingling.     Physical Exam:    Vitals:   Temp: 98.6 °F (37 °C)  BP: (!) 100/53  Resp: 20  Pulse: 88  SpO2: 93 %  24HR INTAKE/OUTPUT:  No intake or output data in the 24 hours ending 11/02/17 1107    Exam:  GEN:  alert and oriented to person, place and time, well-developed and well-nourished, in no acute distress  EYES: PERRL  NECK: normal, supple,  no carotid bruits  PULM:  bilaterally crackles - no wheezes or rhonchi, no respiratory distress on O2  COR: regular rate & rhythm and no murmurs  ABD:  soft, non-tender, non-distended, normal bowel sounds, no masses or organomegaly  EXT:   no cyanosis, clubbing or edema present    NEURO: follows commands, BALDWIN, no deficits  SKIN:  no rashes or significant lesions  -----------------------------------------------------------------  Diagnostic Data:   Lab Results   Component Value Date    WBC 12.6 (H) 11/02/2017    HGB 12.3 11/02/2017     11/02/2017       Lab Results   Component Value Date    BUN 10 11/02/2017    CREATININE 0.62 11/02/2017     11/02/2017    K 3.7 11/02/2017    CALCIUM 8.8 11/02/2017    CL 95 (L) 11/02/2017    CO2 32 (H) 11/02/2017    LABGLOM >60 11/02/2017       Lab Results   Component Value Date    WBCUA 2 TO 5 11/02/2017    RBCUA 0 TO 2 11/02/2017    EPITHUA 10 TO 20 11/02/2017    LEUKOCYTESUR TRACE (A) 11/02/2017    SPECGRAV 1.025 (H) 11/02/2017    GLUCOSEU NEGATIVE 11/02/2017    KETUA TRACE (A) 11/02/2017    PROTEINU TRACE (A) 11/02/2017    HGBUR NEGATIVE 11/02/2017    CASTUA NOT REPORTED 11/02/2017    CRYSTUA NOT REPORTED 11/02/2017    BACTERIA 1+ (A) 11/02/2017    YEAST NOT REPORTED 11/02/2017       Lab Results   Component Value Date    TROPONINT <0.03 04/08/2017       Xr Chest Standard (2 Vw)    Result Date: 11/2/2017  FINAL REPORT EXAM:  XR CHEST STANDARD TWO VW HISTORY:  fever COMPARISON:  August 5, 2017 FINDINGS[de-identified]  Frontal and lateral views of the chest obtained. Stable mild cardiac enlargement. Prominent dextro convex curvature of the thoracic spine. Persistent chronic appearing bronchial wall thickening and interstitial opacities scattered throughout the lungs more extensive on the right. There may be a new right perihilar opacity. No pneumothorax. Diffuse osteopenia of the thoracic spine. Impression[de-identified]  Diffuse interstitial densities throughout the lungs more prominent on the right similar prior study.  Findings are compatible with chronic interstitial lung disease versus fibrosis. There may be increased density right perihilar region concerning for small focus of superimposed pneumonia.  Electronically Signed By: Ciarra Moseley   on  11/02/2017  01:15        Assessment:    Principal Problem:    Sepsis ([address]  Active Problems:    Hypertension    Hypothyroidism    Depression    Kyphoscoliosis    Chronic interstitial lung disease ([address]    Chronic diarrhea    Chronic hypercapnic respiratory failure     Community acquired pneumonia of right lung    Acute cystitis    Hypomagnesemia    Anxiety    BAYRON (obstructive sleep apnea)      Patient Active Problem List    Diagnosis Date Noted    Moderate malnutrition ([address] 08/07/2017     Priority: Medium     Class: Present on Admission    Community acquired pneumonia of right lung 11/02/2017    Acute cystitis 11/02/2017    Hypomagnesemia 11/02/2017    BAYRON (obstructive sleep apnea) 11/02/2017    Sepsis ([address] 11/02/2017    Anxiety     Chronic hypercapnic respiratory failure  08/11/2017    Pneumonia of right lower lobe due to infectious organism ([address]     Chronic diarrhea     Acute on chronic respiratory failure with hypercapnia ([address]     Sepsis ([address] 08/07/2017    Chronic interstitial lung disease ([address] 06/23/2017    Mild persistent asthma without complication 04/93/9726    Kyphoscoliosis 05/01/2017    Acute respiratory failure with hypoxia ([address] 04/10/2017    CAP (community acquired pneumonia) 04/07/2017    Osteoarthritis     Depression     Epigastric pain 01/13/2017    Weight loss 01/13/2017    Arthritis of left hand / possible rheumatoid versus osteoarthritis 08/09/2016    Hypothyroidism     Hypertension     Shoulder tendinitis Bilateral with some osteoarthritis 04/08/2015       Plan:     · This patient requires inpatient admission because of sepsis, CAP, pulmonary fibrosis, diarrhea, UTI, chronic respiratory failure, BAYRON  · Factors affecting the medical complexity of this patient include O2 dependant, BAYRON, pulmonary fibrosis  · Estimated length of stay is 3-4 days  · General surgery consult for the chronic diarrhea  · immodium  · c diff screen  · IVF  · Pulmonary consult  · WILL DEFER STEROIDS AND FURTHER IMAGING TO PULMONARY   · IV abx  · Nebs  · O2  · BIPAP AT NIGHT  · dulera  · Replace Mg  · High risk medication monitoring: none    CORE MEASURES  DVT prophylaxis: Lovenox  Decubitus ulcer present on admission: No  CODE STATUS: FULL CODE  Nutrition Status: fair   Physical therapy: Yes   Old Charts reviewed: Yes  EKG Reviewed: Yes  Advance Directive Addressed:  Yes    Farhat Baca PA-C  11/2/2017, 11:07 AM
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
## 2017-11-02 NOTE — PROGRESS NOTES
Palliative Care Notes    Reason for Consult:  Chronic disease support--pulmonary fibrosis    Patient Active Problem List   Diagnosis    Shoulder tendinitis Bilateral with some osteoarthritis    Hypertension    Hypothyroidism    Arthritis of left hand / possible rheumatoid versus osteoarthritis    Epigastric pain    Weight loss    CAP (community acquired pneumonia)    Osteoarthritis    Depression    Acute respiratory failure with hypoxia (HCC)    Mild persistent asthma without complication    Kyphoscoliosis    Chronic interstitial lung disease (HCC)    Sepsis ([address]    Moderate malnutrition ([address]    Pneumonia of right lower lobe due to infectious organism ([address]    Chronic diarrhea    Acute on chronic respiratory failure with hypercapnia (HCC)    Chronic hypercapnic respiratory failure     Community acquired pneumonia of right lung    Acute cystitis    Hypomagnesemia    Anxiety    BAYRON (obstructive sleep apnea)    Sepsis ([address]       Advance Directives:  Code status: Full Code  Patient has capacity for medical decisions: yes  Health Care Power of : yes  Living Will: yes        Pain Management:  The patient is not having any pain. Hx. Scoliosis and arthritis. Symptom Management:  Are there any other symptoms that are distressing to the patient or family that needs addressed? Anxiety:  situational                          Dyspnea:  acute dyspnea, chronic dyspnea and non-productive cough                          Fatigue:  exercise intolerance                          Bladder function:  denies problems                          Bowel function:  diarrhea and poor appetite    Other:  none     Spiritual history/needs:   notified: n/a    Palliative Performance Scale:  __x_70%  Ambulation reduced;  Some disease; Can't do normal job or work; intake normal or reduced; can do full self care; LOC full  ___60%  Ambulation reduced; Significant disease; Can't do hobbies/housework; intake normal or reduced; occasional assist; LOC full/confusion  ___50%  Mainly sit/lie; Extensive disease; Can't do any work; Considerable assist; intake normal or reduced; LOC full/confusion  ___40%  Mainly in bed; Extensive disease; Mainly assist; intake normal or reduced; LOC full/confusion   ___30%  Bed Bound; Extensive disease; Total care; intake reduced; LOCfull/confusion  ___20%  Bed Bound; Extensive disease; Total care; intake minimal; Drowsy/coma  ___10%  Bed Bound; Extensive disease; Total care; Mouth care only; Drowsy/coma  ___0       Death    1 year Mortality Risk %:    5.16  [facility] Score: Risk Score: 6.25  30 Day ED Visit Score (high if >= .99): 0.19    Notes:   Angelina Luna is resting in bed during my visit. She was just admitted this AM and is receiving an aerosol tx at this time. Her  is present in the room. He tells me that Angelina Luna had been doing okay after being discharged the hospital recently. She continues to have diarrhea and feels tired. Not as active as usual.  Not eating as well as usual.  Angelina Luna appears moderately dyspneic at rest.  Permitted to rest.  Briefly discussed progressiveness of her pulmonary fibrosis. Has consults for pulmonology and a surgery consult for evaluation of her diarrhea. Encouragement and support given. Palliative Care Plan:  Education/support to family  Education/support to patient  Continue with current plan of care  Code status clarified: Full Code  Palliative Care Goals:  improve or maintain function/quality of life and preserve independence/autonomy/control  Visit focus:  Routine meeting  Discuss goals of care  Elicit patient's goals and values, and use these to establish or modify goals of care     Zahira Garza RN, Sixto Beltrán and Tayler Mckeon Nurse Coordinator  11/2/2017 2:32 PM

## 2017-11-02 NOTE — ED PROVIDER NOTES
This is a 63-year-old female with a history of pulmonary fibrosis secondary to her scoliosis who presents with shortness of breath. Shortness of breath is fairly chronic in nature but she does say it slightly worse. Her primary complaint is her overall feeling of fatigue. She has had diarrhea since June with no definitive diagnosis. She has been checked for C. Diff multiple times and those were each negative. She took 6 doses of Lomotil with a continued to have multiple episodes of watery stool. She denies any significant abdominal pain other than when she is cramping with the diarrhea. She denies any fevers or chills however she is febrile here. She denies any chest      Review of Systems   Constitutional: Positive for activity change, fatigue and fever. HENT: Negative. Negative for sore throat. Eyes: Negative. Respiratory: Positive for chest tightness and shortness of breath. Negative for cough. Cardiovascular: Negative. Gastrointestinal: Negative. Genitourinary: Negative. Musculoskeletal: Negative. Skin: Negative. Neurological: Negative for dizziness and headaches. Hematological: Negative. Physical Exam   Constitutional: She is oriented to person, place, and time and well-developed, well-nourished, and in no distress. HENT:   Head: Normocephalic and atraumatic. Eyes: Conjunctivae and EOM are normal. Pupils are equal, round, and reactive to light. Neck: Normal range of motion. Neck supple. Cardiovascular: Normal rate, regular rhythm, normal heart sounds and intact distal pulses. Pulmonary/Chest: Effort normal. She has rales. Abdominal: Soft. Bowel sounds are normal.   Musculoskeletal: Normal range of motion. She exhibits deformity. Significant scoliosis   Neurological: She is alert and oriented to person, place, and time. GCS score is 15. Skin: Skin is warm and dry.             Codie Larsen MD  11/02/17 4739
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
## 2017-11-02 NOTE — PLAN OF CARE
Problem: Nutrition  Goal: Optimal nutrition therapy  Outcome: Ongoing  Nutrition Problem: Inadequate oral intake (reported)  Intervention: Food and/or Nutrient Delivery: Continue current diet, Start ONS  Nutritional Goals: Po>75% meals with supplement after solids  Ensure Clear 4 oz tid

## 2017-11-02 NOTE — CONSULTS
goiter  CVS: Regular rate and rhythm  Resp: Good bilateral air entry, clear B/L, no active wheezing, no labored breathing  Abd: soft, non-tender, non-distended, bowel sounds present, no guarding, no rebound, no acute findings  Ext: No clubbing, cyanosis, edema  CNS: Moves all extremities, no gross focal motor deficits  Skin: No erythema or ulcerations     Labs:   Lab Results   Component Value Date    WBC 12.6 11/02/2017    HGB 12.3 11/02/2017    HCT 37.3 11/02/2017    MCV 90.7 11/02/2017     11/02/2017     05/18/2012     Lab Results   Component Value Date     11/02/2017    K 3.7 11/02/2017    CL 95 11/02/2017    CO2 32 11/02/2017    BUN 10 11/02/2017    CREATININE 0.62 11/02/2017    GLUCOSE 111 11/02/2017    GLUCOSE 90 10/27/2017    CALCIUM 8.8 11/02/2017     Lab Results   Component Value Date    ALKPHOS 50 11/02/2017    ALT 9 11/02/2017    AST 21 11/02/2017    PROT 7.0 11/02/2017    BILITOT 0.42 11/02/2017    BILIDIR <0.08 11/02/2017    LABALBU 3.2 11/02/2017    LABALBU 4.0 05/18/2012     Lab Results   Component Value Date    LACTA 1.1 11/02/2017       Radiologic Studies:    FINAL REPORT       EXAM:  CT ABDOMEN PELVIS W IV CONTRAST       HISTORY: Theora Banana for 3 weeks        TECHNIQUE:  Spiral multi slice axial acquisition was obtained through the abdomen and pelvis following intravenous contrast administration. Axial, coronal and sagittal images were reconstructed        PRIORS:  None.       FINDINGS:     Interstitial fibrotic changes and emphysematous changes are seen in the lung bases. The liver, spleen, pancreas, adrenal glands and stomach are unremarkable.        The gallbladder is normal in size.       Both kidneys are normal in size. There is no hydronephrosis.         There are no renal calculi or solid renal masses seen.        The ureters are not dilated.        The bowel is not dilated. There is no bowel wall thickening or mesenteric edema seen.       The appendix is not visualized.

## 2017-11-02 NOTE — ED NOTES
Patient/ state they would feel more comfortable staying in the hospital. Dr. Kylee Castellanos notified.       Jason Ang RN  11/02/17 [id number]

## 2017-11-02 NOTE — PROGRESS NOTES
11/02/2017    PROT 7.0 11/02/2017    LABALBU 3.2 (L) 11/02/2017    BILITOT 0.42 11/02/2017    ALKPHOS 50 11/02/2017    AST 21 11/02/2017    ALT 9 11/02/2017    LABGLOM >60 11/02/2017    GFRAA >60 11/02/2017    GLOB NOT REPORTED 11/02/2017     No results found for: LABA1C  No results found for: EAG     Estimated Intake vs Estimated Needs: Insufficient Data    Nutrition Risk Level: Moderate    Taking breakfast meal well thus far upon, but only just beginning breakfast meal.  Denies needs or questions with diet. Ensure Clear was provided to patient, and will order as 4 oz per meal, in place of 8 oz.       Nutrition Interventions:   Continue current diet, Start ONS  Continued Inpatient Monitoring, Coordination of Care, Education not appropriate at this time    Nutrition Evaluation:   · Evaluation: Goals set   · Goals: Po>75% meals with supplement after solids    · Monitoring: Meal Intake, Supplement Intake, Pertinent Labs, Weight    Electronically signed by Tracy Devi RD, LD on 11/2/17 at 9:30 AM    Contact Number: [phone]

## 2017-11-02 NOTE — PROGRESS NOTES
Speech Language Pathology  Facility/Department: [facility] THE [facility] MED SURG   BEDSIDE SWALLOW EVALUATION    NAME: Milton Urena  : 1950  MRN: [id number]    ADMISSION DATE: 2017  ADMITTING DIAGNOSIS: has Shoulder tendinitis Bilateral with some osteoarthritis; Hypertension; Hypothyroidism; Arthritis of left hand / possible rheumatoid versus osteoarthritis; Epigastric pain; Weight loss; CAP (community acquired pneumonia); Osteoarthritis; Depression; Acute respiratory failure with hypoxia ([address] Mild persistent asthma without complication; Kyphoscoliosis; Chronic interstitial lung disease ([address] Sepsis ([address] Moderate malnutrition ([address] Pneumonia of right lower lobe due to infectious organism [facility]); Chronic diarrhea; Acute on chronic respiratory failure with hypercapnia ([address] Chronic hypercapnic respiratory failure ; Community acquired pneumonia of right lung; Acute cystitis; Hypomagnesemia; Anxiety; BAYRON (obstructive sleep apnea); and Sepsis ([address] on her problem list.  ONSET DATE: order for ST on 17    Recent Chest Xray/CT of Chest: (17)  Impression[de-identified]  Diffuse interstitial densities throughout the lungs more prominent on the right similar prior study. Findings are compatible with chronic interstitial lung disease versus fibrosis. There may be increased density right perihilar region    concerning for small focus of superimposed pneumonia. Date of Eval: 2017  Evaluating Therapist: Dayana Workman    Current Diet level:  Current Diet : Regular  Current Liquid Diet : Thin      Primary Complaint  Patient Complaint: Patient denies swallowing trouble.      Pain:  Pain Assessment  Patient Currently in Pain: Denies  Pain Assessment: 0-10  Pain Level: 0    Reason for Referral  Milton Urena was referred for a bedside swallow evaluation to assess the efficiency of her swallow function, rule out aspiration and make recommendations regarding safe dietary consistencies, effective compensatory strategies, and safe eating environment. Impression  Dysphagia Diagnosis: Swallow function appears grossly intact  Dysphagia Outcome Severity Scale: Level 7: Normal in all situations     Treatment Plan  Requires SLP Intervention: No  Duration/Frequency of Treatment: skilled ST services for dysphagia are not warranted at this time. D/C Recommendations: To be determined       Recommended Diet and Intervention  Diet Solids Recommendation: Regular  Liquid Consistency Recommendation: Thin  Recommended Form of Meds: PO     General  Chart Reviewed: Yes  Behavior/Cognition: Alert; Cooperative;Pleasant mood  Respiratory Status: O2 via nasual cannula  O2 Device: Nasal cannula  Communication Observation: Functional  Dentition: Adequate  Patient Positioning: Upright in chair  Baseline Vocal Quality: Normal  Volitional Cough: Strong  Consistencies Trialed: Reg solid; Thin      Pain Level: 0    Vision/Hearing  Vision  Vision: Within Functional Limits  Hearing  Hearing: Within functional limits    Oral Motor Deficits  Oral/Motor  Oral Motor:  Within functional limits    Oral Phase Dysfunction  Oral Phase  Oral Phase: WFL     Indicators of Pharyngeal Phase Dysfunction   Pharyngeal Phase  Pharyngeal Phase: WFL    Prognosis  Prognosis  Prognosis for safe diet advancement: excellent  Individuals consulted  Consulted and agree with results and recommendations: Patient;RN    Education  Patient Education: Patient education completed re: rationale for BSE  Patient Education Response: Verbalizes understanding      G-Code  SLP G-Codes  Functional Assessment Tool Used: BHUMIKA Functional Communication Measures for swallowing   Functional Limitations: Swallowing  Swallow Current Status (): 0 percent impaired, limited or restricted  Swallow Goal Status (): 0 percent impaired, limited or restricted  Swallow Discharge Status (): 0 percent impaired, limited or restricted         Therapy Time  SLP Individual Minutes  Time In: 1510  Time Out: 1520  Minutes: 10      RN did not have concerns re: Patient's swallowing abilities. Patient denies any swallowing trouble at time of BSE. Patient exhibited a dry cough prior to BSE as Respiratory therapy completed directly prior to initial evaluation. Patient tolerated regular solids and thin liquids without s/s oral or pharyngeal dysphagia. Patient did not exhibit overt s/s penetration/aspiration. Continue with general diet - skilled ST services for dysphagia are not warranted at this time secondary to Patient exhibiting a grossly WFL swallow mechanism. French Valencia M.A. ,CCC-SLP  11/2/2017 3:23 PM

## 2017-11-03 ENCOUNTER — APPOINTMENT (OUTPATIENT)
Dept: GENERAL RADIOLOGY | Age: 67
DRG: 871 | End: 2017-11-03
Payer: MEDICARE

## 2017-11-03 LAB
ABSOLUTE EOS #: 0.2 K/UL (ref 0–0.4)
ABSOLUTE IMMATURE GRANULOCYTE: ABNORMAL K/UL (ref 0–0.3)
ABSOLUTE LYMPH #: 1.2 K/UL (ref 1–4.8)
ABSOLUTE MONO #: 0.7 K/UL (ref 0–1)
ANION GAP SERPL CALCULATED.3IONS-SCNC: 12 MMOL/L (ref 9–17)
BASOPHILS # BLD: 0 %
BASOPHILS ABSOLUTE: 0 K/UL (ref 0–0.2)
BUN BLDV-MCNC: 8 MG/DL (ref 8–23)
BUN/CREAT BLD: 14 (ref 9–20)
C DIFFICILE TOXINS, PCR: NORMAL
CALCIUM SERPL-MCNC: 7.9 MG/DL (ref 8.6–10.4)
CHLORIDE BLD-SCNC: 102 MMOL/L (ref 98–107)
CO2: 26 MMOL/L (ref 20–31)
CREAT SERPL-MCNC: 0.58 MG/DL (ref 0.5–0.9)
DIFFERENTIAL TYPE: ABNORMAL
DIRECT EXAM: NORMAL
DIRECT EXAM: NORMAL
EOSINOPHILS RELATIVE PERCENT: 3 %
GFR AFRICAN AMERICAN: >60 ML/MIN
GFR NON-AFRICAN AMERICAN: >60 ML/MIN
GFR SERPL CREATININE-BSD FRML MDRD: ABNORMAL ML/MIN/{1.73_M2}
GFR SERPL CREATININE-BSD FRML MDRD: ABNORMAL ML/MIN/{1.73_M2}
GLUCOSE BLD-MCNC: 96 MG/DL (ref 70–99)
HCT VFR BLD CALC: 32.1 % (ref 36–46)
HEMOGLOBIN: 10.3 G/DL (ref 12–16)
IMMATURE GRANULOCYTES: ABNORMAL %
LACTOFERRIN, QUAL: POSITIVE
LYMPHOCYTES # BLD: 14 %
Lab: NORMAL
MCH RBC QN AUTO: 29.9 PG (ref 26–34)
MCHC RBC AUTO-ENTMCNC: 32.2 G/DL (ref 31–37)
MCV RBC AUTO: 93.1 FL (ref 80–100)
MONOCYTES # BLD: 9 %
PDW BLD-RTO: 13.4 % (ref 12.1–15.2)
PLATELET # BLD: 208 K/UL (ref 140–450)
PLATELET ESTIMATE: ABNORMAL
PMV BLD AUTO: 8.1 FL (ref 6–12)
POTASSIUM SERPL-SCNC: 3.6 MMOL/L (ref 3.7–5.3)
RBC # BLD: 3.44 M/UL (ref 4–5.2)
RBC # BLD: ABNORMAL 10*6/UL
SEG NEUTROPHILS: 74 %
SEGMENTED NEUTROPHILS ABSOLUTE COUNT: 6.1 K/UL (ref 1.8–7.7)
SODIUM BLD-SCNC: 140 MMOL/L (ref 135–144)
SPECIMEN DESCRIPTION: NORMAL
STATUS: NORMAL
WBC # BLD: 8.2 K/UL (ref 3.5–11)
WBC # BLD: ABNORMAL 10*3/UL

## 2017-11-03 PROCEDURE — 1200000000 HC SEMI PRIVATE

## 2017-11-03 PROCEDURE — 36415 COLL VENOUS BLD VENIPUNCTURE: CPT

## 2017-11-03 PROCEDURE — 6360000002 HC RX W HCPCS: Performed by: INTERNAL MEDICINE

## 2017-11-03 PROCEDURE — 2580000003 HC RX 258: Performed by: INTERNAL MEDICINE

## 2017-11-03 PROCEDURE — 85025 COMPLETE CBC W/AUTO DIFF WBC: CPT

## 2017-11-03 PROCEDURE — 71020 XR CHEST STANDARD TWO VW: CPT

## 2017-11-03 PROCEDURE — 6370000000 HC RX 637 (ALT 250 FOR IP): Performed by: INTERNAL MEDICINE

## 2017-11-03 PROCEDURE — 87329 GIARDIA AG IA: CPT

## 2017-11-03 PROCEDURE — 87328 CRYPTOSPORIDIUM AG IA: CPT

## 2017-11-03 PROCEDURE — 94664 DEMO&/EVAL PT USE INHALER: CPT

## 2017-11-03 PROCEDURE — 83630 LACTOFERRIN FECAL (QUAL): CPT

## 2017-11-03 PROCEDURE — 80048 BASIC METABOLIC PNL TOTAL CA: CPT

## 2017-11-03 PROCEDURE — 2580000003 HC RX 258: Performed by: PHYSICIAN ASSISTANT

## 2017-11-03 PROCEDURE — 87493 C DIFF AMPLIFIED PROBE: CPT

## 2017-11-03 PROCEDURE — 94640 AIRWAY INHALATION TREATMENT: CPT

## 2017-11-03 RX ORDER — PROMETHAZINE HYDROCHLORIDE 25 MG/ML
25 INJECTION, SOLUTION INTRAMUSCULAR; INTRAVENOUS ONCE
Status: COMPLETED | OUTPATIENT
Start: 2017-11-03 | End: 2017-11-03

## 2017-11-03 RX ADMIN — ACETAMINOPHEN 650 MG: 325 TABLET, FILM COATED ORAL at 05:51

## 2017-11-03 RX ADMIN — DIPHENOXYLATE HYDROCHLORIDE AND ATROPINE SULFATE 1 TABLET: 2.5; .025 TABLET ORAL at 21:16

## 2017-11-03 RX ADMIN — HYDROXYCHLOROQUINE SULFATE 200 MG: 200 TABLET ORAL at 21:05

## 2017-11-03 RX ADMIN — FAMOTIDINE 20 MG: 20 TABLET ORAL at 21:05

## 2017-11-03 RX ADMIN — BENZONATATE 100 MG: 100 CAPSULE ORAL at 21:16

## 2017-11-03 RX ADMIN — RALOXIFENE HYDROCHLORIDE 60 MG: 60 TABLET, FILM COATED ORAL at 08:34

## 2017-11-03 RX ADMIN — LEVOTHYROXINE SODIUM 200 MCG: 100 TABLET ORAL at 07:04

## 2017-11-03 RX ADMIN — CEFTRIAXONE 1 G: 1 INJECTION, POWDER, FOR SOLUTION INTRAMUSCULAR; INTRAVENOUS at 08:34

## 2017-11-03 RX ADMIN — DIPHENOXYLATE HYDROCHLORIDE AND ATROPINE SULFATE 1 TABLET: 2.5; .025 TABLET ORAL at 14:29

## 2017-11-03 RX ADMIN — MOMETASONE FUROATE AND FORMOTEROL FUMARATE DIHYDRATE 2 PUFF: 200; 5 AEROSOL RESPIRATORY (INHALATION) at 09:40

## 2017-11-03 RX ADMIN — FLUTICASONE PROPIONATE 1 SPRAY: 50 SPRAY, METERED NASAL at 08:34

## 2017-11-03 RX ADMIN — Medication 10 ML: at 17:45

## 2017-11-03 RX ADMIN — SODIUM CHLORIDE: 9 INJECTION, SOLUTION INTRAVENOUS at 03:05

## 2017-11-03 RX ADMIN — ACETAMINOPHEN 650 MG: 325 TABLET, FILM COATED ORAL at 21:05

## 2017-11-03 RX ADMIN — MOMETASONE FUROATE AND FORMOTEROL FUMARATE DIHYDRATE 2 PUFF: 200; 5 AEROSOL RESPIRATORY (INHALATION) at 22:10

## 2017-11-03 RX ADMIN — ONDANSETRON 4 MG: 2 INJECTION INTRAMUSCULAR; INTRAVENOUS at 10:23

## 2017-11-03 RX ADMIN — Medication 10 ML: at 21:05

## 2017-11-03 RX ADMIN — AZITHROMYCIN MONOHYDRATE 500 MG: 500 INJECTION, POWDER, LYOPHILIZED, FOR SOLUTION INTRAVENOUS at 08:37

## 2017-11-03 RX ADMIN — PANTOPRAZOLE SODIUM 40 MG: 40 TABLET, DELAYED RELEASE ORAL at 07:04

## 2017-11-03 RX ADMIN — DIPHENOXYLATE HYDROCHLORIDE AND ATROPINE SULFATE 1 TABLET: 2.5; .025 TABLET ORAL at 08:33

## 2017-11-03 RX ADMIN — BENZONATATE 100 MG: 100 CAPSULE ORAL at 14:29

## 2017-11-03 RX ADMIN — ONDANSETRON 4 MG: 2 INJECTION INTRAMUSCULAR; INTRAVENOUS at 16:18

## 2017-11-03 RX ADMIN — Medication 10 ML: at 16:18

## 2017-11-03 RX ADMIN — HYDROXYCHLOROQUINE SULFATE 200 MG: 200 TABLET ORAL at 08:33

## 2017-11-03 RX ADMIN — ENOXAPARIN SODIUM 40 MG: 40 INJECTION SUBCUTANEOUS at 08:33

## 2017-11-03 RX ADMIN — PROMETHAZINE HYDROCHLORIDE 25 MG: 25 INJECTION INTRAMUSCULAR; INTRAVENOUS at 17:45

## 2017-11-03 RX ADMIN — FAMOTIDINE 20 MG: 20 TABLET ORAL at 08:33

## 2017-11-03 ASSESSMENT — PAIN SCALES - GENERAL
PAINLEVEL_OUTOF10: 0
PAINLEVEL_OUTOF10: 5

## 2017-11-03 NOTE — PROGRESS NOTES
bases  [x]  Diminish-ed t/o, or rales   []  Sporadic, scattered wheezes or rhonchi []  Persistentwheezes and, or absent BBS 2   Cough []  Strong, effective, & non-prod. [x]  Effective & prod. Less than 25 ml (2 TBSP) over past 24 hrs []  Ineffective & non-prod to less than 25 ML over past 24 hrs []  Ineffective and, or greater than 25 ml sputum prod. past 24 hrs. []  Nonspon- taneous; Requires suctioning 1   Pulmonary History  (PULM HX) []  No smoking and no chronic pulmonaryhistory []  Former smoker. Quit over 12 mos. ago []  Current smoker or quit w/ in 12 mos []  Pulm. History and, or 20 pk/yr smoking hx [x]  Admitted w/ acute pulm. dx and, or has been admitted w/ pulm. dx 2 or more times over past 12 mos 4   Surgical History this Admit  (SURG HX) [x]  No surgery []  General surgery []  Lower abdominal []  Thoracic or upper abdominal   []  Thoracic w/ pulm. disease 0   Chest X-Ray (CXR)/CT Scan []  Clear or not applicable []  Not available []  Atelect- asis or pleural effusions [x]  Localized infiltrate or pulm. edema []  Con-solidated Infiltrates, bilateral, or in more than 1 lobe 3   Slow or Forced VC, FEV1 OR PEFR (PULM FXN)  [x]  80% or greater, or not indicated []  Pt. unable to perform []  FEV1 or PEFR or VC 51-79%.   []  FEV1 or PEFR or VC  30-49%   []  FEV1 or PEFR or VC less than 30%   0   TOTAL ACUITY: 12       CARE PLAN    If Acuity Level is 2, 3, or 4 in any of the following:    [] BILATERAL BREATH SOUNDS (BBS)     [x] PULMONARY HISTORY (PULM HX)  [] PULMONARY FUNCTION (PULM FX)    Goal: Improve respiratory functions in patients with airway disease and decrease WOB    [x] AEROSOL PROTOCOL    Total Acuity:   16-32  []  Secondary Assessment in 24 hrs Total Acuity:  9-15  [x]  Secondary Assessment in 24 hrs Total Acuity:  4-8  []  Secondary Assessment in 48 hrs Total Acuity:  0-3  []  Secondary Assessment in 72 hrs   HHN AEROSOL THERAPY with  [physician-ordered bronchodilator(s)] q 4 & Albuterol PRN q2 hrs.   Breath-Actuated Neb if BBS Acuity = 4, and pt. can use MP. Notify physician if condition deteriorates. HHN AEROSOL THERAPY with  [physician-ordered bronchodilator(s)]  QID and Albuterol PRN q4 hrs. Breath-Actuated Neb if BBS Acuity = 4, and pt. can use MP. Notify physician if condition deteriorates. MDI THERAPY with  2 actuations of [physician-ordered bronchodilator(s)] via spacer TID Albuterol and PRNq4 hrs. If unable to utilize MDI: HHN [physician-ordered bronchodilator(s)] TID and Albuterol PRN q4 hrs. Notify physician if condition deteriorates. MDI THERAPY with  [physician-ordered bronchodilator(s)] via spacer TID PRN. If unable to utilize MDI: HHN [physician-ordered bronchodilator(s)] TID PRN. Notify physician if condition deteriorates. If Acuity Level is 2, 3, or 4 in any of the following:    [] COUGH     [] SURGICAL HISTORY (SURG HX)  [x] CHEST XRAY (CXR)    Goal: Improvement in sputum mobilization in patients with ineffective airway clearance. Reverse atelectasis. [x] Bronchopulmonary Hygiene Protocol    Total Acuity:   16-32  []  Secondary Assessment in 24 hrs Total Acuity:  9-15  [x]  Secondary Assessment in 24 hrs Total Acuity:  4-8  []  Secondary Assessment in 48 hrs Total Acuity:  0-3  []  Secondary Assessment in 72 hrs   METANEB QID with [physician-ordered bronchodilator(s)] if CXR Acuity = 4; otherwise:  PD&P, PEP, or Vest QID & PRN  NT Sxn PRN for ineffective cough  METANEB QID with [physician-ordered bronchodilator(s)] if CXR Acuity = 4; otherwise:  PD&P, PEP, or Vest TID & PRN  NT Sxn PRN for ineffective cough  Instruct patient to self-perform IS q1hr WA   Directed Cough self-performed q1hr WA     If Acuity Level is 2 or above in the following:    [] PULMONARY HISTORY (PULM HX)    Goal: Assist patient in quitting smoking to slow or stop the progression of lung disease.     [] Smoking Cessation Protocol    SMOKING CESSATION EDUCATION provided according to policy GK_704:

## 2017-11-03 NOTE — PLAN OF CARE
Problem: Nutrition  Goal: Optimal nutrition therapy  Outcome: Ongoing  Nutritional supplements with meals. Monitor meal intake    Problem: Discharge Planning:  Goal: Discharged to appropriate level of care  Discharged to appropriate level of care   Outcome: Ongoing  Patient verbalizes understanding of care plan and discharge instructions. Denies anticipated discharge needs. Problem: Airway Clearance - Ineffective:  Goal: Ability to maintain a clear airway will improve  Ability to maintain a clear airway will improve   Outcome: Ongoing  Monitor pulse ox Q shift and prn    Problem: Fluid Volume - Deficit:  Goal: Achieves intake and output within specified parameters  Achieves intake and output within specified parameters   Outcome: Ongoing  Accurate I/O, daily weights    Problem: Hyperthermia:  Goal: Ability to maintain a body temperature in the normal range will improve  Ability to maintain a body temperature in the normal range will improve   Outcome: Ongoing  Monitor temperature Q shift and prn    Problem: Musculor/Skeletal Functional Status  Goal: Highest potential functional level  Outcome: Ongoing  PT/OT for strengthening    Problem: Pain:  Goal: Pain level will decrease  Pain level will decrease   Outcome: Ongoing  Continuing to monitor/assess pain at least every 4 hours. Informed patient of adverse complications of uncontrolled pain. Verbalized understanding and agrees to report increases in pain level. Educated that pain medication addiction risk is greatly reduced when medication is used for acute pain on a short term basis. Plan patient's day so activities occur at the peak level of medication. Will be medicated before procedures and activities that increase pain so to prevent uncontrollable pain. Will provide distractions such as TV, music, reading, and/or visitors. Will inform physician of ineffective pain control. Will continue to monitor and medicate as ordered.

## 2017-11-03 NOTE — PROGRESS NOTES
Nutrition Assessment    Type and Reason for Visit: Reassess    Nutrition Recommendations:   1. Continue with current diet. 2. Continue with current supplements. Malnutrition Assessment:  · Malnutrition Status: At risk for malnutrition  · Context: Acute illness or injury  · Findings of the 6 clinical characteristics of malnutrition (Minimum of 2 out of 6 clinical characteristics is required to make the diagnosis of moderate or severe Protein Calorie Malnutrition based on AND/ASPEN Guidelines):  1. Energy Intake-Less than or equal to 75%, not able to assess (acutely)    2. Weight Loss-No significant weight loss,    3. Fat Loss-No significant subcutaneous fat loss,    4. Muscle Loss-No significant muscle mass loss,    5. Fluid Accumulation-Mild fluid accumulation, Extremities (+ 2 edema R/L LE)  6.  Strength-Not measured    Nutrition Diagnosis:   · Problem: Inadequate oral intake (reported)  · Etiology: related to Alteration in GI function     Signs and symptoms:  as evidenced by Patient report of (vomiting after meals )    Nutrition Assessment:  · Subjective Assessment: Pt states she is so-so. She had Nausea today. States she ate 1/2 of her chicken for lunch.    · Nutrition-Focused Physical Findings:    · Wound Type: None  · Current Nutrition Therapies:  · Oral Diet Orders: General, 2gm Sodium   · Oral Diet intake: 26-50%, Select  · Oral Nutrition Supplement (ONS) Orders: Clear Liquid Oral Supplement  · ONS intake: Unable to assess  · Anthropometric Measures:  · Ht: 5' 2\" (157.5 cm)   · Current Body Wt: 150 lb 3.2 oz (68.1 kg)  · Admission Body Wt: 145 lb (65.8 kg)  · Usual Body Wt: 153 lb (69.4 kg)  · % Weight Change: 4% ,  3 months (improved in last month)  · Ideal Body Wt: 110 lb (49.9 kg),   · BMI Classification: BMI 25.0 - 29.9 Overweight  Wt Readings from Last 3 Encounters:   11/03/17 150 lb 3.2 oz (68.1 kg)   10/27/17 145 lb (65.8 kg)   10/13/17 143 lb (64.9 kg)     Recent Labs      11/02/17

## 2017-11-03 NOTE — FLOWSHEET NOTE
Phenergan 25 mg IVP given for c/o nausea and emesis. Patient and her  informed that the medication may make her drowsy. Informed to ask for help getting up to the bathroom. Voices understanding.

## 2017-11-03 NOTE — FLOWSHEET NOTE
Tessalon po given for c/o cough. Lomotil po given for having x5 loose stools since 7 am. Humidification added to O2 for c/o a  dry nose.

## 2017-11-03 NOTE — PROGRESS NOTES
Bere Barber M.D. Internal Medicine Progress Note 11/3/17    SUBJECTIVE:    Patient seen for f/u of pneumonia and Sepsis ([address] She is still coughing and short of breath. No fevers or chills. Slept OK overnight. ROS:   Constitutional: negative  for fevers, and negative for chills. Respiratory: negative for shortness of breath, negative for cough, and negative for wheezing  Cardiovascular: negative for chest pain, and negative for palpitations  Gastrointestinal: negative for abdominal pain, negative for nausea,negative for vomiting, negative for diarrhea, and negative for constipation     All other systems were reviewed with the patient and are negative unless otherwise stated in HPI    OBJECTIVE:  Vitals:   Temp: 99.4 °F (37.4 °C)  BP: (!) 98/54  Resp: 20  Pulse: 96  SpO2: 92 %    24HR INTAKE/OUTPUT:    Intake/Output Summary (Last 24 hours) at 11/03/17 0742  Last data filed at 11/03/17 0434   Gross per 24 hour   Intake             3183 ml   Output                0 ml   Net             3183 ml     -----------------------------------------------------------------  Exam:  GEN:    Awake, alert and oriented x 3. no acute distress  EYES:  EOMI, pupils equal   NECK: Supple. No lymphadenopathy. No carotid bruit  CVS:    RRR, no murmur, rub or gallop  PULM:  CTA, no wheezes, rales or rhonchi  ABD:    Bowels sounds normal.  Abdomen is soft. No distention. No tenderness. EXT:   no edema bilaterally . No calf tenderness. NEURO: Motor and sensory are intact  SKIN:  No rashes.   No skin lesions.    -----------------------------------------------------------------  Diagnostic Data:    · All available data reviewed  Lab Results   Component Value Date    WBC 8.2 11/03/2017    HGB 10.3 (L) 11/03/2017    MCV 93.1 11/03/2017     11/03/2017    Lab Results   Component Value Date    GLUCOSE 96 11/03/2017    BUN 8 11/03/2017    CREATININE 0.58 11/03/2017     11/03/2017    K 3.6 (L) 11/03/2017    CALCIUM 7.9 (L) 11/03/2017     11/03/2017    CO2 26 11/03/2017       ASSESSMENT:    Principal Problem:    Sepsis due to pneumonia     Acute on chronic respiratory failure with hypoxia and increased work of breathing     Active Problems:    Chronic diarrhea x 3 months    Community acquired pneumonia of right lung    Chronic interstitial lung disease     Chronic hypercapnic respiratory failure     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    Chronic cor pulmonale     Pulmonary hypertension due to hypoxia    IPF (idiopathic pulmonary fibrosis)     BAYRON (obstructive sleep apnea) on BiPAP    Hypertension    Hypothyroidism    Depression    Kyphoscoliosis    Hypomagnesemia    Anxiety      PLAN:  · Continue current therapy  · Appreciate Dr. Hannah Balderrama assistance - continue current Tx  · Appriciate Dr. Ruth delvalle's outpatient colonoscopy with Dr. Rach Shah once pneumonia resolved  · Continue IV Abx  · Continue supplemental 02 and nocturnal BiPAP  · Repeat CXR in AM  · Hopefully DC tomorrow or Sunday    Selin Schilling M.D.  11/3/2017  7:42 AM

## 2017-11-03 NOTE — FLOWSHEET NOTE
Dr Kelly Larkin made aware of patients c/o increased nausea, had small emesis. Too soon for Zofran. Orders received.

## 2017-11-03 NOTE — PATIENT CARE CONFERENCE
Javier Hernandez was admitted on 11/1/2017 11:24 PM with Pneumonia [J18.9]    Quality Flow Rounds held. Currently resting - meeting held outside room  Nurse reports - N/V and not feeling well today - Zofran administered  Flu vaccine scheduled for tomorrow  Lives with spouse, has home O2 and Bipap    Possible Home health needed?

## 2017-11-03 NOTE — PLAN OF CARE
Problem: Airway Clearance - Ineffective:  Goal: Clear lung sounds  Clear lung sounds   Outcome: Ongoing  Lungs have fine crackles, will continue to monitor. Problem: Fluid Volume - Deficit:  Goal: Achieves intake and output within specified parameters  Achieves intake and output within specified parameters   Outcome: Ongoing  Monitoring I/O, IVF infusing as ordered, will continue to monitor. Problem: Hyperthermia:  Goal: Ability to maintain a body temperature in the normal range will improve  Ability to maintain a body temperature in the normal range will improve   Outcome: Ongoing  Temperature WNL, will continue to monitor. Problem: Pain:  Goal: Pain level will decrease  Pain level will decrease   Outcome: Ongoing  Pt is able to verbalize when in pain. Pt denies pain at this time. Will continue to monitor.

## 2017-11-04 LAB
ABSOLUTE EOS #: 0.1 K/UL (ref 0–0.4)
ABSOLUTE IMMATURE GRANULOCYTE: ABNORMAL K/UL (ref 0–0.3)
ABSOLUTE LYMPH #: 1.2 K/UL (ref 1–4.8)
ABSOLUTE MONO #: 0.9 K/UL (ref 0–1)
ANION GAP SERPL CALCULATED.3IONS-SCNC: 12 MMOL/L (ref 9–17)
BASOPHILS # BLD: 0 %
BASOPHILS ABSOLUTE: 0 K/UL (ref 0–0.2)
BUN BLDV-MCNC: 9 MG/DL (ref 8–23)
BUN/CREAT BLD: 13 (ref 9–20)
CALCIUM SERPL-MCNC: 8.6 MG/DL (ref 8.6–10.4)
CHLORIDE BLD-SCNC: 99 MMOL/L (ref 98–107)
CO2: 26 MMOL/L (ref 20–31)
CREAT SERPL-MCNC: 0.72 MG/DL (ref 0.5–0.9)
DIFFERENTIAL TYPE: ABNORMAL
EOSINOPHILS RELATIVE PERCENT: 2 %
GFR AFRICAN AMERICAN: >60 ML/MIN
GFR NON-AFRICAN AMERICAN: >60 ML/MIN
GFR SERPL CREATININE-BSD FRML MDRD: ABNORMAL ML/MIN/{1.73_M2}
GFR SERPL CREATININE-BSD FRML MDRD: ABNORMAL ML/MIN/{1.73_M2}
GLUCOSE BLD-MCNC: 118 MG/DL (ref 70–99)
HCT VFR BLD CALC: 33.7 % (ref 36–46)
HEMOGLOBIN: 10.8 G/DL (ref 12–16)
IMMATURE GRANULOCYTES: ABNORMAL %
LYMPHOCYTES # BLD: 17 %
MCH RBC QN AUTO: 29.9 PG (ref 26–34)
MCHC RBC AUTO-ENTMCNC: 32 G/DL (ref 31–37)
MCV RBC AUTO: 93.5 FL (ref 80–100)
MONOCYTES # BLD: 12 %
PDW BLD-RTO: 13.4 % (ref 12.1–15.2)
PLATELET # BLD: 203 K/UL (ref 140–450)
PLATELET ESTIMATE: ABNORMAL
PMV BLD AUTO: 8.5 FL (ref 6–12)
POTASSIUM SERPL-SCNC: 4.1 MMOL/L (ref 3.7–5.3)
RBC # BLD: 3.61 M/UL (ref 4–5.2)
RBC # BLD: ABNORMAL 10*6/UL
SEG NEUTROPHILS: 69 %
SEGMENTED NEUTROPHILS ABSOLUTE COUNT: 5 K/UL (ref 1.8–7.7)
SODIUM BLD-SCNC: 137 MMOL/L (ref 135–144)
WBC # BLD: 7.2 K/UL (ref 3.5–11)
WBC # BLD: ABNORMAL 10*3/UL

## 2017-11-04 PROCEDURE — 94664 DEMO&/EVAL PT USE INHALER: CPT

## 2017-11-04 PROCEDURE — 6360000002 HC RX W HCPCS: Performed by: INTERNAL MEDICINE

## 2017-11-04 PROCEDURE — 80048 BASIC METABOLIC PNL TOTAL CA: CPT

## 2017-11-04 PROCEDURE — 36415 COLL VENOUS BLD VENIPUNCTURE: CPT

## 2017-11-04 PROCEDURE — 94761 N-INVAS EAR/PLS OXIMETRY MLT: CPT

## 2017-11-04 PROCEDURE — 94640 AIRWAY INHALATION TREATMENT: CPT

## 2017-11-04 PROCEDURE — 85025 COMPLETE CBC W/AUTO DIFF WBC: CPT

## 2017-11-04 PROCEDURE — 6370000000 HC RX 637 (ALT 250 FOR IP): Performed by: INTERNAL MEDICINE

## 2017-11-04 PROCEDURE — 94668 MNPJ CHEST WALL SBSQ: CPT

## 2017-11-04 PROCEDURE — 1200000000 HC SEMI PRIVATE

## 2017-11-04 PROCEDURE — 2580000003 HC RX 258: Performed by: INTERNAL MEDICINE

## 2017-11-04 RX ORDER — ALBUTEROL SULFATE 2.5 MG/3ML
2.5 SOLUTION RESPIRATORY (INHALATION) EVERY 4 HOURS PRN
Status: DISCONTINUED | OUTPATIENT
Start: 2017-11-04 | End: 2017-11-07 | Stop reason: HOSPADM

## 2017-11-04 RX ORDER — ALBUTEROL SULFATE 2.5 MG/3ML
2.5 SOLUTION RESPIRATORY (INHALATION) 4 TIMES DAILY
Status: DISCONTINUED | OUTPATIENT
Start: 2017-11-04 | End: 2017-11-07 | Stop reason: HOSPADM

## 2017-11-04 RX ADMIN — Medication 10 ML: at 20:16

## 2017-11-04 RX ADMIN — ENOXAPARIN SODIUM 40 MG: 40 INJECTION SUBCUTANEOUS at 08:31

## 2017-11-04 RX ADMIN — LEVOTHYROXINE SODIUM 200 MCG: 100 TABLET ORAL at 07:38

## 2017-11-04 RX ADMIN — ALBUTEROL SULFATE 2.5 MG: 2.5 SOLUTION RESPIRATORY (INHALATION) at 16:49

## 2017-11-04 RX ADMIN — FUROSEMIDE 20 MG: 20 TABLET ORAL at 07:38

## 2017-11-04 RX ADMIN — BENZONATATE 100 MG: 100 CAPSULE ORAL at 20:16

## 2017-11-04 RX ADMIN — HYDROXYCHLOROQUINE SULFATE 200 MG: 200 TABLET ORAL at 20:16

## 2017-11-04 RX ADMIN — FAMOTIDINE 20 MG: 20 TABLET ORAL at 08:31

## 2017-11-04 RX ADMIN — RALOXIFENE HYDROCHLORIDE 60 MG: 60 TABLET, FILM COATED ORAL at 08:32

## 2017-11-04 RX ADMIN — HYDROXYCHLOROQUINE SULFATE 200 MG: 200 TABLET ORAL at 08:31

## 2017-11-04 RX ADMIN — ALBUTEROL SULFATE 2.5 MG: 2.5 SOLUTION RESPIRATORY (INHALATION) at 11:42

## 2017-11-04 RX ADMIN — BENZONATATE 100 MG: 100 CAPSULE ORAL at 08:34

## 2017-11-04 RX ADMIN — Medication 10 ML: at 08:32

## 2017-11-04 RX ADMIN — PANTOPRAZOLE SODIUM 40 MG: 40 TABLET, DELAYED RELEASE ORAL at 07:38

## 2017-11-04 RX ADMIN — ONDANSETRON 4 MG: 2 INJECTION INTRAMUSCULAR; INTRAVENOUS at 11:15

## 2017-11-04 RX ADMIN — AZITHROMYCIN MONOHYDRATE 500 MG: 500 INJECTION, POWDER, LYOPHILIZED, FOR SOLUTION INTRAVENOUS at 08:31

## 2017-11-04 RX ADMIN — FLUTICASONE PROPIONATE 1 SPRAY: 50 SPRAY, METERED NASAL at 08:31

## 2017-11-04 RX ADMIN — Medication 10 ML: at 11:27

## 2017-11-04 RX ADMIN — CEFTRIAXONE 1 G: 1 INJECTION, POWDER, FOR SOLUTION INTRAMUSCULAR; INTRAVENOUS at 11:26

## 2017-11-04 RX ADMIN — ALBUTEROL SULFATE 2.5 MG: 2.5 SOLUTION RESPIRATORY (INHALATION) at 07:25

## 2017-11-04 RX ADMIN — MOMETASONE FUROATE AND FORMOTEROL FUMARATE DIHYDRATE 2 PUFF: 200; 5 AEROSOL RESPIRATORY (INHALATION) at 11:42

## 2017-11-04 RX ADMIN — FAMOTIDINE 20 MG: 20 TABLET ORAL at 20:16

## 2017-11-04 NOTE — PLAN OF CARE
Problem: Nutrition  Goal: Optimal nutrition therapy  Outcome: Ongoing  Some nausea reported after eating breakfast, received Zofran. Problem: Discharge Planning:  Goal: Discharged to appropriate level of care  Discharged to appropriate level of care   Outcome: Ongoing  Pt plans to return home upon discharge. Problem: Airway Clearance - Ineffective:  Goal: Clear lung sounds  Clear lung sounds   Outcome: Ongoing  Fine crackles noted upon auscultation this Am, PRN PO lasix given this morning. Problem: Fluid Volume - Deficit:  Goal: Achieves intake and output within specified parameters  Achieves intake and output within specified parameters   Outcome: Ongoing  Pt IV locked, oral fluids encouraged. Problem: Hyperthermia:  Goal: Ability to maintain a body temperature in the normal range will improve  Ability to maintain a body temperature in the normal range will improve   Outcome: Ongoing  Temperature within normal limits thus far this shift, did have a fever last shift. Will continue to monitor. Problem: Pain:  Goal: Pain level will decrease  Pain level will decrease   Outcome: Ongoing  Pain assessed every four hours and as needed using 0-10 pain scale. Pt educated on scale and uses scale appropriately. Encourage pt to notify staff of pain before pain becomes uncontrollable. Correlate periods of heavy activity after pain medication administration.  Use pharmacological and non pharmacological methods for pain relief such as: warm blankets, ice, television, reading, or rest.

## 2017-11-04 NOTE — PROGRESS NOTES
increased density right perihilar region concerning for small focus of superimposed pneumonia.  Electronically Signed By: Yessy Palm   on  11/02/2017  01:15      ASSESSMENT:  CAP---still symptomatic            Hospital Problems:  Principal Problem:    Sepsis ([address]  Active Problems:    Hypertension    Hypothyroidism    Depression    Kyphoscoliosis    Chronic interstitial lung disease ([address]    Chronic diarrhea    Chronic hypercapnic respiratory failure     Community acquired pneumonia of right lung    Acute cystitis    Hypomagnesemia    Anxiety    BAYRON (obstructive sleep apnea)    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    Chronic cor pulmonale (HCC)    Pulmonary hypertension due to hypoxia    IPF (idiopathic pulmonary fibrosis) ([address]      All Problems:  Patient Active Problem List   Diagnosis    Shoulder tendinitis Bilateral with some osteoarthritis    Hypertension    Hypothyroidism    Arthritis of left hand / possible rheumatoid versus osteoarthritis    Epigastric pain    Weight loss    CAP (community acquired pneumonia)    Osteoarthritis    Depression    Acute on chronic respiratory failure with hypoxia and hypercapnia (HCC)    Mild persistent asthma without complication    Kyphoscoliosis    Chronic interstitial lung disease ([address]    Sepsis ([address]    Moderate malnutrition ([address]    Pneumonia due to organism    Chronic diarrhea    Acute on chronic respiratory failure with hypercapnia (HCC)    Chronic hypercapnic respiratory failure     Community acquired pneumonia of right lung    Acute cystitis    Hypomagnesemia    Anxiety    BAYRON (obstructive sleep apnea)    Sepsis (HCC)    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    Chronic cor pulmonale (HCC)    Pulmonary hypertension due to hypoxia    IPF (idiopathic pulmonary fibrosis) (HCC)       PLAN:  · CAP---continueIV antibiotics, steroids, respiratory therapy    HIGH RISK MEDS: none    Tameka Castillo M.D.
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
## 2017-11-05 LAB
ABSOLUTE EOS #: 0.3 K/UL (ref 0–0.4)
ABSOLUTE IMMATURE GRANULOCYTE: ABNORMAL K/UL (ref 0–0.3)
ABSOLUTE LYMPH #: 0.8 K/UL (ref 1–4.8)
ABSOLUTE MONO #: 0.8 K/UL (ref 0–1)
ANION GAP SERPL CALCULATED.3IONS-SCNC: 10 MMOL/L (ref 9–17)
BASOPHILS # BLD: 0 %
BASOPHILS ABSOLUTE: 0 K/UL (ref 0–0.2)
BUN BLDV-MCNC: 8 MG/DL (ref 8–23)
BUN/CREAT BLD: 13 (ref 9–20)
CALCIUM SERPL-MCNC: 8.4 MG/DL (ref 8.6–10.4)
CHLORIDE BLD-SCNC: 101 MMOL/L (ref 98–107)
CO2: 30 MMOL/L (ref 20–31)
CREAT SERPL-MCNC: 0.62 MG/DL (ref 0.5–0.9)
DIFFERENTIAL TYPE: ABNORMAL
EOSINOPHILS RELATIVE PERCENT: 4 %
GFR AFRICAN AMERICAN: >60 ML/MIN
GFR NON-AFRICAN AMERICAN: >60 ML/MIN
GFR SERPL CREATININE-BSD FRML MDRD: ABNORMAL ML/MIN/{1.73_M2}
GFR SERPL CREATININE-BSD FRML MDRD: ABNORMAL ML/MIN/{1.73_M2}
GLUCOSE BLD-MCNC: 109 MG/DL (ref 70–99)
HCT VFR BLD CALC: 30 % (ref 36–46)
HEMOGLOBIN: 9.9 G/DL (ref 12–16)
IMMATURE GRANULOCYTES: ABNORMAL %
LYMPHOCYTES # BLD: 12 %
MCH RBC QN AUTO: 30.1 PG (ref 26–34)
MCHC RBC AUTO-ENTMCNC: 33.1 G/DL (ref 31–37)
MCV RBC AUTO: 91 FL (ref 80–100)
MONOCYTES # BLD: 13 %
PDW BLD-RTO: 13.6 % (ref 12.1–15.2)
PLATELET # BLD: 178 K/UL (ref 140–450)
PLATELET ESTIMATE: ABNORMAL
PMV BLD AUTO: 7.8 FL (ref 6–12)
POTASSIUM SERPL-SCNC: 3.8 MMOL/L (ref 3.7–5.3)
RBC # BLD: 3.29 M/UL (ref 4–5.2)
RBC # BLD: ABNORMAL 10*6/UL
SEG NEUTROPHILS: 71 %
SEGMENTED NEUTROPHILS ABSOLUTE COUNT: 4.4 K/UL (ref 1.8–7.7)
SODIUM BLD-SCNC: 141 MMOL/L (ref 135–144)
WBC # BLD: 6.3 K/UL (ref 3.5–11)
WBC # BLD: ABNORMAL 10*3/UL

## 2017-11-05 PROCEDURE — 36415 COLL VENOUS BLD VENIPUNCTURE: CPT

## 2017-11-05 PROCEDURE — 6370000000 HC RX 637 (ALT 250 FOR IP): Performed by: INTERNAL MEDICINE

## 2017-11-05 PROCEDURE — 80048 BASIC METABOLIC PNL TOTAL CA: CPT

## 2017-11-05 PROCEDURE — 6360000002 HC RX W HCPCS: Performed by: INTERNAL MEDICINE

## 2017-11-05 PROCEDURE — 94668 MNPJ CHEST WALL SBSQ: CPT

## 2017-11-05 PROCEDURE — 94640 AIRWAY INHALATION TREATMENT: CPT

## 2017-11-05 PROCEDURE — 94664 DEMO&/EVAL PT USE INHALER: CPT

## 2017-11-05 PROCEDURE — 2580000003 HC RX 258: Performed by: INTERNAL MEDICINE

## 2017-11-05 PROCEDURE — 85025 COMPLETE CBC W/AUTO DIFF WBC: CPT

## 2017-11-05 PROCEDURE — 1200000000 HC SEMI PRIVATE

## 2017-11-05 RX ADMIN — CEFTRIAXONE 1 G: 1 INJECTION, POWDER, FOR SOLUTION INTRAMUSCULAR; INTRAVENOUS at 09:11

## 2017-11-05 RX ADMIN — RALOXIFENE HYDROCHLORIDE 60 MG: 60 TABLET, FILM COATED ORAL at 09:17

## 2017-11-05 RX ADMIN — BENZONATATE 100 MG: 100 CAPSULE ORAL at 03:45

## 2017-11-05 RX ADMIN — HYDROXYCHLOROQUINE SULFATE 200 MG: 200 TABLET ORAL at 20:45

## 2017-11-05 RX ADMIN — MOMETASONE FUROATE AND FORMOTEROL FUMARATE DIHYDRATE 2 PUFF: 200; 5 AEROSOL RESPIRATORY (INHALATION) at 21:17

## 2017-11-05 RX ADMIN — FAMOTIDINE 20 MG: 20 TABLET ORAL at 20:45

## 2017-11-05 RX ADMIN — MOMETASONE FUROATE AND FORMOTEROL FUMARATE DIHYDRATE 2 PUFF: 200; 5 AEROSOL RESPIRATORY (INHALATION) at 10:13

## 2017-11-05 RX ADMIN — FAMOTIDINE 20 MG: 20 TABLET ORAL at 09:14

## 2017-11-05 RX ADMIN — ENOXAPARIN SODIUM 40 MG: 40 INJECTION SUBCUTANEOUS at 09:14

## 2017-11-05 RX ADMIN — ONDANSETRON 4 MG: 2 INJECTION INTRAMUSCULAR; INTRAVENOUS at 10:27

## 2017-11-05 RX ADMIN — BENZONATATE 100 MG: 100 CAPSULE ORAL at 20:45

## 2017-11-05 RX ADMIN — ACETAMINOPHEN 650 MG: 325 TABLET, FILM COATED ORAL at 07:26

## 2017-11-05 RX ADMIN — ALBUTEROL SULFATE 2.5 MG: 2.5 SOLUTION RESPIRATORY (INHALATION) at 16:06

## 2017-11-05 RX ADMIN — LEVOTHYROXINE SODIUM 200 MCG: 100 TABLET ORAL at 07:26

## 2017-11-05 RX ADMIN — Medication 10 ML: at 09:17

## 2017-11-05 RX ADMIN — ALBUTEROL SULFATE 2.5 MG: 2.5 SOLUTION RESPIRATORY (INHALATION) at 10:13

## 2017-11-05 RX ADMIN — AZITHROMYCIN MONOHYDRATE 500 MG: 500 INJECTION, POWDER, LYOPHILIZED, FOR SOLUTION INTRAVENOUS at 09:23

## 2017-11-05 RX ADMIN — Medication 10 ML: at 20:46

## 2017-11-05 RX ADMIN — HYDROXYCHLOROQUINE SULFATE 200 MG: 200 TABLET ORAL at 09:14

## 2017-11-05 RX ADMIN — ALBUTEROL SULFATE 2.5 MG: 2.5 SOLUTION RESPIRATORY (INHALATION) at 21:16

## 2017-11-05 RX ADMIN — PANTOPRAZOLE SODIUM 40 MG: 40 TABLET, DELAYED RELEASE ORAL at 07:26

## 2017-11-05 RX ADMIN — FLUTICASONE PROPIONATE 1 SPRAY: 50 SPRAY, METERED NASAL at 09:17

## 2017-11-05 ASSESSMENT — PAIN DESCRIPTION - LOCATION: LOCATION: HEAD

## 2017-11-05 ASSESSMENT — PAIN SCALES - GENERAL
PAINLEVEL_OUTOF10: 5
PAINLEVEL_OUTOF10: 0
PAINLEVEL_OUTOF10: 0
PAINLEVEL_OUTOF10: 3
PAINLEVEL_OUTOF10: 0

## 2017-11-05 ASSESSMENT — PAIN DESCRIPTION - PAIN TYPE: TYPE: ACUTE PAIN

## 2017-11-05 ASSESSMENT — PAIN DESCRIPTION - DESCRIPTORS: DESCRIPTORS: HEADACHE

## 2017-11-05 NOTE — PLAN OF CARE
Problem: Airway Clearance - Ineffective:  Goal: Ability to maintain a clear airway will improve  Ability to maintain a clear airway will improve   Outcome: Ongoing  Airway remains clear at this time. No difficulty breathing noted. Problem: Fluid Volume - Deficit:  Goal: Achieves intake and output within specified parameters  Achieves intake and output within specified parameters   Outcome: Ongoing  Vitals signs stable at this time. Adequate intake and output noted. Will continue to monitor. Problem: Hyperthermia:  Goal: Ability to maintain a body temperature in the normal range will improve  Ability to maintain a body temperature in the normal range will improve   Outcome: Ongoing  Pt afebrile at this time. No signs or symptoms of infection present. Will continue to monitor. Problem: Musculor/Skeletal Functional Status  Goal: Absence of falls  Outcome: Ongoing  Remains free from falls. Fall precautions in place, slipper socks on. Pathway clear. Call light and belongings in reach. Instructed to use call light prior to getting up.

## 2017-11-05 NOTE — PROGRESS NOTES
Progress Note    SUBJECTIVE:  Patient seen for pneumonia, respiratory failure. She reports improvement. ROS:   Constitutional: negative  for fevers, and negative for chills. Respiratory: positive for shortness of breath, positive for cough, and positive for wheezing  Cardiovascular: negative for chest pain, and negative for palpitations  Gastrointestinal: negative for abdominal pain, negative for nausea,negative for vomiting, improvement in diarrhea, and negative for constipation     All other systems were reviewed with the patient and are negative unless otherwise stated in HPI    OBJECTIVE:    EXAM:  Vitals:    11/05/17 0724   BP: 124/69   Pulse: 93   Resp: 20   Temp: 99.2 °F (37.3 °C)   SpO2: 98%      Weight: 151 lb 8 oz (68.7 kg)    Height: 5' 2\" (157.5 cm)     GEN:   A & O x3, no apparent distress  EYES: No gross abnormalities. NECK: normal, supple,   PULM: On chronic O2, has bilateral wheezing, decrease BS right base, severe scoliosis  COR: regular rate & rhythm  ABD:  soft, non-tender, non-distended, normal bowel sounds, no masses or organomegaly  EXT:   no cyanosis, clubbing or edema present    NEURO: negative  SKIN:  no rashes or significant lesions    microbiology: blood culture: pending            Xr Chest Standard (2 Vw)    Result Date: 11/2/2017  FINAL REPORT EXAM:  XR CHEST STANDARD TWO VW HISTORY:  fever COMPARISON:  August 5, 2017 FINDINGS[de-identified]  Frontal and lateral views of the chest obtained. Stable mild cardiac enlargement. Prominent dextro convex curvature of the thoracic spine. Persistent chronic appearing bronchial wall thickening and interstitial opacities scattered throughout the lungs more extensive on the right. There may be a new right perihilar opacity. No pneumothorax. Diffuse osteopenia of the thoracic spine. Impression[de-identified]  Diffuse interstitial densities throughout the lungs more prominent on the right similar prior study.  Findings are compatible with chronic interstitial lung disease versus fibrosis. There may be increased density right perihilar region concerning for small focus of superimposed pneumonia.  Electronically Signed By: Nancy Cross   on  11/02/2017  01:15      ASSESSMENT:  CAP, acute on chronic respiratory failure---improving            Hospital Problems:  Principal Problem:    Sepsis ([address]  Active Problems:    Hypertension    Hypothyroidism    Depression    Kyphoscoliosis    Chronic interstitial lung disease ([address]    Chronic diarrhea    Chronic hypercapnic respiratory failure     Community acquired pneumonia of right lung    Acute cystitis    Hypomagnesemia    Anxiety    BAYRON (obstructive sleep apnea)    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    Chronic cor pulmonale (HCC)    Pulmonary hypertension due to hypoxia    IPF (idiopathic pulmonary fibrosis) ([address]      All Problems:  Patient Active Problem List   Diagnosis    Shoulder tendinitis Bilateral with some osteoarthritis    Hypertension    Hypothyroidism    Arthritis of left hand / possible rheumatoid versus osteoarthritis    Epigastric pain    Weight loss    CAP (community acquired pneumonia)    Osteoarthritis    Depression    Acute on chronic respiratory failure with hypoxia and hypercapnia (HCC)    Mild persistent asthma without complication    Kyphoscoliosis    Chronic interstitial lung disease ([address]    Sepsis ([address]    Moderate malnutrition ([address]    Pneumonia due to organism    Chronic diarrhea    Acute on chronic respiratory failure with hypercapnia (HCC)    Chronic hypercapnic respiratory failure     Community acquired pneumonia of right lung    Acute cystitis    Hypomagnesemia    Anxiety    BAYRON (obstructive sleep apnea)    Sepsis (HCC)    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    Chronic cor pulmonale (HCC)    Pulmonary hypertension due to hypoxia    IPF (idiopathic pulmonary fibrosis) (HCC)       PLAN:  · Continue IV antibiotic, respiratory therapy    HIGH RISK MEDS: none    Bran Cherry M.D.
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
## 2017-11-06 LAB
ANION GAP SERPL CALCULATED.3IONS-SCNC: 9 MMOL/L (ref 9–17)
BUN BLDV-MCNC: 8 MG/DL (ref 8–23)
BUN/CREAT BLD: 14 (ref 9–20)
CALCIUM SERPL-MCNC: 8.3 MG/DL (ref 8.6–10.4)
CHLORIDE BLD-SCNC: 101 MMOL/L (ref 98–107)
CO2: 31 MMOL/L (ref 20–31)
CREAT SERPL-MCNC: 0.57 MG/DL (ref 0.5–0.9)
DIRECT EXAM: NORMAL
FAT QUALITATIVE SPLIT STOOL: NORMAL
FECAL NEUTRAL FAT: NORMAL
GFR AFRICAN AMERICAN: >60 ML/MIN
GFR NON-AFRICAN AMERICAN: >60 ML/MIN
GFR SERPL CREATININE-BSD FRML MDRD: ABNORMAL ML/MIN/{1.73_M2}
GFR SERPL CREATININE-BSD FRML MDRD: ABNORMAL ML/MIN/{1.73_M2}
GLUCOSE BLD-MCNC: 91 MG/DL (ref 70–99)
HCT VFR BLD CALC: 30.4 % (ref 36–46)
HEMOGLOBIN: 9.9 G/DL (ref 12–16)
Lab: NORMAL
MCH RBC QN AUTO: 30 PG (ref 26–34)
MCHC RBC AUTO-ENTMCNC: 32.6 G/DL (ref 31–37)
MCV RBC AUTO: 92 FL (ref 80–100)
PDW BLD-RTO: 13.4 % (ref 12.1–15.2)
PLATELET # BLD: 226 K/UL (ref 140–450)
PMV BLD AUTO: 7.5 FL (ref 6–12)
POTASSIUM SERPL-SCNC: 3.4 MMOL/L (ref 3.7–5.3)
RBC # BLD: 3.31 M/UL (ref 4–5.2)
SODIUM BLD-SCNC: 141 MMOL/L (ref 135–144)
SPECIMEN DESCRIPTION: NORMAL
SPECIMEN DESCRIPTION: NORMAL
STATUS: NORMAL
WBC # BLD: 6.2 K/UL (ref 3.5–11)

## 2017-11-06 PROCEDURE — 94668 MNPJ CHEST WALL SBSQ: CPT

## 2017-11-06 PROCEDURE — 6360000002 HC RX W HCPCS: Performed by: INTERNAL MEDICINE

## 2017-11-06 PROCEDURE — 94664 DEMO&/EVAL PT USE INHALER: CPT

## 2017-11-06 PROCEDURE — 90686 IIV4 VACC NO PRSV 0.5 ML IM: CPT | Performed by: INTERNAL MEDICINE

## 2017-11-06 PROCEDURE — 2580000003 HC RX 258: Performed by: INTERNAL MEDICINE

## 2017-11-06 PROCEDURE — 85027 COMPLETE CBC AUTOMATED: CPT

## 2017-11-06 PROCEDURE — G0008 ADMIN INFLUENZA VIRUS VAC: HCPCS | Performed by: INTERNAL MEDICINE

## 2017-11-06 PROCEDURE — 6370000000 HC RX 637 (ALT 250 FOR IP): Performed by: PHYSICIAN ASSISTANT

## 2017-11-06 PROCEDURE — 80048 BASIC METABOLIC PNL TOTAL CA: CPT

## 2017-11-06 PROCEDURE — 6370000000 HC RX 637 (ALT 250 FOR IP): Performed by: INTERNAL MEDICINE

## 2017-11-06 PROCEDURE — 94640 AIRWAY INHALATION TREATMENT: CPT

## 2017-11-06 PROCEDURE — 36415 COLL VENOUS BLD VENIPUNCTURE: CPT

## 2017-11-06 PROCEDURE — 1200000000 HC SEMI PRIVATE

## 2017-11-06 RX ORDER — LEVOFLOXACIN 500 MG/1
500 TABLET, FILM COATED ORAL DAILY
Status: DISCONTINUED | OUTPATIENT
Start: 2017-11-06 | End: 2017-11-07 | Stop reason: HOSPADM

## 2017-11-06 RX ORDER — DIPHENOXYLATE HYDROCHLORIDE AND ATROPINE SULFATE 2.5; .025 MG/1; MG/1
1 TABLET ORAL DAILY
Status: DISCONTINUED | OUTPATIENT
Start: 2017-11-06 | End: 2017-11-07 | Stop reason: HOSPADM

## 2017-11-06 RX ADMIN — FLUTICASONE PROPIONATE 1 SPRAY: 50 SPRAY, METERED NASAL at 08:23

## 2017-11-06 RX ADMIN — ENOXAPARIN SODIUM 40 MG: 40 INJECTION SUBCUTANEOUS at 08:24

## 2017-11-06 RX ADMIN — MOMETASONE FUROATE AND FORMOTEROL FUMARATE DIHYDRATE 2 PUFF: 200; 5 AEROSOL RESPIRATORY (INHALATION) at 08:01

## 2017-11-06 RX ADMIN — LEVOFLOXACIN 500 MG: 500 TABLET, FILM COATED ORAL at 08:23

## 2017-11-06 RX ADMIN — Medication 10 ML: at 20:40

## 2017-11-06 RX ADMIN — HYDROXYCHLOROQUINE SULFATE 200 MG: 200 TABLET ORAL at 20:38

## 2017-11-06 RX ADMIN — ALBUTEROL SULFATE 2.5 MG: 2.5 SOLUTION RESPIRATORY (INHALATION) at 11:31

## 2017-11-06 RX ADMIN — LEVOTHYROXINE SODIUM 200 MCG: 100 TABLET ORAL at 06:47

## 2017-11-06 RX ADMIN — Medication 10 ML: at 14:59

## 2017-11-06 RX ADMIN — ONDANSETRON 4 MG: 2 INJECTION INTRAMUSCULAR; INTRAVENOUS at 14:59

## 2017-11-06 RX ADMIN — INFLUENZA VIRUS VACCINE 0.5 ML: 15; 15; 15; 15 SUSPENSION INTRAMUSCULAR at 10:30

## 2017-11-06 RX ADMIN — BENZONATATE 100 MG: 100 CAPSULE ORAL at 04:17

## 2017-11-06 RX ADMIN — HYDROXYCHLOROQUINE SULFATE 200 MG: 200 TABLET ORAL at 08:24

## 2017-11-06 RX ADMIN — ALBUTEROL SULFATE 2.5 MG: 2.5 SOLUTION RESPIRATORY (INHALATION) at 07:52

## 2017-11-06 RX ADMIN — ALBUTEROL SULFATE 2.5 MG: 2.5 SOLUTION RESPIRATORY (INHALATION) at 16:06

## 2017-11-06 RX ADMIN — FAMOTIDINE 20 MG: 20 TABLET ORAL at 08:24

## 2017-11-06 RX ADMIN — RALOXIFENE HYDROCHLORIDE 60 MG: 60 TABLET, FILM COATED ORAL at 08:24

## 2017-11-06 RX ADMIN — FAMOTIDINE 20 MG: 20 TABLET ORAL at 20:38

## 2017-11-06 RX ADMIN — Medication 10 ML: at 08:24

## 2017-11-06 RX ADMIN — PANTOPRAZOLE SODIUM 40 MG: 40 TABLET, DELAYED RELEASE ORAL at 06:46

## 2017-11-06 RX ADMIN — PSYLLIUM HUSK 1 PACKET: 3.4 POWDER ORAL at 08:23

## 2017-11-06 RX ADMIN — BENZONATATE 100 MG: 100 CAPSULE ORAL at 20:40

## 2017-11-06 RX ADMIN — DIPHENOXYLATE HYDROCHLORIDE AND ATROPINE SULFATE 1 TABLET: 2.5; .025 TABLET ORAL at 08:23

## 2017-11-06 RX ADMIN — BENZONATATE 100 MG: 100 CAPSULE ORAL at 11:47

## 2017-11-06 ASSESSMENT — PAIN SCALES - GENERAL
PAINLEVEL_OUTOF10: 0

## 2017-11-06 NOTE — PROGRESS NOTES
Nutrition Assessment    Type and Reason for Visit: Reassess    Nutrition Recommendations:  Encourage protein foods    Malnutrition Assessment:  · Malnutrition Status: At risk for malnutrition  · Context: Acute illness or injury  · Findings of the 6 clinical characteristics of malnutrition (Minimum of 2 out of 6 clinical characteristics is required to make the diagnosis of moderate or severe Protein Calorie Malnutrition based on AND/ASPEN Guidelines):  1. Energy Intake-Less than or equal to 75%, greater than or equal to 5 days    2. Weight Loss-No significant weight loss,    3. Fat Loss-No significant subcutaneous fat loss,    4. Muscle Loss-Mild muscle mass loss, Interosseous  5. Fluid Accumulation-Mild fluid accumulation, Extremities (+ 2 edema R/L LE)  6.  Strength-Not measured    Nutrition Diagnosis:   · Problem: Inadequate oral intake (reported)  · Etiology: related to Alteration in GI function     Signs and symptoms:  as evidenced by Patient report of (vomiting after meals )    Nutrition Assessment:  · Subjective Assessment: Appetite remains less than usual.  Some nausea, which comes after she eats. Low protein foods being chosen per interview.   · Wound Type: None  · Current Nutrition Therapies:  · Oral Diet Orders: General, 2gm Sodium   · Oral Diet intake: 26-50%, Select  · Oral Nutrition Supplement (ONS) Orders: Clear Liquid Oral Supplement  · ONS intake: % (states utilizing, one at bedside still)  · Anthropometric Measures:  · Ht: 5' 2\" (157.5 cm)   · Current Body Wt: 152 lb 3.2 oz (69 kg)  · Admission Body Wt: 145 lb (65.8 kg)  · Usual Body Wt: 153 lb (69.4 kg)  · % Weight Change: <1% residual loss,  3 months (improved in last month)  · Ideal Body Wt: 110 lb (49.9 kg),   · BMI Classification: BMI 25.0 - 29.9 Overweight    Lab Results   Component Value Date     11/06/2017    K 3.4 (L) 11/06/2017     11/06/2017    CO2 31 11/06/2017    BUN 8 11/06/2017    CREATININE 0.57 11/06/2017 GLUCOSE 91 11/06/2017    CALCIUM 8.3 (L) 11/06/2017    PROT 7.0 11/02/2017    LABALBU 3.2 (L) 11/02/2017    BILITOT 0.42 11/02/2017    ALKPHOS 50 11/02/2017    AST 21 11/02/2017    ALT 9 11/02/2017    LABGLOM >60 11/06/2017    GFRAA >60 11/06/2017    GLOB NOT REPORTED 11/02/2017     No results found for: LABA1C  No results found for: EAG    Estimated Intake vs Estimated Needs: Intake Less Than Needs    Nutrition Risk Level: Moderate    Fair PO intakes continue. Weight near usual values r/t 6.6 L fluid positive state per I/O. I encouraged protein foods upon my visit and continued use of Ensure Clear.      Nutrition Interventions:   Continue current diet, Continue current ONS  Continued Inpatient Monitoring, Education not appropriate at this time, Coordination of Care    Nutrition Evaluation:   · Evaluation: Progressing toward goals   · Goals: Po>75% meals with supplement after solids    · Monitoring: Meal Intake, Supplement Intake, Weight, Ascites/Edema, Pertinent Labs, Patient/Family Education    Electronically signed by Andrey Aleman RD, LD on 11/6/17 at 2:01 PM    Contact Number: [id number]

## 2017-11-06 NOTE — PROGRESS NOTES
Hospitalist Progress Note    SUBJECTIVE/INTERVAL HISTORY:    Patient seen in follow up for Kyphoscoliosis, Chronic interstitial lung disease, sepsis, BAYRON, UTI, diarrhea. Continues to have 4-5 loose stools daily. c diff negative. Breathing status close to baseline - mild SOB, cough. Looking forward to going home. Nausea over the weekend. States that she feels she could go home if it wasn't for her bowels. Stool studies negative. OBJECTIVE:    Vitals:   Temp: 98.3 °F (36.8 °C)  BP: (!) 112/49  Resp: 18  Pulse: 99  SpO2: 92 %  24HR INTAKE/OUTPUT:      Intake/Output Summary (Last 24 hours) at 11/06/17 0914  Last data filed at 11/06/17 7360   Gross per 24 hour   Intake             1130 ml   Output                0 ml   Net             1130 ml       -----------------------------------------------------------------  Review of Systems:  Constitutional:negative  for fevers, and negative for chills. Eyes: negative for visual disturbance   ENT: negative for sore throat, negative nasal congestion, and negative for earache  Respiratory: positive for shortness of breath, positive for cough, and negative for wheezing  Cardiovascular: negative for chest pain, negative for palpitations, and negative for syncope  Gastrointestinal: negative for abdominal pain, negative for nausea,negative for vomiting, positive for diarrhea, negative for constipation, and negative for hematochezia or melena  Genitourinary: negative for dysuria, negative for urinary urgency, negative for urinary frequency, and negative for hematuria  Skin: negative for skin rash, and negative for skin lesions  Neurological: negative for unilateral weakness, numbness or tingling.     Exam:  GEN:  alert and oriented to person, place and time, in no acute distress  EYES: PERRL  NECK: normal, supple,  no carotid bruits  PULM: bialteral crackles, faint intermittent wheezing, NO rhonchi, on O2  COR: regular rate & rhythm and no murmurs  ABD:  soft, non-tender, non-distended, normal bowel sounds, no masses or organomegaly  EXT:   edema: 1+ affecting bilateral foot, bilateral ankle  BACK: scoliosis  NEURO: follows commands, BALDWIN, no deficits  SKIN:  no rashes or significant lesions    -----------------------------------------------------------------  Diagnostic Data:  Lab Results   Component Value Date    WBC 6.2 11/06/2017    HGB 9.9 (L) 11/06/2017    HCT 30.4 (L) 11/06/2017     11/06/2017    ALT 9 11/02/2017    AST 21 11/02/2017     11/06/2017    K 3.4 (L) 11/06/2017     11/06/2017    CREATININE 0.57 11/06/2017    BUN 8 11/06/2017    CO2 31 11/06/2017    TSH 8.470 (H) 01/13/2017       ASSESSMENT:    Principal Problem:    Sepsis ([address]  Active Problems:    Hypertension    Hypothyroidism    Depression    Kyphoscoliosis    Chronic interstitial lung disease (HCC)    Chronic diarrhea    Chronic hypercapnic respiratory failure     Community acquired pneumonia of right lung    Acute cystitis    Hypomagnesemia    Anxiety    BAYRON (obstructive sleep apnea)    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    Chronic cor pulmonale (HCC)    Pulmonary hypertension due to hypoxia    IPF (idiopathic pulmonary fibrosis) ([address]      Patient Active Problem List    Diagnosis Date Noted    Moderate malnutrition ([address] 08/07/2017     Priority: Medium     Class: Present on Admission    Community acquired pneumonia of right lung 11/02/2017    Acute cystitis 11/02/2017    Hypomagnesemia 11/02/2017    BAYRON (obstructive sleep apnea) 11/02/2017    Sepsis ([address] 11/02/2017    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 11/02/2017    Chronic cor pulmonale ([address] 11/02/2017    Pulmonary hypertension due to hypoxia 11/02/2017    IPF (idiopathic pulmonary fibrosis) ([address] 11/02/2017    Anxiety     Chronic hypercapnic respiratory failure  08/11/2017    Pneumonia due to organism     Chronic diarrhea     Acute on chronic respiratory failure with hypercapnia ([address]     Sepsis ([facility] 08/07/2017    Chronic interstitial lung disease ([facility] 06/23/2017    Mild persistent asthma without complication 17/48/9588    Kyphoscoliosis 05/01/2017    Acute on chronic respiratory failure with hypoxia and hypercapnia (HCC) 04/10/2017    CAP (community acquired pneumonia) 04/07/2017    Osteoarthritis     Depression     Epigastric pain 01/13/2017    Weight loss 01/13/2017    Arthritis of left hand / possible rheumatoid versus osteoarthritis 08/09/2016    Hypothyroidism     Hypertension     Shoulder tendinitis Bilateral with some osteoarthritis 04/08/2015       PLAN:    Sepsis/Acute cystitis/Community acquired pneumonia of right lung   Continue abx    IPF/Restrictive lung disease/kyphoscoliosis/BAYRON/Chronic respiratory failure     Nebs   Hold meds   O2   BIPAP   Appreciate pulmonary   Pulmonary hypertension due to hypoxia/Chronic cor pulmonale     Chronic diarrhea   Appreciate gen surg   Needs outpt colonoscopy   Standing lomitil   Added fiber    Hypertension    Hypothyroidism    Depression/ Anxiety    Hypomagnesemia    Possible discharge later today     · High risk medication: none    GONZALO Darby PA-C  11/6/2017, 9:14 AM

## 2017-11-06 NOTE — PROGRESS NOTES
Progress Note    SUBJECTIVE:  FU related to shortness of breath. No vomiting but productive cough. Percussion done. OBJECTIVE:    Vitals:   TEMPERATURE:  Current - Temp: 98.3 °F (36.8 °C); Max - Temp  Av °F (37.2 °C)  Min: 98.3 °F (36.8 °C)  Max: 99.5 °F (37.5 °C)  RESPIRATIONS RANGE: Resp  Av.7  Min: 18  Max: 20  PULSE RANGE: Pulse  Av  Min: 93  Max: 100  BLOOD PRESSURE RANGE:  Systolic (20OAV), XMQ:559 , Min:110 , DLY:896   ; Diastolic (49VIH), IUP:69, Min:49, Max:69    PULSE OXIMETRY RANGE: SpO2  Av.7 %  Min: 92 %  Max: 98 %  24HR INTAKE/OUTPUT:    Intake/Output Summary (Last 24 hours) at 17 0715  Last data filed at 17 0444   Gross per 24 hour   Intake              750 ml   Output                0 ml   Net              750 ml     -----------------------------------------------------------------  Exam:  General: A & O x3  HEENT: chronically ill  Supple neck & negative  Heart: Regular  Lungs: wheezes and few rhonchi.   Abdomen: Normal & soft, No tenderness and BS normal  Extremities:  AKA   Neuro: NonFocal     -----------------------------------------------------------------  Diagnostic Data:  Lab Results   Component Value Date    WBC 6.3 2017    HGB 9.9 (L) 2017     2017       Lab Results   Component Value Date    BUN 8 2017    CREATININE 0.62 2017     2017    K 3.8 2017    CALCIUM 8.4 (L) 2017     2017    CO2 30 2017    LABGLOM >60 2017       Lab Results   Component Value Date    WBCUA 2 TO 5 2017    RBCUA 0 TO 2 2017    EPITHUA 10 TO 20 2017    LEUKOCYTESUR TRACE (A) 2017    SPECGRAV 1.025 (H) 2017    GLUCOSEU NEGATIVE 2017    KETUA TRACE (A) 2017    PROTEINU TRACE (A) 2017    HGBUR NEGATIVE 2017    CASTUA NOT REPORTED 2017    CRYSTUA NOT REPORTED 2017    BACTERIA 1+ (A) 2017    YEAST NOT REPORTED 2017       Lab Results   Component Value Date    TROPONINT <0.03 04/08/2017       Xr Chest Standard (2 Vw)    Result Date: 11/2/2017  FINAL REPORT EXAM:  XR CHEST STANDARD TWO VW HISTORY:  fever COMPARISON:  August 5, 2017 FINDINGS[de-identified]  Frontal and lateral views of the chest obtained. Stable mild cardiac enlargement. Prominent dextro convex curvature of the thoracic spine. Persistent chronic appearing bronchial wall thickening and interstitial opacities scattered throughout the lungs more extensive on the right. There may be a new right perihilar opacity. No pneumothorax. Diffuse osteopenia of the thoracic spine. Impression[de-identified]  Diffuse interstitial densities throughout the lungs more prominent on the right similar prior study. Findings are compatible with chronic interstitial lung disease versus fibrosis. There may be increased density right perihilar region concerning for small focus of superimposed pneumonia.  

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
## 2017-11-06 NOTE — PROGRESS NOTES
Feeling  Better. No SOB. Nausea after eating. Vitals:    11/06/17 1448   BP: 116/61   Pulse: 89   Resp: 18   Temp: 98.8 °F (37.1 °C)   SpO2: 98%     Regular  Clear with no wheezes. No edema. 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 Outpatient US gallbladder. zofran at home.

## 2017-11-06 NOTE — PLAN OF CARE
Problem: Airway Clearance - Ineffective:  Goal: Clear lung sounds  Clear lung sounds   Outcome: Ongoing  Lungs have inspiratory wheezes and fine crackles, SpO2 was 97% on 2L NC, will continue to monitor. Problem: Fluid Volume - Deficit:  Goal: Achieves intake and output within specified parameters  Achieves intake and output within specified parameters   Outcome: Ongoing  Monitoring I/O. Problem: Hyperthermia:  Goal: Ability to maintain a body temperature in the normal range will improve  Ability to maintain a body temperature in the normal range will improve   Outcome: Ongoing  Temperature WNL, will continue to monitor. Problem: Pain:  Goal: Pain level will decrease  Pain level will decrease   Outcome: Ongoing  Pt is able to verbalize when in pain. Pt denies pain at this time. Will continue to monitor.

## 2017-11-06 NOTE — PLAN OF CARE
Problem: Nutrition  Goal: Optimal nutrition therapy  Outcome: Ongoing  Monitor meal intake    Problem: Discharge Planning:  Goal: Discharged to appropriate level of care  Discharged to appropriate level of care   Outcome: Ongoing  Home at discharge    Problem: Airway Clearance - Ineffective:  Goal: Clear lung sounds  Clear lung sounds   Outcome: Ongoing  Monitor lung sounds Q shift and prn    Problem: Fluid Volume - Deficit:  Goal: Achieves intake and output within specified parameters  Achieves intake and output within specified parameters   Outcome: Ongoing  Accurate I/O    Problem: Musculor/Skeletal Functional Status  Goal: Highest potential functional level  Outcome: Ongoing  PT/OT for strengthening    Problem: Pain:  Goal: Pain level will decrease  Pain level will decrease   Outcome: Ongoing  Continuing to monitor/assess pain at least every 4 hours. Informed patient of adverse complications of uncontrolled pain. Verbalized understanding and agrees to report increases in pain level. Educated that pain medication addiction risk is greatly reduced when medication is used for acute pain on a short term basis. Plan patient's day so activities occur at the peak level of medication. Will be medicated before procedures and activities that increase pain so to prevent uncontrollable pain. Will provide distractions such as TV, music, reading, and/or visitors. Will inform physician of ineffective pain control. Will continue to monitor and medicate as ordered.

## 2017-11-07 ENCOUNTER — APPOINTMENT (OUTPATIENT)
Dept: ULTRASOUND IMAGING | Age: 67
DRG: 871 | End: 2017-11-07
Payer: MEDICARE

## 2017-11-07 ENCOUNTER — APPOINTMENT (OUTPATIENT)
Dept: NUCLEAR MEDICINE | Age: 67
DRG: 871 | End: 2017-11-07
Payer: MEDICARE

## 2017-11-07 VITALS
HEART RATE: 94 BPM | BODY MASS INDEX: 27.97 KG/M2 | WEIGHT: 152 LBS | OXYGEN SATURATION: 97 % | DIASTOLIC BLOOD PRESSURE: 59 MMHG | TEMPERATURE: 98.3 F | HEIGHT: 62 IN | SYSTOLIC BLOOD PRESSURE: 135 MMHG | RESPIRATION RATE: 16 BRPM

## 2017-11-07 LAB
ANION GAP SERPL CALCULATED.3IONS-SCNC: 8 MMOL/L (ref 9–17)
BUN BLDV-MCNC: 6 MG/DL (ref 8–23)
BUN/CREAT BLD: 10 (ref 9–20)
CALCIUM SERPL-MCNC: 8.2 MG/DL (ref 8.6–10.4)
CHLORIDE BLD-SCNC: 102 MMOL/L (ref 98–107)
CO2: 32 MMOL/L (ref 20–31)
CREAT SERPL-MCNC: 0.58 MG/DL (ref 0.5–0.9)
CULTURE: NORMAL
GFR AFRICAN AMERICAN: >60 ML/MIN
GFR NON-AFRICAN AMERICAN: >60 ML/MIN
GFR SERPL CREATININE-BSD FRML MDRD: ABNORMAL ML/MIN/{1.73_M2}
GFR SERPL CREATININE-BSD FRML MDRD: ABNORMAL ML/MIN/{1.73_M2}
GLUCOSE BLD-MCNC: 96 MG/DL (ref 70–99)
HCT VFR BLD CALC: 28.5 % (ref 36–46)
HEMOGLOBIN: 9.3 G/DL (ref 12–16)
Lab: NORMAL
Lab: NORMAL
MCH RBC QN AUTO: 30 PG (ref 26–34)
MCHC RBC AUTO-ENTMCNC: 32.6 G/DL (ref 31–37)
MCV RBC AUTO: 91.9 FL (ref 80–100)
PDW BLD-RTO: 13 % (ref 12.1–15.2)
PLATELET # BLD: 190 K/UL (ref 140–450)
PMV BLD AUTO: 7.9 FL (ref 6–12)
POTASSIUM SERPL-SCNC: 3.4 MMOL/L (ref 3.7–5.3)
RBC # BLD: 3.11 M/UL (ref 4–5.2)
SODIUM BLD-SCNC: 142 MMOL/L (ref 135–144)
SPECIMEN DESCRIPTION: NORMAL
SPECIMEN DESCRIPTION: NORMAL
STATUS: NORMAL
STATUS: NORMAL
WBC # BLD: 5.1 K/UL (ref 3.5–11)

## 2017-11-07 PROCEDURE — 6360000002 HC RX W HCPCS: Performed by: RADIOLOGY

## 2017-11-07 PROCEDURE — 85027 COMPLETE CBC AUTOMATED: CPT

## 2017-11-07 PROCEDURE — 80048 BASIC METABOLIC PNL TOTAL CA: CPT

## 2017-11-07 PROCEDURE — 2580000003 HC RX 258: Performed by: INTERNAL MEDICINE

## 2017-11-07 PROCEDURE — 3430000000 HC RX DIAGNOSTIC RADIOPHARMACEUTICAL: Performed by: PHYSICIAN ASSISTANT

## 2017-11-07 PROCEDURE — 6360000002 HC RX W HCPCS: Performed by: INTERNAL MEDICINE

## 2017-11-07 PROCEDURE — 6370000000 HC RX 637 (ALT 250 FOR IP): Performed by: INTERNAL MEDICINE

## 2017-11-07 PROCEDURE — 2580000003 HC RX 258: Performed by: RADIOLOGY

## 2017-11-07 PROCEDURE — 94664 DEMO&/EVAL PT USE INHALER: CPT

## 2017-11-07 PROCEDURE — 94668 MNPJ CHEST WALL SBSQ: CPT

## 2017-11-07 PROCEDURE — 36415 COLL VENOUS BLD VENIPUNCTURE: CPT

## 2017-11-07 PROCEDURE — 6370000000 HC RX 637 (ALT 250 FOR IP): Performed by: PHYSICIAN ASSISTANT

## 2017-11-07 PROCEDURE — 78227 HEPATOBIL SYST IMAGE W/DRUG: CPT

## 2017-11-07 PROCEDURE — 94640 AIRWAY INHALATION TREATMENT: CPT

## 2017-11-07 PROCEDURE — 76705 ECHO EXAM OF ABDOMEN: CPT

## 2017-11-07 PROCEDURE — A9537 TC99M MEBROFENIN: HCPCS | Performed by: PHYSICIAN ASSISTANT

## 2017-11-07 RX ORDER — LEVOFLOXACIN 500 MG/1
500 TABLET, FILM COATED ORAL DAILY
Qty: 5 TABLET | Refills: 0 | Status: SHIPPED | OUTPATIENT
Start: 2017-11-08 | End: 2018-05-09

## 2017-11-07 RX ORDER — ONDANSETRON 4 MG/1
4 TABLET, FILM COATED ORAL 3 TIMES DAILY PRN
Qty: 10 TABLET | Refills: 0 | Status: SHIPPED | OUTPATIENT
Start: 2017-11-07 | End: 2019-07-16

## 2017-11-07 RX ORDER — POTASSIUM CHLORIDE 20 MEQ/1
20 TABLET, EXTENDED RELEASE ORAL ONCE
Status: COMPLETED | OUTPATIENT
Start: 2017-11-07 | End: 2017-11-07

## 2017-11-07 RX ADMIN — RALOXIFENE HYDROCHLORIDE 60 MG: 60 TABLET, FILM COATED ORAL at 11:44

## 2017-11-07 RX ADMIN — ALBUTEROL SULFATE 2.5 MG: 2.5 SOLUTION RESPIRATORY (INHALATION) at 06:21

## 2017-11-07 RX ADMIN — POTASSIUM CHLORIDE 20 MEQ: 20 TABLET, EXTENDED RELEASE ORAL at 11:54

## 2017-11-07 RX ADMIN — FLUTICASONE PROPIONATE 1 SPRAY: 50 SPRAY, METERED NASAL at 11:43

## 2017-11-07 RX ADMIN — ALBUTEROL SULFATE 2.5 MG: 2.5 SOLUTION RESPIRATORY (INHALATION) at 15:52

## 2017-11-07 RX ADMIN — BENZONATATE 100 MG: 100 CAPSULE ORAL at 19:11

## 2017-11-07 RX ADMIN — DIPHENOXYLATE HYDROCHLORIDE AND ATROPINE SULFATE 1 TABLET: 2.5; .025 TABLET ORAL at 11:45

## 2017-11-07 RX ADMIN — ENOXAPARIN SODIUM 40 MG: 40 INJECTION SUBCUTANEOUS at 11:45

## 2017-11-07 RX ADMIN — LEVOTHYROXINE SODIUM 200 MCG: 100 TABLET ORAL at 11:45

## 2017-11-07 RX ADMIN — HYDROXYCHLOROQUINE SULFATE 200 MG: 200 TABLET ORAL at 20:15

## 2017-11-07 RX ADMIN — MOMETASONE FUROATE AND FORMOTEROL FUMARATE DIHYDRATE 2 PUFF: 200; 5 AEROSOL RESPIRATORY (INHALATION) at 10:14

## 2017-11-07 RX ADMIN — PANTOPRAZOLE SODIUM 40 MG: 40 TABLET, DELAYED RELEASE ORAL at 11:45

## 2017-11-07 RX ADMIN — SODIUM CHLORIDE 1.38 MCG: 9 INJECTION, SOLUTION INTRAVENOUS at 18:11

## 2017-11-07 RX ADMIN — ALBUTEROL SULFATE 2.5 MG: 2.5 SOLUTION RESPIRATORY (INHALATION) at 10:14

## 2017-11-07 RX ADMIN — Medication 5 MILLICURIE: at 17:20

## 2017-11-07 RX ADMIN — Medication 10 ML: at 11:46

## 2017-11-07 RX ADMIN — FAMOTIDINE 20 MG: 20 TABLET ORAL at 11:44

## 2017-11-07 RX ADMIN — LEVOFLOXACIN 500 MG: 500 TABLET, FILM COATED ORAL at 11:45

## 2017-11-07 RX ADMIN — PSYLLIUM HUSK 1 PACKET: 3.4 POWDER ORAL at 11:45

## 2017-11-07 RX ADMIN — HYDROXYCHLOROQUINE SULFATE 200 MG: 200 TABLET ORAL at 11:44

## 2017-11-07 ASSESSMENT — PAIN SCALES - GENERAL
PAINLEVEL_OUTOF10: 0

## 2017-11-07 NOTE — PROGRESS NOTES
non-prod. [x]  Effective & prod. Less than 25 ml (2 TBSP) over past 24 hrs []  Ineffective & non-prod to less than 25 ML over past 24 hrs []  Ineffective and, or greater than 25 ml sputum prod. past 24 hrs. []  Nonspon- taneous; Requires suctioning 1   Pulmonary History  (PULM HX) []  No smoking and no chronic pulmonaryhistory []  Former smoker. Quit over 12 mos. ago []  Current smoker or quit w/ in 12 mos []  Pulm. History and, or 20 pk/yr smoking hx [x]  Admitted w/ acute pulm. dx and, or has been admitted w/ pulm. dx 2 or more times over past 12 mos 4   Surgical History this Admit  (SURG HX) [x]  No surgery []  General surgery []  Lower abdominal []  Thoracic or upper abdominal   []  Thoracic w/ pulm. disease 0   Chest X-Ray (CXR)/CT Scan []  Clear or not applicable []  Not available []  Atelect- asis or pleural effusions [x]  Localized infiltrate or pulm. edema []  Con-solidated Infiltrates, bilateral, or in more than 1 lobe 3   Slow or Forced VC, FEV1 OR PEFR (PULM FXN)  [x]  80% or greater, or not indicated []  Pt. unable to perform []  FEV1 or PEFR or VC 51-79%. []  FEV1 or PEFR or VC  30-49%   []  FEV1 or PEFR or VC less than 30%   0   TOTAL ACUITY: 13       CARE PLAN    If Acuity Level is 2, 3, or 4 in any of the following:    [] BILATERAL BREATH SOUNDS (BBS)     [] PULMONARY HISTORY (PULM HX)  [] PULMONARY FUNCTION (PULM FX)    Goal: Improve respiratory functions in patients with airway disease and decrease WOB    [x] AEROSOL PROTOCOL    Total Acuity:   16-32  []  Secondary Assessment in 24 hrs Total Acuity:  9-15  [x]  Secondary Assessment in 24 hrs Total Acuity:  4-8  []  Secondary Assessment in 48 hrs Total Acuity:  0-3  []  Secondary Assessment in 72 hrs   HHN AEROSOL THERAPY with  [physician-ordered bronchodilator(s)] q 4 & Albuterol PRN q2 hrs. Breath-Actuated Neb if BBS Acuity = 4, and pt. can use MP. Notify physician if condition deteriorates.   HHN AEROSOL THERAPY with  [physician-ordered bronchodilator(s)]  QID and Albuterol PRN q4 hrs. Breath-Actuated Neb if BBS Acuity = 4, and pt. can use MP. Notify physician if condition deteriorates. MDI THERAPY with  2 actuations of [physician-ordered bronchodilator(s)] via spacer TID Albuterol and PRNq4 hrs. If unable to utilize MDI: HHN [physician-ordered bronchodilator(s)] TID and Albuterol PRN q4 hrs. Notify physician if condition deteriorates. MDI THERAPY with  [physician-ordered bronchodilator(s)] via spacer TID PRN. If unable to utilize MDI: HHN [physician-ordered bronchodilator(s)] TID PRN. Notify physician if condition deteriorates. If Acuity Level is 2, 3, or 4 in any of the following:    [] COUGH     [] SURGICAL HISTORY (SURG HX)  [] CHEST XRAY (CXR)    Goal: Improvement in sputum mobilization in patients with ineffective airway clearance. Reverse atelectasis. [x] Bronchopulmonary Hygiene Protocol    Total Acuity:   16-32  []  Secondary Assessment in 24 hrs Total Acuity:  9-15  [x]  Secondary Assessment in 24 hrs Total Acuity:  4-8  []  Secondary Assessment in 48 hrs Total Acuity:  0-3  []  Secondary Assessment in 72 hrs   METANEB QID with [physician-ordered bronchodilator(s)] if CXR Acuity = 4; otherwise:  PD&P, PEP, or Vest QID & PRN  NT Sxn PRN for ineffective cough  METANEB QID with [physician-ordered bronchodilator(s)] if CXR Acuity = 4; otherwise:  PD&P, PEP, or Vest TID & PRN  NT Sxn PRN for ineffective cough  Instruct patient to self-perform IS q1hr WA   Directed Cough self-performed q1hr WA     If Acuity Level is 2 or above in the following:    [] PULMONARY HISTORY (PULM HX)    Goal: Assist patient in quitting smoking to slow or stop the progression of lung disease.     [] Smoking Cessation Protocol    SMOKING CESSATION EDUCATION provided according to policy [email or web address]: (agatha with an X)  ____Yes    ____ No     ____ NA    Smoking Cessation Booklet given:  ____Yes  ____No ____Patient Lindalou Miracle

## 2017-11-07 NOTE — PLAN OF CARE
Problem: Pain:  Goal: Pain level will decrease  Pain level will decrease   Outcome: Ongoing  Patient's pain level has been assessed using the 1-10 scale and medication has been administered as ordered depending on stated pain level. Pain rating and characteristics are being charted to track progress and reassessment of pain is being assessed. Will continue to monitor. Problem: Safety:  Goal: Free from accidental physical injury  Free from accidental physical injury  Outcome: Ongoing  Patient alert and oriented, has demonstrated proper use of call light for assistance. Bed is in lowest position with wheels locked for safety. Will continue to monitor. Problem: Daily Care:  Goal: Daily care needs are met  Daily care needs are met  Outcome: Ongoing  Patient's daily cares and needs are being assessed each shift. Consideration is given according to patient's ability to assist with ADL's, and hourly rounding consists of asking patient if any help is needed. Will continue to monitor. Problem: Skin Integrity:  Goal: Skin integrity will stabilize  Skin integrity will stabilize  Outcome: Ongoing  Patient's skin condition is being assessed each shift and changes are being charted. Pillows are being used to relieve bony prominences and patient is being reminded to turn frequently to help maintain integrity of skin. Heels are also being elevated when patient is in bed. Will continue to monitor.

## 2017-11-07 NOTE — PROGRESS NOTES
PALLIATIVE CARE  Brief follow up visit with Bishop Rodriguez. She is sitting up in the chair with her  visiting. States she is feeling better. Breathing back to baseline. Non-productive cough noted.  was started on metamucil and that she is not having diarrhea anymore.  has nausea and is having an ultrasound of her gallbladder today. Hopes to go home later today. Denies any needs or concerns. Encouragement and support given. Will follow and support. Zahira Lubin RN, Sixto Beltrán and Tayler Mckeon Nurse Coordinator  11/7/2017 10:33 AM

## 2017-11-07 NOTE — PROGRESS NOTES
BALDWIN, no deficits  SKIN:  no rashes or significant lesions    -----------------------------------------------------------------  Diagnostic Data:  Lab Results   Component Value Date    WBC 5.1 11/07/2017    HGB 9.3 (L) 11/07/2017    HCT 28.5 (L) 11/07/2017     11/07/2017    ALT 9 11/02/2017    AST 21 11/02/2017     11/07/2017    K 3.4 (L) 11/07/2017     11/07/2017    CREATININE 0.58 11/07/2017    BUN 6 (L) 11/07/2017    CO2 32 (H) 11/07/2017    TSH 8.470 (H) 01/13/2017       ASSESSMENT:    Principal Problem:    Sepsis ([address]  Active Problems:    Hypertension    Hypothyroidism    Depression    Kyphoscoliosis    Chronic interstitial lung disease (HCC)    Chronic diarrhea    Chronic hypercapnic respiratory failure     Community acquired pneumonia of right lung    Acute cystitis    Hypomagnesemia    Anxiety    BAYRON (obstructive sleep apnea)    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    Chronic cor pulmonale (HCC)    Pulmonary hypertension due to hypoxia    IPF (idiopathic pulmonary fibrosis) ([address]      Patient Active Problem List    Diagnosis Date Noted    Moderate malnutrition ([address] 08/07/2017     Priority: Medium     Class: Present on Admission    Community acquired pneumonia of right lung 11/02/2017    Acute cystitis 11/02/2017    Hypomagnesemia 11/02/2017    BAYRON (obstructive sleep apnea) 11/02/2017    Sepsis ([address] 11/02/2017    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 11/02/2017    Chronic cor pulmonale ([address] 11/02/2017    Pulmonary hypertension due to hypoxia 11/02/2017    IPF (idiopathic pulmonary fibrosis) ([address] 11/02/2017    Anxiety     Chronic hypercapnic respiratory failure  08/11/2017    Pneumonia due to organism     Chronic diarrhea     Acute on chronic respiratory failure with hypercapnia ([address]     Sepsis ([address] 08/07/2017    Chronic interstitial lung disease ([address] 06/23/2017    Mild persistent asthma without complication 24/90/4965    Kyphoscoliosis 05/01/2017

## 2017-11-07 NOTE — PLAN OF CARE
Problem: Nutrition  Goal: Optimal nutrition therapy  Outcome: Ongoing  Accurate I/O. Monitor meal intake. Nutritional supplements    Problem: Discharge Planning:  Goal: Discharged to appropriate level of care  Discharged to appropriate level of care   Outcome: Ongoing  Patient verbalizes understanding of care plan and discharge instructions. Denies anticipated discharge needs. Problem: Airway Clearance - Ineffective:  Goal: Clear lung sounds  Clear lung sounds   Outcome: Ongoing  O2 as ordered. Cpap at HS. Monitor pulse ox    Problem: Fluid Volume - Deficit:  Goal: Achieves intake and output within specified parameters  Achieves intake and output within specified parameters   Outcome: Ongoing  Accurate I/O    Problem: Hyperthermia:  Goal: Ability to maintain a body temperature in the normal range will improve  Ability to maintain a body temperature in the normal range will improve   Outcome: Completed Date Met: 11/07/17      Problem: Pain:  Goal: Pain level will decrease  Pain level will decrease   Outcome: Ongoing  Continuing to monitor/assess pain at least every 4 hours. Informed patient of adverse complications of uncontrolled pain. Verbalized understanding and agrees to report increases in pain level. Educated that pain medication addiction risk is greatly reduced when medication is used for acute pain on a short term basis. Plan patient's day so activities occur at the peak level of medication. Will be medicated before procedures and activities that increase pain so to prevent uncontrollable pain. Will provide distractions such as TV, music, reading, and/or visitors. Will inform physician of ineffective pain control. Will continue to monitor and medicate as ordered.        Problem: Daily Care:  Goal: Daily care needs are met  Daily care needs are met   Outcome: Completed Date Met: 11/07/17      Problem: Skin Integrity:  Goal: Skin integrity will stabilize  Skin integrity will stabilize   Outcome: Ongoing  Monitoring for skin breakdown with assessments and as needed. Repositioning and turning patient at least every two hours as patient allows. Heels elevated off of bed. Encouraging good oral intake.

## 2017-11-09 NOTE — DISCHARGE SUMMARY
Discharge Summary    Antonio Herzog  :  1950  MRN:  [id number]    Admit date:  2017      Discharge date:  2017     Admitting Physician:  Liam Campbell MD    Discharge Diagnoses:      Principal Problem:    Sepsis [facility])  Active Problems:    Hypertension    Hypothyroidism    Depression    Kyphoscoliosis    Chronic interstitial [facility] disease (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      Active Hospital Problems    Diagnosis Date Noted    Community acquired pneumonia of right lung [J18.9] 2017    Acute cystitis [N30.00] 2017    Hypomagnesemia [E83.42] 2017    BAYRON (obstructive sleep apnea) [G47.33] 2017    Sepsis (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 [J84.112] 2017    Anxiety [F41.9]     Chronic hypercapnic respiratory failure  [J96.12] 2017    Chronic diarrhea [K52.9]     Chronic interstitial lung disease ([facility] [J84.9] 2017    Kyphoscoliosis [M41.9] 2017    Depression [F32.9]     Hypothyroidism [E03.9]     Hypertension [I10]        Discharge Medications:       Leydi Star   Home Medication Instructions CER:[id number]    Printed on:17 1112   Medication Information                      albuterol (PROVENTIL) (2.5 MG/3ML) 0.083% nebulizer solution  Take 3 mLs by nebulization every 6 hours as needed for Wheezing Nebulizer treatment             benzonatate (TESSALON) 100 MG capsule  take 1 capsule by mouth three times a day if needed             Calcium Carbonate-Vitamin D (OYSTER SHELL CALCIUM/D) 500-200 MG-UNIT TABS  2 tabs daily             cholestyramine (QUESTRAN) 4 g packet  Take 1 packet by mouth 3 times daily (with meals) for 10 days             fluticasone (FLONASE) 50 MCG/ACT nasal spray  1 spray by Nasal route daily             folic acid (FOLVITE) 1 MG tablet  take 1 tablet by mouth once daily             furosemide (LASIX) 20 MG tablet  Take 1 tablet by mouth daily as needed (EDEMA)             hydroxychloroquine (PLAQUENIL) 200 MG tablet  take 1 and 1/2 tablets by mouth once daily             lactobacillus (CULTURELLE) capsule  Take 1 capsule by mouth daily (with breakfast)             levofloxacin (LEVAQUIN) 500 MG tablet  Take 1 tablet by mouth daily for 5 days             levothyroxine (SYNTHROID) 200 MCG tablet  take 1 tablet by mouth once daily             mometasone-formoterol (DULERA) 200-5 MCG/ACT inhaler  Inhale 2 puffs into the lungs 2 times daily             ondansetron (ZOFRAN) 4 MG tablet  Take 1 tablet by mouth 3 times daily as needed for Nausea or Vomiting             pantoprazole (PROTONIX) 40 MG tablet  Take 1 tablet by mouth every morning (before breakfast)             raloxifene (EVISTA) 60 MG tablet  Take 60 mg by mouth daily                  Consultants:  general surgery & pulmonary    Hospital Course:   Javier Hernandez is a 74 y.o. female admitted with COPD And diarrhea. Treated with antibiotics and appropriate respiratory care. Incidental findings included biliary colic with persistent nausea identified with an ultrasound of the gallbladder showing cholelithiasis with out any evidence of cholecystitis. HIDA scan was ordered and excluded cholecystitis. The patient was discharged home and will hopefully have an outpatient elective surgical procedure. I will personally discuss with general surgery. Exam: Regular heart rate. Clear lung sounds are just trace wheeze. Abdomen soft with epigastric tenderness. No edema.     Disposition:   Home    Patient will be followed by Jeremy Young MD in 1-2 weeks    Signed:  Jeremy Young  11/9/2017, 11:12 AM

## 2017-11-10 ENCOUNTER — OFFICE VISIT (OUTPATIENT)
Dept: SURGERY | Age: 67
End: 2017-11-10
Payer: MEDICARE

## 2017-11-10 VITALS
BODY MASS INDEX: 28.37 KG/M2 | DIASTOLIC BLOOD PRESSURE: 79 MMHG | HEIGHT: 62 IN | WEIGHT: 154.2 LBS | RESPIRATION RATE: 14 BRPM | HEART RATE: 109 BPM | SYSTOLIC BLOOD PRESSURE: 137 MMHG | TEMPERATURE: 100.2 F

## 2017-11-10 DIAGNOSIS — K80.00 GALLSTONES AND INFLAMMATION OF GALLBLADDER WITHOUT OBSTRUCTION: Primary | ICD-10-CM

## 2017-11-10 PROCEDURE — 4040F PNEUMOC VAC/ADMIN/RCVD: CPT | Performed by: SURGERY

## 2017-11-10 PROCEDURE — 99214 OFFICE O/P EST MOD 30 MIN: CPT | Performed by: SURGERY

## 2017-11-10 PROCEDURE — 1090F PRES/ABSN URINE INCON ASSESS: CPT | Performed by: SURGERY

## 2017-11-10 PROCEDURE — G8399 PT W/DXA RESULTS DOCUMENT: HCPCS | Performed by: SURGERY

## 2017-11-10 PROCEDURE — 1111F DSCHRG MED/CURRENT MED MERGE: CPT | Performed by: SURGERY

## 2017-11-10 PROCEDURE — G8482 FLU IMMUNIZE ORDER/ADMIN: HCPCS | Performed by: SURGERY

## 2017-11-10 PROCEDURE — 3017F COLORECTAL CA SCREEN DOC REV: CPT | Performed by: SURGERY

## 2017-11-10 PROCEDURE — 1036F TOBACCO NON-USER: CPT | Performed by: SURGERY

## 2017-11-10 PROCEDURE — 1123F ACP DISCUSS/DSCN MKR DOCD: CPT | Performed by: SURGERY

## 2017-11-10 PROCEDURE — G8427 DOCREV CUR MEDS BY ELIG CLIN: HCPCS | Performed by: SURGERY

## 2017-11-10 PROCEDURE — G8417 CALC BMI ABV UP PARAM F/U: HCPCS | Performed by: SURGERY

## 2017-11-10 PROCEDURE — 3014F SCREEN MAMMO DOC REV: CPT | Performed by: SURGERY

## 2017-11-10 NOTE — PROGRESS NOTES
Rfl: 3    folic acid (FOLVITE) 1 MG tablet, take 1 tablet by mouth once daily, Disp: , Rfl: 1    levothyroxine (SYNTHROID) 200 MCG tablet, take 1 tablet by mouth once daily, Disp: 30 tablet, Rfl: 10    raloxifene (EVISTA) 60 MG tablet, Take 60 mg by mouth daily , Disp: , Rfl: 1    Calcium Carbonate-Vitamin D (OYSTER SHELL CALCIUM/D) 500-200 MG-UNIT TABS, 2 tabs daily, Disp: 30 tablet, Rfl: 0    Physical Exam:  Vital signs and Nurse's note reviewed  Gen:  A&Ox3, NAD. Pleasant and cooperative. HEENT: NCAT, PERRLA, EOMI, no scleral icterus  Neck: no goiter  CVS: Regular rate and rhythm  Resp:  no active wheezing, no labored breathing, no productive cough today, wearing O2  Abd: soft, non-tender, non-distended, bowel sounds present, no guarding, no rebound, no acute findings. It looks like she has a large lower midline incision but she says she does not, says everyone thinks that.   Ext: No clubbing, cyanosis, edema  CNS: Moves all extremities, no gross focal motor deficits  Skin: No erythema or ulcerations     Labs:   Lab Results   Component Value Date    WBC 5.1 11/07/2017    HGB 9.3 11/07/2017    HCT 28.5 11/07/2017    MCV 91.9 11/07/2017     11/07/2017     05/18/2012     Lab Results   Component Value Date     11/07/2017    K 3.4 11/07/2017     11/07/2017    CO2 32 11/07/2017    BUN 6 11/07/2017    CREATININE 0.58 11/07/2017    GLUCOSE 96 11/07/2017    GLUCOSE 90 10/27/2017    CALCIUM 8.2 11/07/2017     Lab Results   Component Value Date    ALKPHOS 50 11/02/2017    ALT 9 11/02/2017    AST 21 11/02/2017    PROT 7.0 11/02/2017    BILITOT 0.42 11/02/2017    BILIDIR <0.08 11/02/2017    LABALBU 3.2 11/02/2017    LABALBU 4.0 05/18/2012     Lab Results   Component Value Date    LACTA 1.1 11/02/2017       Radiologic Studies:    Narrative   REPORT: Ultrasound of the gallbladder/right upper quadrant.       INDICATION: Nausea for 2 weeks; upper abdominal cramps.       FINDINGS: Sonographic with a patent Sphincter of Oddi and common bile duct.  Activity is first noted within the gallbladder by 15 minutes consistent with patency of the cystic duct. Therefore, acute cholecystitis    is excluded.       Following CCK administration, visually, there is satisfactory emptying of the gallbladder.   An ejection fraction of the gallbladder is calculated to be 62 %, which is within normal limits (normal &gt; 35 %).           Impression   Impression:     Normal hepatobiliary imaging with normal hepatocellular function and patency of the cystic duct, common bile duct, and Sphincter of Oddi.  Gallbladder ejection fraction is within normal limits.              Electronically Signed By: Zari Puentes   on  11/07/2017  19:18     REPORT: Chest PA and lateral       INDICATION: Shortness of breath       FINDINGS: Compared to 11/2/2017 and 11/2/2017. Stable perihilar interstitial and alveolar infiltrates; worse on the right since yesterday. No definite pleural effusion. Findings of the right lung are overall slightly worse than seen in August suggesting    an acute on chronic process. Stable prominence of the cardiomediastinal silhouette likely due to severe underlying scoliosis. No free intraperitoneal air.           Final report electronically signed by Michel Reno on 11/3/2017 9:18 AM       Impression   Stable acute on chronic process in the right lung     Impressions/Recommendations:     Gallstones/chronic cholecystitis. Interstitial lung disease/pulmonary fibrosis/pneumonia history, chronic oxygen wearer. RA on methotrexate. Recommend robotic approach for cholecystectomy and also at a facility with anesthesiologist readily available if needed due to her particular co-morbidities. I believe she can still be done as an outpatient but will try to get done on a Monday in case of observation stay so I can check on her [facility].  Pulmonary clearance request Dr. Nidhi Vidal and/or associates, recently saw her in [facility] for pneumonia. Thank you Dr. Marisol Kunz for allowing me to participate in the care of your patients.          Benson Mcdonald DO, MPH, [address] [place] [address] [phone]

## 2017-11-20 ENCOUNTER — OFFICE VISIT (OUTPATIENT)
Dept: PULMONOLOGY | Age: 67
End: 2017-11-20
Payer: MEDICARE

## 2017-11-20 VITALS
BODY MASS INDEX: 27.97 KG/M2 | HEIGHT: 62 IN | HEART RATE: 99 BPM | OXYGEN SATURATION: 92 % | DIASTOLIC BLOOD PRESSURE: 83 MMHG | SYSTOLIC BLOOD PRESSURE: 132 MMHG | RESPIRATION RATE: 24 BRPM | TEMPERATURE: 99.1 F | WEIGHT: 152 LBS

## 2017-11-20 DIAGNOSIS — J45.30 MILD PERSISTENT ASTHMA WITHOUT COMPLICATION: ICD-10-CM

## 2017-11-20 DIAGNOSIS — M41.9 RESTRICTIVE LUNG DISEASE DUE TO KYPHOSCOLIOSIS: ICD-10-CM

## 2017-11-20 DIAGNOSIS — J98.4 RESTRICTIVE LUNG DISEASE DUE TO KYPHOSCOLIOSIS: ICD-10-CM

## 2017-11-20 DIAGNOSIS — G47.33 OSA (OBSTRUCTIVE SLEEP APNEA): ICD-10-CM

## 2017-11-20 DIAGNOSIS — J96.12 CHRONIC HYPERCAPNIC RESPIRATORY FAILURE (HCC): Primary | ICD-10-CM

## 2017-11-20 DIAGNOSIS — K21.9 GASTROESOPHAGEAL REFLUX DISEASE, ESOPHAGITIS PRESENCE NOT SPECIFIED: ICD-10-CM

## 2017-11-20 DIAGNOSIS — J47.9 BRONCHIECTASIS WITHOUT COMPLICATION (HCC): ICD-10-CM

## 2017-11-20 DIAGNOSIS — J84.9 ILD (INTERSTITIAL LUNG DISEASE) (HCC): ICD-10-CM

## 2017-11-20 DIAGNOSIS — R09.82 POST-NASAL DRIP: ICD-10-CM

## 2017-11-20 DIAGNOSIS — M41.9 KYPHOSCOLIOSIS: ICD-10-CM

## 2017-11-20 DIAGNOSIS — R05.3 CHRONIC COUGH: ICD-10-CM

## 2017-11-20 PROCEDURE — 3014F SCREEN MAMMO DOC REV: CPT | Performed by: INTERNAL MEDICINE

## 2017-11-20 PROCEDURE — 1111F DSCHRG MED/CURRENT MED MERGE: CPT | Performed by: INTERNAL MEDICINE

## 2017-11-20 PROCEDURE — 4040F PNEUMOC VAC/ADMIN/RCVD: CPT | Performed by: INTERNAL MEDICINE

## 2017-11-20 PROCEDURE — G8417 CALC BMI ABV UP PARAM F/U: HCPCS | Performed by: INTERNAL MEDICINE

## 2017-11-20 PROCEDURE — G8427 DOCREV CUR MEDS BY ELIG CLIN: HCPCS | Performed by: INTERNAL MEDICINE

## 2017-11-20 PROCEDURE — 3017F COLORECTAL CA SCREEN DOC REV: CPT | Performed by: INTERNAL MEDICINE

## 2017-11-20 PROCEDURE — 1090F PRES/ABSN URINE INCON ASSESS: CPT | Performed by: INTERNAL MEDICINE

## 2017-11-20 PROCEDURE — 1036F TOBACCO NON-USER: CPT | Performed by: INTERNAL MEDICINE

## 2017-11-20 PROCEDURE — G8482 FLU IMMUNIZE ORDER/ADMIN: HCPCS | Performed by: INTERNAL MEDICINE

## 2017-11-20 PROCEDURE — 1123F ACP DISCUSS/DSCN MKR DOCD: CPT | Performed by: INTERNAL MEDICINE

## 2017-11-20 PROCEDURE — 99215 OFFICE O/P EST HI 40 MIN: CPT | Performed by: INTERNAL MEDICINE

## 2017-11-20 PROCEDURE — G8399 PT W/DXA RESULTS DOCUMENT: HCPCS | Performed by: INTERNAL MEDICINE

## 2017-11-20 NOTE — PATIENT INSTRUCTIONS
SURVEY:    You may be receiving a survey from [employer] regarding your visit today. Please complete the survey to enable us to provide the highest quality of care to you and your family. If you cannot score us a very good on any question, please call the office to discuss how we could of made your experience a very good one. Thank you.

## 2017-11-20 NOTE — PROGRESS NOTES
PULMONARY OP  PROGRESS NOTE      Patient:  Azeem Del Castillo  YOB: 1950    MRN: [id number]     Acct:        Pt seen and Chart reviewed. Ms. Azeem Del Castillo is here in followup for   1. Chronic hypercapnic respiratory failure      2. ILD (interstitial lung disease) ([facility]     3. 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     4. Chronic cough     5. Post-nasal drip     6. Gastroesophageal reflux disease, esophagitis presence not specified     7. Bronchiectasis without complication ([address]     8. Mild persistent asthma without complication     9. Kyphoscoliosis     10. BAYRON (obstructive sleep apnea)            Patient has been doing well since last visit. Patient has not had any hospitalizations or ER visits since seeing me in August 2017 Has been using meds as recommended. Denied any cough or sputum production. Denied any nasal drainage or heartburn. Has intermittent shortness of breath but not much wheezing. No chest pain or pressure. No acid reflux symptoms. No hemoptysis. No fevers or chills or night sweats. Patient had a sleep study since her last visit, which confirmed the diagnosis of sleep apnea with an RDI of 15, which improved with BiPAP 15/9 cm of water. Patient has been using the BiPAP machine every night with improvement in the daytime sleepiness and fatigue and tiredness.   She is being considered for cholecystectomy with robotic assistance    Subjective:   Review of Systems -   General ROS: Completed and except as mentioned above were negative   Psychological ROS:  Completed and except as mentioned above were negative  Ophthalmic ROS:  Completed and except as mentioned above were negative  ENT ROS:  Completed and except as mentioned above were negative  Allergy and Immunology ROS:  Completed and except as mentioned above were negative  Hematological and Lymphatic ROS:  Completed and except as mentioned above were negative  Endocrine ROS: Completed and except as mentioned above were negative  Breast ROS:  Completed and except as mentioned above were negative  Respiratory ROS:  Completed and except as mentioned above were negative  Cardiovascular ROS:  Completed and except as mentioned above were negative  Gastrointestinal ROS: Completed and except as mentioned above were negative  Genito-Urinary ROS:  Completed and except as mentioned above were negative  Musculoskeletal ROS:  Completed and except as mentioned above were negative  Neurological ROS:  Completed and except as mentioned above were negative  Dermatological ROS:  Completed and except as mentioned above were negative    Allergies:   Allergies   Allergen Reactions    Lactose Intolerance (Gi)        Medications:    Current Outpatient Prescriptions:     ondansetron (ZOFRAN) 4 MG tablet, Take 1 tablet by mouth 3 times daily as needed for Nausea or Vomiting, Disp: 10 tablet, Rfl: 0    hydroxychloroquine (PLAQUENIL) 200 MG tablet, take 1 and 1/2 tablets by mouth once daily, Disp: , Rfl: 0    albuterol (PROVENTIL) (2.5 MG/3ML) 0.083% nebulizer solution, Take 3 mLs by nebulization every 6 hours as needed for Wheezing Nebulizer treatment, Disp: 120 each, Rfl: 5    benzonatate (TESSALON) 100 MG capsule, take 1 capsule by mouth three times a day if needed, Disp: , Rfl: 0    furosemide (LASIX) 20 MG tablet, Take 1 tablet by mouth daily as needed (EDEMA), Disp: 30 tablet, Rfl: 0    lactobacillus (CULTURELLE) capsule, Take 1 capsule by mouth daily (with breakfast), Disp: 30 capsule, Rfl:     pantoprazole (PROTONIX) 40 MG tablet, Take 1 tablet by mouth every morning (before breakfast), Disp: 30 tablet, Rfl: 3    mometasone-formoterol (DULERA) 200-5 MCG/ACT inhaler, Inhale 2 puffs into the lungs 2 times daily, Disp: , Rfl:     fluticasone (FLONASE) 50 MCG/ACT nasal spray, 1 spray by Nasal route daily, Disp: 1 Bottle, Rfl: 3    folic acid (FOLVITE) 1 MG tablet, take 1

## 2017-11-22 RX ORDER — POTASSIUM CHLORIDE 20 MEQ/1
20 TABLET, EXTENDED RELEASE ORAL 2 TIMES DAILY
Qty: 4 TABLET | Refills: 0 | Status: SHIPPED | OUTPATIENT
Start: 2017-11-22 | End: 2019-02-13

## 2017-12-01 ENCOUNTER — OFFICE VISIT (OUTPATIENT)
Dept: SURGERY | Age: 67
End: 2017-12-01

## 2017-12-01 VITALS
RESPIRATION RATE: 24 BRPM | WEIGHT: 132 LBS | TEMPERATURE: 98.3 F | BODY MASS INDEX: 24.29 KG/M2 | HEART RATE: 89 BPM | DIASTOLIC BLOOD PRESSURE: 65 MMHG | HEIGHT: 62 IN | SYSTOLIC BLOOD PRESSURE: 118 MMHG

## 2017-12-01 DIAGNOSIS — Z09 POSTOP CHECK: Primary | ICD-10-CM

## 2017-12-01 PROCEDURE — 99024 POSTOP FOLLOW-UP VISIT: CPT | Performed by: SURGERY

## 2017-12-06 ENCOUNTER — OFFICE VISIT (OUTPATIENT)
Dept: SURGERY | Age: 67
End: 2017-12-06

## 2017-12-06 VITALS
SYSTOLIC BLOOD PRESSURE: 109 MMHG | HEIGHT: 62 IN | BODY MASS INDEX: 24.92 KG/M2 | WEIGHT: 135.4 LBS | OXYGEN SATURATION: 92 % | DIASTOLIC BLOOD PRESSURE: 70 MMHG | TEMPERATURE: 98.6 F | HEART RATE: 91 BPM | RESPIRATION RATE: 20 BRPM

## 2017-12-06 DIAGNOSIS — Z09 POSTOP CHECK: Primary | ICD-10-CM

## 2017-12-06 DIAGNOSIS — R11.0 SEVERE NAUSEA: ICD-10-CM

## 2017-12-06 DIAGNOSIS — K21.9 GASTROESOPHAGEAL REFLUX DISEASE WITHOUT ESOPHAGITIS: ICD-10-CM

## 2017-12-06 PROCEDURE — 99024 POSTOP FOLLOW-UP VISIT: CPT | Performed by: SURGERY

## 2017-12-06 RX ORDER — ONDANSETRON 4 MG/1
4 TABLET, FILM COATED ORAL EVERY 8 HOURS PRN
Qty: 30 TABLET | Refills: 0 | Status: SHIPPED | OUTPATIENT
Start: 2017-12-06 | End: 2017-12-19

## 2017-12-06 NOTE — PROGRESS NOTES
12/6/17 postop check    Liliam Kim is here for follow up with her . She is in pleasant spirits, smiling and conversational, no distress. Wears chronic oxygen. Abdominal exam soft, benign. Still feels nauseated, takes only one zofran a day. No obstructive symptoms. S/p uneventful robotic cholecystectomy 11/24/17. Recommend UGI to check for reflux, hiatal hernia, and any indication of gastroparesis. Due to significant co-morbidities, this also may be slow recovery from being under recent general anesthesia. Denies chest pain, unusual shortness of breath, fevers or chills. Tolerating diet, although decreased appetite. Will get UGI as she does not need to be further sedated again. She is very agreeable to this plan. She will follow up after test done. She is noted to be on protonix already. Also I will send a refill of zofran to her pharmacy as she feels it helps her symptoms. Prior authorization required and sent. 12/1/17 postop check     Liliam Kim is here with her , s/p robotic cholecystectomy 1 week out. Her incisions are healing well. She feels some vague lower midabdominal pressure. No shortness of breath, no chest pain, no nausea or vomiting, no fevers or chills. She wears chronic oxygen, known significant pulmonary disease. She is smiling, in good spirits, no acute distress. She is to follow up next week to make sure she is doing well. Although her US reported stones, interestingly the pathology report states no stones. She did show evidence of chronic inflammation of the gallbladder at the time of surgery. All questions answered. Her diarrhea has slowed considerably.  She has a chronic diarrhea history and for now we will hold off on colonoscopy until she fully heals and only pursue endoscopy if diarrhea worsens/persists.

## 2017-12-12 ENCOUNTER — HOSPITAL ENCOUNTER (OUTPATIENT)
Dept: GENERAL RADIOLOGY | Age: 67
Discharge: HOME | End: 2017-12-12
Payer: MEDICARE

## 2017-12-12 DIAGNOSIS — K21.9 GASTROESOPHAGEAL REFLUX DISEASE WITHOUT ESOPHAGITIS: ICD-10-CM

## 2017-12-12 DIAGNOSIS — R11.0 SEVERE NAUSEA: ICD-10-CM

## 2017-12-12 PROCEDURE — 74240 X-RAY XM UPR GI TRC 1CNTRST: CPT

## 2017-12-15 ENCOUNTER — TELEPHONE (OUTPATIENT)
Dept: SURGERY | Age: 67
End: 2017-12-15

## 2017-12-15 ENCOUNTER — OFFICE VISIT (OUTPATIENT)
Dept: SURGERY | Age: 67
End: 2017-12-15

## 2017-12-15 VITALS
HEIGHT: 62 IN | DIASTOLIC BLOOD PRESSURE: 79 MMHG | WEIGHT: 135 LBS | HEART RATE: 93 BPM | BODY MASS INDEX: 24.84 KG/M2 | TEMPERATURE: 97.6 F | SYSTOLIC BLOOD PRESSURE: 140 MMHG | RESPIRATION RATE: 20 BRPM

## 2017-12-15 DIAGNOSIS — K21.9 GASTROESOPHAGEAL REFLUX DISEASE WITHOUT ESOPHAGITIS: Primary | ICD-10-CM

## 2017-12-15 PROCEDURE — 99024 POSTOP FOLLOW-UP VISIT: CPT | Performed by: SURGERY

## 2017-12-15 RX ORDER — ONDANSETRON 4 MG/1
4 TABLET, FILM COATED ORAL EVERY 8 HOURS PRN
Qty: 30 TABLET | Refills: 1 | Status: SHIPPED | OUTPATIENT
Start: 2017-12-15 | End: 2017-12-19

## 2017-12-15 RX ORDER — PANTOPRAZOLE SODIUM 40 MG/1
40 TABLET, DELAYED RELEASE ORAL DAILY
Qty: 30 TABLET | Refills: 2 | Status: SHIPPED | OUTPATIENT
Start: 2017-12-15 | End: 2020-01-15 | Stop reason: DRUGHIGH

## 2017-12-15 RX ORDER — PROCHLORPERAZINE MALEATE 10 MG
10 TABLET ORAL EVERY 8 HOURS PRN
Qty: 20 TABLET | Refills: 0 | Status: SHIPPED | OUTPATIENT
Start: 2017-12-15 | End: 2018-06-04

## 2017-12-15 NOTE — PROGRESS NOTES
12/15/17 postop    Jerad Madison has healed well from her robotic cholecystectomy from several weeks ago. She underwent UGI, she has moderate reflux. She has severe scoliosis with severe COPD/pulmonary compression/fibrosis, mild elevation of left hemidiaphragm. Although protonix was listed on her med list, she thought protonix meant probiotic, and plus she never took the protonix. Script re-sent to her pharmacy forher to take daily for the moderate reflux. Insurance denies zofran.  Will do compazine trial.

## 2017-12-15 NOTE — TELEPHONE ENCOUNTER
Deadline to reply:  December 15, 2018 12:11 PM (In 1 year) Case ID: [id number]      Payer: [employer] - [employer]    [phone]      [employer] 4-Part NCPDP Electronic PA Form            [employer] NCPDP Prior Authorizations   View History   ondansetron (ZOFRAN) 4 MG tablet  Take 1 tablet by mouth every 8 hours as needed for Nausea or Vomiting  Dispense:  30 tablet   Refills:  1  Start:  12/15/2017     Class:  Normal  Diagnoses:  Gastroesophageal reflux disease without esophagitis  This order has been released to its destination.   To be filled at: [address], [address] [facility] [phone]: [phone]  Prior Authorization History     1 week ago Canceled - Other (Pt paid cash)

## 2017-12-15 NOTE — TELEPHONE ENCOUNTER
Pt informed that phenergan will be sent to her pharmacy d/t Zofran not covered by insurance. Pt verbalized understanding.

## 2017-12-15 NOTE — TELEPHONE ENCOUNTER
Min Huang informed that RX will not be covered with pt's insurance. Min Huang stated she will send in phenergan instead.

## 2017-12-15 NOTE — TELEPHONE ENCOUNTER
Writer spoke with Froylan Lin to prior auth the Zofran. Writer was informed the only diagnoses that would be covered for Zofran is radiation related, chemo or hyperemesis gravidarum.

## 2018-01-26 ENCOUNTER — HOSPITAL ENCOUNTER (OUTPATIENT)
Age: 68
Discharge: HOME | End: 2018-01-26
Payer: MEDICARE

## 2018-01-26 ENCOUNTER — HOSPITAL ENCOUNTER (OUTPATIENT)
Dept: GENERAL RADIOLOGY | Age: 68
Discharge: HOME | End: 2018-01-26
Payer: MEDICARE

## 2018-01-26 DIAGNOSIS — R06.2 WHEEZING: ICD-10-CM

## 2018-01-26 PROCEDURE — 71046 X-RAY EXAM CHEST 2 VIEWS: CPT

## 2018-02-02 PROBLEM — R19.7 DIARRHEA: Status: ACTIVE | Noted: 2018-02-02

## 2018-04-23 ENCOUNTER — HOSPITAL ENCOUNTER (OUTPATIENT)
Dept: PULMONOLOGY | Age: 68
Discharge: HOME | End: 2018-04-23
Payer: MEDICARE

## 2018-04-23 DIAGNOSIS — J44.1 OBSTRUCTIVE CHRONIC BRONCHITIS WITH EXACERBATION (HCC): ICD-10-CM

## 2018-04-23 PROCEDURE — 94726 PLETHYSMOGRAPHY LUNG VOLUMES: CPT

## 2018-04-23 PROCEDURE — 6360000002 HC RX W HCPCS: Performed by: INTERNAL MEDICINE

## 2018-04-23 PROCEDURE — 94729 DIFFUSING CAPACITY: CPT

## 2018-04-23 PROCEDURE — 94060 EVALUATION OF WHEEZING: CPT

## 2018-04-23 PROCEDURE — 94664 DEMO&/EVAL PT USE INHALER: CPT

## 2018-04-23 RX ORDER — ALBUTEROL SULFATE 2.5 MG/3ML
2.5 SOLUTION RESPIRATORY (INHALATION) ONCE
Status: COMPLETED | OUTPATIENT
Start: 2018-04-23 | End: 2018-04-23

## 2018-04-23 RX ADMIN — ALBUTEROL SULFATE 2.5 MG: 2.5 SOLUTION RESPIRATORY (INHALATION) at 10:59

## 2018-05-21 ENCOUNTER — HOSPITAL ENCOUNTER (OUTPATIENT)
Dept: INFUSION THERAPY | Age: 68
Discharge: HOME | End: 2018-05-21
Payer: MEDICARE

## 2018-05-21 ENCOUNTER — OFFICE VISIT (OUTPATIENT)
Dept: PULMONOLOGY | Age: 68
End: 2018-05-21
Payer: MEDICARE

## 2018-05-21 VITALS
BODY MASS INDEX: 25.52 KG/M2 | TEMPERATURE: 97.6 F | RESPIRATION RATE: 20 BRPM | DIASTOLIC BLOOD PRESSURE: 61 MMHG | HEIGHT: 60 IN | WEIGHT: 130 LBS | OXYGEN SATURATION: 97 % | HEART RATE: 74 BPM | SYSTOLIC BLOOD PRESSURE: 122 MMHG

## 2018-05-21 DIAGNOSIS — J84.10 PULMONARY FIBROSIS DETERMINED BY HIGH RESOLUTION COMPUTED TOMOGRAPHY (HCC): ICD-10-CM

## 2018-05-21 DIAGNOSIS — J47.9 BRONCHIECTASIS WITHOUT COMPLICATION (HCC): ICD-10-CM

## 2018-05-21 DIAGNOSIS — M41.9 RESTRICTIVE LUNG DISEASE DUE TO KYPHOSCOLIOSIS: ICD-10-CM

## 2018-05-21 DIAGNOSIS — J84.9 ILD (INTERSTITIAL LUNG DISEASE) (HCC): ICD-10-CM

## 2018-05-21 DIAGNOSIS — J96.12 CHRONIC HYPERCAPNIC RESPIRATORY FAILURE (HCC): Primary | ICD-10-CM

## 2018-05-21 DIAGNOSIS — J98.4 RESTRICTIVE LUNG DISEASE DUE TO KYPHOSCOLIOSIS: ICD-10-CM

## 2018-05-21 DIAGNOSIS — R05.3 CHRONIC COUGH: ICD-10-CM

## 2018-05-21 DIAGNOSIS — R09.82 POST-NASAL DRIP: ICD-10-CM

## 2018-05-21 DIAGNOSIS — M41.9 KYPHOSCOLIOSIS: ICD-10-CM

## 2018-05-21 DIAGNOSIS — J45.30 MILD PERSISTENT ASTHMA WITHOUT COMPLICATION: ICD-10-CM

## 2018-05-21 DIAGNOSIS — G47.33 OSA (OBSTRUCTIVE SLEEP APNEA): ICD-10-CM

## 2018-05-21 DIAGNOSIS — K21.9 GASTROESOPHAGEAL REFLUX DISEASE, ESOPHAGITIS PRESENCE NOT SPECIFIED: ICD-10-CM

## 2018-05-21 PROCEDURE — 6360000002 HC RX W HCPCS: Performed by: INTERNAL MEDICINE

## 2018-05-21 PROCEDURE — G0009 ADMIN PNEUMOCOCCAL VACCINE: HCPCS | Performed by: INTERNAL MEDICINE

## 2018-05-21 PROCEDURE — G8417 CALC BMI ABV UP PARAM F/U: HCPCS | Performed by: INTERNAL MEDICINE

## 2018-05-21 PROCEDURE — 3017F COLORECTAL CA SCREEN DOC REV: CPT | Performed by: INTERNAL MEDICINE

## 2018-05-21 PROCEDURE — 1036F TOBACCO NON-USER: CPT | Performed by: INTERNAL MEDICINE

## 2018-05-21 PROCEDURE — 90732 PPSV23 VACC 2 YRS+ SUBQ/IM: CPT | Performed by: INTERNAL MEDICINE

## 2018-05-21 PROCEDURE — 1090F PRES/ABSN URINE INCON ASSESS: CPT | Performed by: INTERNAL MEDICINE

## 2018-05-21 PROCEDURE — G8399 PT W/DXA RESULTS DOCUMENT: HCPCS | Performed by: INTERNAL MEDICINE

## 2018-05-21 PROCEDURE — 99215 OFFICE O/P EST HI 40 MIN: CPT | Performed by: INTERNAL MEDICINE

## 2018-05-21 PROCEDURE — 4040F PNEUMOC VAC/ADMIN/RCVD: CPT | Performed by: INTERNAL MEDICINE

## 2018-05-21 PROCEDURE — G8427 DOCREV CUR MEDS BY ELIG CLIN: HCPCS | Performed by: INTERNAL MEDICINE

## 2018-05-21 PROCEDURE — 1123F ACP DISCUSS/DSCN MKR DOCD: CPT | Performed by: INTERNAL MEDICINE

## 2018-05-21 RX ADMIN — PNEUMOCOCCAL VACCINE POLYVALENT 0.5 ML
25; 25; 25; 25; 25; 25; 25; 25; 25; 25; 25; 25; 25; 25; 25; 25; 25; 25; 25; 25; 25; 25; 25 INJECTION, SOLUTION INTRAMUSCULAR; SUBCUTANEOUS at 14:24

## 2018-05-21 NOTE — PLAN OF CARE
Problem: KNOWLEDGE DEFICIT  Goal: Patient/S.O. demonstrates understanding of disease process, treatment plan, medications, and discharge instructions.   Outcome: Completed Date Met: 05/21/18

## 2018-07-06 ENCOUNTER — HOSPITAL ENCOUNTER (OUTPATIENT)
Dept: NUTRITION | Age: 68
Discharge: HOME | End: 2018-07-06
Payer: MEDICARE

## 2018-07-06 VITALS — WEIGHT: 125 LBS | BODY MASS INDEX: 24.54 KG/M2 | HEIGHT: 60 IN

## 2018-07-06 PROCEDURE — 97802 MEDICAL NUTRITION INDIV IN: CPT

## 2018-07-06 NOTE — PROGRESS NOTES
MNT provided for moderate malnutrition    Unintended weight loss related to Compromised GI structure/function: chronic diarrhea as evidenced by Weight loss of 35#/21.9% x 1 year and Dx of moderate malnutrition    Client data    Ht: 5'0\" Wt: 125# BMI: 24.5 (normal)   Weight changes: 35# weight loss x 1 year ABW: n/a kg   BMR: 1034 calories Est. total calorie needs: ~7564-0098       Client overall goal for weight is for weight gain. Current eating pattern is consistent in meal timing, but in small portions. Use of whole grains, whole fruits and vegetables appear less than recommended quantities. Activity is limited due to oxygen and external temperatures. Client presents for MNT today with self. Flip Castellanos reports she has been following a gluten-free (most of the time-eats Progresso soups-\"I get along alright with it. \") and lactose-free diet. She was placed on a FODMAP diet by her gastroenterologist. She notes she \"is up 2#.\" States the diarrhea has not stopped and occurs daily. Diarrhea started after she had her gallbladder out in November of 2017. Frequency has remained constant. She does report the FODMAP diet has helped her GI Sx. She c/o a lot of \"gas\" though. Flip Castellanos reports she is on a probiotic and an antibiotic currently. She is drinking 2 ensure clear per day. C/o feeling full quickly, after a couple of bites. Acknowledges lactose-intolerance and uses lactose-free milk. Flip Castellanos denied any questions with the FODMAP diet or the gluten-free diet. Discussed and provided literature on:  I re-enforced the FODMAP and gluten-free diets. I encouraged her to utilize FODMAP friendly foods such as mayonnaise to provide condensed calories. I suggested consumption of 6 small meals per day and I recommended that she switch from Ensure Clear to a FODMAP-friendly supplement such as ProNourish.  I encouraged her to keep a food/Sx journal to document what she ate, diarrhea occurrences, bloating, gas, etc and also note how she is feeling better. Pt provided additional resources on FODMAP diet. I encouraged utilization of FODMAP and gluten-free friendly foods. While Jourdan Dutta was here, I called [address] [place] [address] and [employer], they were both unable to obtain the ProNourish supplement, but Tianna Rose were some recommended options for her to obtain it. .     Client goals:    1. Gain weight 1-2#/month. 2. Switch from Ensure Clear to ProNourish supplement 2 times per day. 3. Follow FODMAP and Gluten-Free diet plans. 4. Keep a food and Sx journal, documenting what she ate, GI Sx, and any improvements. Expected compliance:  good  Client appears to be in an action phase of change. Recommend follow up as needed. RD name and phone number provided. Thank you for the referral.    Electronically signed by Lavinia Koyanagi, RD, LD on 7/6/2018 at 10:53 AM    Education session duration: 35 minutes.

## 2018-11-19 ENCOUNTER — OFFICE VISIT (OUTPATIENT)
Dept: PULMONOLOGY | Age: 68
End: 2018-11-19
Payer: MEDICARE

## 2018-11-19 VITALS
HEIGHT: 60 IN | WEIGHT: 125 LBS | DIASTOLIC BLOOD PRESSURE: 70 MMHG | OXYGEN SATURATION: 95 % | SYSTOLIC BLOOD PRESSURE: 121 MMHG | HEART RATE: 74 BPM | BODY MASS INDEX: 24.54 KG/M2 | RESPIRATION RATE: 20 BRPM | TEMPERATURE: 98.8 F

## 2018-11-19 DIAGNOSIS — J84.9 ILD (INTERSTITIAL LUNG DISEASE) (HCC): ICD-10-CM

## 2018-11-19 DIAGNOSIS — J45.30 MILD PERSISTENT ASTHMA WITHOUT COMPLICATION: ICD-10-CM

## 2018-11-19 DIAGNOSIS — M41.9 RESTRICTIVE LUNG DISEASE DUE TO KYPHOSCOLIOSIS: ICD-10-CM

## 2018-11-19 DIAGNOSIS — J98.4 RESTRICTIVE LUNG DISEASE DUE TO KYPHOSCOLIOSIS: ICD-10-CM

## 2018-11-19 DIAGNOSIS — G47.33 OSA (OBSTRUCTIVE SLEEP APNEA): ICD-10-CM

## 2018-11-19 DIAGNOSIS — R09.82 POST-NASAL DRIP: ICD-10-CM

## 2018-11-19 DIAGNOSIS — K21.9 GASTROESOPHAGEAL REFLUX DISEASE, ESOPHAGITIS PRESENCE NOT SPECIFIED: ICD-10-CM

## 2018-11-19 DIAGNOSIS — M41.9 KYPHOSCOLIOSIS: ICD-10-CM

## 2018-11-19 DIAGNOSIS — J47.9 BRONCHIECTASIS WITHOUT COMPLICATION (HCC): ICD-10-CM

## 2018-11-19 DIAGNOSIS — J96.12 CHRONIC HYPERCAPNIC RESPIRATORY FAILURE (HCC): Primary | ICD-10-CM

## 2018-11-19 PROCEDURE — 1090F PRES/ABSN URINE INCON ASSESS: CPT | Performed by: INTERNAL MEDICINE

## 2018-11-19 PROCEDURE — G8399 PT W/DXA RESULTS DOCUMENT: HCPCS | Performed by: INTERNAL MEDICINE

## 2018-11-19 PROCEDURE — 4040F PNEUMOC VAC/ADMIN/RCVD: CPT | Performed by: INTERNAL MEDICINE

## 2018-11-19 PROCEDURE — 99214 OFFICE O/P EST MOD 30 MIN: CPT | Performed by: INTERNAL MEDICINE

## 2018-11-19 PROCEDURE — G8482 FLU IMMUNIZE ORDER/ADMIN: HCPCS | Performed by: INTERNAL MEDICINE

## 2018-11-19 PROCEDURE — 1036F TOBACCO NON-USER: CPT | Performed by: INTERNAL MEDICINE

## 2018-11-19 PROCEDURE — 1101F PT FALLS ASSESS-DOCD LE1/YR: CPT | Performed by: INTERNAL MEDICINE

## 2018-11-19 PROCEDURE — 1123F ACP DISCUSS/DSCN MKR DOCD: CPT | Performed by: INTERNAL MEDICINE

## 2018-11-19 PROCEDURE — G8427 DOCREV CUR MEDS BY ELIG CLIN: HCPCS | Performed by: INTERNAL MEDICINE

## 2018-11-19 PROCEDURE — G8420 CALC BMI NORM PARAMETERS: HCPCS | Performed by: INTERNAL MEDICINE

## 2018-11-19 PROCEDURE — 3017F COLORECTAL CA SCREEN DOC REV: CPT | Performed by: INTERNAL MEDICINE

## 2019-02-13 PROBLEM — A41.9 SEPSIS (HCC): Status: RESOLVED | Noted: 2017-08-07 | Resolved: 2019-02-13

## 2019-02-13 PROBLEM — N30.00 ACUTE CYSTITIS: Status: RESOLVED | Noted: 2017-11-02 | Resolved: 2019-02-13

## 2019-02-13 PROBLEM — A41.9 SEPSIS (HCC): Status: RESOLVED | Noted: 2017-11-02 | Resolved: 2019-02-13

## 2019-02-13 PROBLEM — E83.42 HYPOMAGNESEMIA: Status: RESOLVED | Noted: 2017-11-02 | Resolved: 2019-02-13

## 2019-02-13 PROBLEM — R19.7 DIARRHEA: Status: RESOLVED | Noted: 2018-02-02 | Resolved: 2019-02-13

## 2019-02-13 PROBLEM — J96.22 ACUTE ON CHRONIC RESPIRATORY FAILURE WITH HYPOXIA AND HYPERCAPNIA (HCC): Status: RESOLVED | Noted: 2017-04-10 | Resolved: 2019-02-13

## 2019-02-13 PROBLEM — R63.4 WEIGHT LOSS: Status: RESOLVED | Noted: 2017-01-13 | Resolved: 2019-02-13

## 2019-02-13 PROBLEM — J96.21 ACUTE ON CHRONIC RESPIRATORY FAILURE WITH HYPOXIA AND HYPERCAPNIA (HCC): Status: RESOLVED | Noted: 2017-04-10 | Resolved: 2019-02-13

## 2019-02-13 PROBLEM — J18.9 CAP (COMMUNITY ACQUIRED PNEUMONIA): Status: RESOLVED | Noted: 2017-04-07 | Resolved: 2019-02-13

## 2019-02-13 PROBLEM — R10.13 EPIGASTRIC PAIN: Status: RESOLVED | Noted: 2017-01-13 | Resolved: 2019-02-13

## 2019-04-10 ENCOUNTER — HOSPITAL ENCOUNTER (OUTPATIENT)
Dept: WOMENS IMAGING | Age: 69
Discharge: HOME | End: 2019-04-12
Payer: MEDICARE

## 2019-04-10 DIAGNOSIS — Z12.31 VISIT FOR SCREENING MAMMOGRAM: ICD-10-CM

## 2019-04-10 PROCEDURE — 77063 BREAST TOMOSYNTHESIS BI: CPT

## 2019-05-06 ENCOUNTER — OFFICE VISIT (OUTPATIENT)
Dept: PULMONOLOGY | Age: 69
End: 2019-05-06
Payer: MEDICARE

## 2019-05-06 VITALS
DIASTOLIC BLOOD PRESSURE: 61 MMHG | HEART RATE: 101 BPM | SYSTOLIC BLOOD PRESSURE: 118 MMHG | OXYGEN SATURATION: 88 % | WEIGHT: 117.5 LBS | BODY MASS INDEX: 23.07 KG/M2 | HEIGHT: 60 IN | RESPIRATION RATE: 24 BRPM | TEMPERATURE: 99 F

## 2019-05-06 DIAGNOSIS — J47.9 BRONCHIECTASIS WITHOUT COMPLICATION (HCC): ICD-10-CM

## 2019-05-06 DIAGNOSIS — M41.9 KYPHOSCOLIOSIS: ICD-10-CM

## 2019-05-06 DIAGNOSIS — J96.12 CHRONIC HYPERCAPNIC RESPIRATORY FAILURE (HCC): Primary | ICD-10-CM

## 2019-05-06 DIAGNOSIS — G47.33 OSA (OBSTRUCTIVE SLEEP APNEA): ICD-10-CM

## 2019-05-06 DIAGNOSIS — R09.82 POST-NASAL DRIP: ICD-10-CM

## 2019-05-06 DIAGNOSIS — M41.9 RESTRICTIVE LUNG DISEASE DUE TO KYPHOSCOLIOSIS: ICD-10-CM

## 2019-05-06 DIAGNOSIS — J84.9 ILD (INTERSTITIAL LUNG DISEASE) (HCC): ICD-10-CM

## 2019-05-06 DIAGNOSIS — K21.9 GASTROESOPHAGEAL REFLUX DISEASE, ESOPHAGITIS PRESENCE NOT SPECIFIED: ICD-10-CM

## 2019-05-06 DIAGNOSIS — J98.4 RESTRICTIVE LUNG DISEASE DUE TO KYPHOSCOLIOSIS: ICD-10-CM

## 2019-05-06 DIAGNOSIS — J45.30 MILD PERSISTENT ASTHMA WITHOUT COMPLICATION: ICD-10-CM

## 2019-05-06 PROCEDURE — 3017F COLORECTAL CA SCREEN DOC REV: CPT | Performed by: INTERNAL MEDICINE

## 2019-05-06 PROCEDURE — G8427 DOCREV CUR MEDS BY ELIG CLIN: HCPCS | Performed by: INTERNAL MEDICINE

## 2019-05-06 PROCEDURE — 1090F PRES/ABSN URINE INCON ASSESS: CPT | Performed by: INTERNAL MEDICINE

## 2019-05-06 PROCEDURE — 1036F TOBACCO NON-USER: CPT | Performed by: INTERNAL MEDICINE

## 2019-05-06 PROCEDURE — G8420 CALC BMI NORM PARAMETERS: HCPCS | Performed by: INTERNAL MEDICINE

## 2019-05-06 PROCEDURE — 4040F PNEUMOC VAC/ADMIN/RCVD: CPT | Performed by: INTERNAL MEDICINE

## 2019-05-06 PROCEDURE — 99214 OFFICE O/P EST MOD 30 MIN: CPT | Performed by: INTERNAL MEDICINE

## 2019-05-06 PROCEDURE — G8399 PT W/DXA RESULTS DOCUMENT: HCPCS | Performed by: INTERNAL MEDICINE

## 2019-05-06 PROCEDURE — 1123F ACP DISCUSS/DSCN MKR DOCD: CPT | Performed by: INTERNAL MEDICINE

## 2019-05-07 NOTE — PROGRESS NOTES
except as mentioned above were negative  Breast ROS:  Completed and except as mentioned above were negative  Respiratory ROS:  Completed and except as mentioned above were negative  Cardiovascular ROS:  Completed and except as mentioned above were negative  Gastrointestinal ROS: Completed and except as mentioned above were negative  Genito-Urinary ROS:  Completed and except as mentioned above were negative  Musculoskeletal ROS:  Completed and except as mentioned above were negative  Neurological ROS:  Completed and except as mentioned above were negative  Dermatological ROS:  Completed and except as mentioned above were negative    Allergies:   Allergies   Allergen Reactions    Lactose Intolerance (Gi)        Medications:    Current Outpatient Medications:     sertraline (ZOLOFT) 50 MG tablet, Take 1 tablet by mouth daily, Disp: 30 tablet, Rfl: 11    albuterol (PROVENTIL) (2.5 MG/3ML) 0.083% nebulizer solution, Take 3 mLs by nebulization every 6 hours as needed for Wheezing Nebulizer treatment, Disp: 120 each, Rfl: 3    levothyroxine (SYNTHROID) 200 MCG tablet, Take 1 tablet by mouth Daily, Disp: 90 tablet, Rfl: 3    pantoprazole (PROTONIX) 40 MG tablet, Take 1 tablet by mouth daily, Disp: 30 tablet, Rfl: 2    ondansetron (ZOFRAN) 4 MG tablet, Take 1 tablet by mouth 3 times daily as needed for Nausea or Vomiting, Disp: 10 tablet, Rfl: 0    lactobacillus (CULTURELLE) capsule, Take 1 capsule by mouth daily (with breakfast), Disp: 30 capsule, Rfl:     mometasone-formoterol (DULERA) 200-5 MCG/ACT inhaler, Inhale 2 puffs into the lungs 2 times daily, Disp: , Rfl:     fluticasone (FLONASE) 50 MCG/ACT nasal spray, 1 spray by Nasal route daily, Disp: 1 Bottle, Rfl: 3    Calcium Carbonate-Vitamin D (OYSTER SHELL CALCIUM/D) 500-200 MG-UNIT TABS, 2 tabs daily, Disp: 30 tablet, Rfl: 0    hydroxychloroquine (PLAQUENIL) 200 MG tablet, take 1 and 1/2 tablets by mouth once daily, Disp: , Rfl: 0      Objective:    Physical Exam:  Vitals: /61 (Site: Right Upper Arm, Position: Sitting, Cuff Size: Medium Adult)   Pulse 101   Temp 99 °F (37.2 °C) (Tympanic)   Resp 24   Ht 5' (1.524 m)   Wt 117 lb 8 oz (53.3 kg)   SpO2 (!) 88% Comment: Pt remains with O2@ 3LPM-NC. BMI 22.95 kg/m²   Last 3 weights: Wt Readings from Last 3 Encounters:   05/06/19 117 lb 8 oz (53.3 kg)   02/13/19 128 lb (58.1 kg)   01/08/19 125 lb (56.7 kg)     Body mass index is 22.95 kg/m². PHYSICAL EXAMINATION:  Vitals:    05/06/19 1020 05/06/19 1026   BP: 118/61    Site: Right Upper Arm    Position: Sitting    Cuff Size: Medium Adult    Pulse: 101    Resp: 24    Temp: 99 °F (37.2 °C)    TempSrc: Tympanic    SpO2: (!) 88% (!) 88%   Weight: 117 lb 8 oz (53.3 kg)    Height: 5' (1.524 m)      Constitutional: This is a well developed, well nourished, 25-29.9 - Overweight 81y.o. year old female who is alert, oriented, cooperative and in no apparent distress. Head:normocephalic and atraumatic. EENT: COLETTE. No conjunctival injections. Septum was midline, mucosa was without erythema, exudates or cobblestoning. No thrush was noted. Mallampati  II (soft palate, uvula, fauces visible)  Neck: Supple without thyromegaly. No elevated JVP. Trachea was midline. Respiratory: Chest was symmetrical without dullness to percussion. Breath sounds bilaterally were clear to auscultation. There were no wheezes, rhonchi , But has bibasilar rales. There is no intercostal retraction or use of accessory muscles. No egophony noted. Cardiovascular: Regular without murmur, clicks, gallops or rubs. Abdomen: Slightly rounded and soft without organomegaly. No rebound, rigidity or guarding was appreciated. Lymphatic: No lymphadenopathy. Musculoskeletal: Has kyphoscoliosis of the dorsal spine. No gross muscle weakness. Extremities:  No lower extremity edema, ulcerations, tenderness, varicosities or erythema.   Muscle size, tone and strength are normal.  No involuntary concerns.       Ina Felder MD             5/7/2019, 11:56 AM

## 2019-07-16 PROBLEM — E44.0 MODERATE MALNUTRITION (HCC): Status: RESOLVED | Noted: 2017-08-07 | Resolved: 2019-07-16

## 2019-07-16 PROBLEM — J18.9 COMMUNITY ACQUIRED PNEUMONIA OF RIGHT LUNG: Status: RESOLVED | Noted: 2017-11-02 | Resolved: 2019-07-16

## 2019-08-05 ENCOUNTER — TELEPHONE (OUTPATIENT)
Dept: PULMONOLOGY | Age: 69
End: 2019-08-05

## 2019-08-07 DIAGNOSIS — J84.9 ILD (INTERSTITIAL LUNG DISEASE) (HCC): Primary | ICD-10-CM

## 2019-08-07 DIAGNOSIS — J96.12 CHRONIC HYPERCAPNIC RESPIRATORY FAILURE (HCC): ICD-10-CM

## 2019-08-21 ENCOUNTER — HOSPITAL ENCOUNTER (OUTPATIENT)
Dept: CARDIAC REHAB | Age: 69
Setting detail: THERAPIES SERIES
Discharge: HOME | End: 2019-08-21
Payer: MEDICARE

## 2019-08-21 VITALS
OXYGEN SATURATION: 97 % | RESPIRATION RATE: 18 BRPM | HEIGHT: 60 IN | HEART RATE: 92 BPM | BODY MASS INDEX: 23.16 KG/M2 | SYSTOLIC BLOOD PRESSURE: 122 MMHG | DIASTOLIC BLOOD PRESSURE: 72 MMHG | WEIGHT: 118 LBS

## 2019-08-21 PROCEDURE — G0424 PULMONARY REHAB W EXER: HCPCS

## 2019-08-21 NOTE — PROGRESS NOTES
optimal management. · [place] with chronic health changes. · Manage stress by utilizing relaxation techniques. · Increased knowledge of health eating. · Improve/Maintain quality of life. · Manage/Control blood pressure.

## 2019-08-21 NOTE — PROGRESS NOTES
Pulmonary Rehab Individualized Treatment Plan-Initial     Patient Name: Barbara Diallo  Date of Initial Assessment: 8/21/2019  ACCOUNT #: [de-identified]  Diagnosis: Chronic Interstitial Lung disease   Date of last  Visit 8/7/19  Onset Date: 8/7/19  Referring Physician: Linda High. Ruben  Risk Stratification: moderate  Session Number: 1   EXERCISE    Stages of Change:   [] pre-contemplation   [x] Action   [] Contemplate   [] Maintainence   [] Prep   [] Relapse      [x] 6-MWT       Walked ft: 528  Max HR: 130  O2: 4-6lpm  RPE: 13  SPO2: 92%-99%  MET Level: 1.8             Exercise Prescription:  Mode: [x] TM [x] B [x] STP [] EL [x] R  Frequency: 3 times weekly  Duration: 31-60 minutes  Intensity: 1.8 METS  Progression:  30-50min total exercise by increasing 1-2 level/wk and/or 1-2min/wk to maintain SaO2 > 90%, achieve THR and RPE 11-13.   SPO2: >90%  [x] O2 - Liters: 3-6 lpm  [] Resistance Training Weights (lbs):   Reps:      Hypertension:  [] Yes  [x] No  Resting BP: 112/76  Peak Exercise BP: 142/84  BP Meds: none    Intervention:  Home Exercise:  Type: waling  Duration: 30 minutes  Frequency: daily  O2 (L/min): 3-6 lpm   [] Resistance Training    Education:   [] Equipment Kingsley  [x] PLB     [] Ex Safety   [x] S/S to report    [] Warm up/ Cool down  [x] O2 Therapy    [x] RPE Scale   [] Energy conservation   [x] Dyspnea scale  [] Exercise specialist class-Home Exercise         Target Goal:   - SPO2 > 90 during exercise  - Individual exercise Rx      Nutrition    Stages of Change:   [] pre-contemplation   [x] Action   [] Contemplate   [] Maintainence   [] Prep   [] Relapse     Diabetes:  [] Yes  [x] No  BS:   HbA1c:  Diabetes Medication:  [] Monitor BS at home     Weight Management:  Last Weight: 118.2  Height: 5'0\"  BMI: 23.1  Wt Goal:   Alcohol:  [] daily   [] weekly  [x] special  [] none   Type:   Amount:    Diet Assessment Tool: Food Diary  Dietary Goal:     [] Diet Class           [] Referred to Diabetes presence or absence of depression using a valid screening tool. -Maximize coping skills.  -Positive support system. Fall Risk assess: [x] Yes  [] No  Assistive Device:   [] Cane  [x] Walker [] Wheel Chair  [] Gait belt    Patient/Program goal:   · Increased stamina/strength to 30-50min total exercise by increasing 1-2 level/wk and/or 1-2min/wk to maintain SaO2 > 90%, achieve THR and RPE 11-13. Pt will increase speed & time as pt tolerates. · Introduce weights/ therabands 1-2# for 5-10 reps. Staff will introduce at a later session. · Less SOB on exertion by utilizing pursed-lip breathing. PLB instructed/demonstrated to pt. · Proper use of inhalers, O2 therapy and meds. Reviewed all medications with pt. All questions answered. · Increased knowledge of COPD and optimal management. Education continues. · [place] with chronic health changes. Education continues  · Manage stress by utilizing relaxation techniques. Deep breathing & PLB instructed/demonstrated to pt. · Increased knowledge of health eating. Rate your plate & food diary completed and sent to Dietician for review & recommendations. · Improve/Maintain quality of life. Maintained at current. · Manage/Control blood pressure.   BP remains WNL    Physician Changes/Comments:      Pulmonary Rehab Staff

## 2019-08-22 ENCOUNTER — HOSPITAL ENCOUNTER (OUTPATIENT)
Dept: CARDIAC REHAB | Age: 69
Setting detail: THERAPIES SERIES
Discharge: HOME | End: 2019-08-22
Payer: MEDICARE

## 2019-08-22 PROCEDURE — G0424 PULMONARY REHAB W EXER: HCPCS

## 2019-08-26 ENCOUNTER — HOSPITAL ENCOUNTER (OUTPATIENT)
Dept: CARDIAC REHAB | Age: 69
Setting detail: THERAPIES SERIES
Discharge: HOME | End: 2019-08-26
Payer: MEDICARE

## 2019-08-26 PROCEDURE — G0424 PULMONARY REHAB W EXER: HCPCS

## 2019-08-27 ENCOUNTER — APPOINTMENT (OUTPATIENT)
Dept: CT IMAGING | Age: 69
DRG: 193 | End: 2019-08-27
Payer: MEDICARE

## 2019-08-27 ENCOUNTER — HOSPITAL ENCOUNTER (INPATIENT)
Age: 69
LOS: 3 days | Discharge: HOME | DRG: 193 | End: 2019-08-30
Attending: EMERGENCY MEDICINE | Admitting: FAMILY MEDICINE
Payer: MEDICARE

## 2019-08-27 DIAGNOSIS — J84.10 PULMONARY FIBROSIS (HCC): ICD-10-CM

## 2019-08-27 DIAGNOSIS — J18.9 MULTIFOCAL PNEUMONIA: Primary | ICD-10-CM

## 2019-08-27 LAB
ABSOLUTE EOS #: 0.53 K/UL (ref 0–0.44)
ABSOLUTE IMMATURE GRANULOCYTE: <0.03 K/UL (ref 0–0.3)
ABSOLUTE LYMPH #: 1.16 K/UL (ref 1.1–3.7)
ABSOLUTE MONO #: 0.73 K/UL (ref 0.1–1.2)
ALBUMIN SERPL-MCNC: 3.3 G/DL (ref 3.5–5.2)
ALBUMIN/GLOBULIN RATIO: 0.7 (ref 1–2.5)
ALP BLD-CCNC: 70 U/L (ref 35–104)
ALT SERPL-CCNC: 7 U/L (ref 5–33)
ANION GAP SERPL CALCULATED.3IONS-SCNC: 10 MMOL/L (ref 9–17)
AST SERPL-CCNC: 14 U/L
BASOPHILS # BLD: 1 % (ref 0–2)
BASOPHILS ABSOLUTE: 0.04 K/UL (ref 0–0.2)
BILIRUB SERPL-MCNC: 0.22 MG/DL (ref 0.3–1.2)
BNP INTERPRETATION: NORMAL
BUN BLDV-MCNC: 11 MG/DL (ref 8–23)
BUN/CREAT BLD: 17 (ref 9–20)
CALCIUM SERPL-MCNC: 9 MG/DL (ref 8.6–10.4)
CHLORIDE BLD-SCNC: 96 MMOL/L (ref 98–107)
CO2: 33 MMOL/L (ref 20–31)
CREAT SERPL-MCNC: 0.65 MG/DL (ref 0.5–0.9)
DIFFERENTIAL TYPE: ABNORMAL
EKG ATRIAL RATE: 107 BPM
EKG P AXIS: 54 DEGREES
EKG P-R INTERVAL: 132 MS
EKG Q-T INTERVAL: 326 MS
EKG QRS DURATION: 76 MS
EKG QTC CALCULATION (BAZETT): 435 MS
EKG R AXIS: 58 DEGREES
EKG T AXIS: 26 DEGREES
EKG VENTRICULAR RATE: 107 BPM
EOSINOPHILS RELATIVE PERCENT: 7 % (ref 1–4)
GFR AFRICAN AMERICAN: >60 ML/MIN
GFR NON-AFRICAN AMERICAN: >60 ML/MIN
GFR SERPL CREATININE-BSD FRML MDRD: ABNORMAL ML/MIN/{1.73_M2}
GFR SERPL CREATININE-BSD FRML MDRD: ABNORMAL ML/MIN/{1.73_M2}
GLUCOSE BLD-MCNC: 138 MG/DL (ref 70–99)
HCT VFR BLD CALC: 38.8 % (ref 36.3–47.1)
HEMOGLOBIN: 12.1 G/DL (ref 11.9–15.1)
IMMATURE GRANULOCYTES: 0 %
INR BLD: 1 (ref 0.9–1.2)
LACTIC ACID, SEPSIS WHOLE BLOOD: NORMAL MMOL/L (ref 0.5–1.9)
LACTIC ACID, SEPSIS WHOLE BLOOD: NORMAL MMOL/L (ref 0.5–1.9)
LACTIC ACID, SEPSIS: 0.6 MMOL/L (ref 0.5–1.9)
LACTIC ACID, SEPSIS: 1.8 MMOL/L (ref 0.5–1.9)
LIPASE: 40 U/L (ref 13–60)
LYMPHOCYTES # BLD: 14 % (ref 24–43)
MCH RBC QN AUTO: 29.7 PG (ref 25.2–33.5)
MCHC RBC AUTO-ENTMCNC: 31.2 G/DL (ref 28.4–34.8)
MCV RBC AUTO: 95.1 FL (ref 82.6–102.9)
MONOCYTES # BLD: 9 % (ref 3–12)
NRBC AUTOMATED: 0 PER 100 WBC
PARTIAL THROMBOPLASTIN TIME: 25.2 SEC (ref 23.2–34.4)
PDW BLD-RTO: 13.2 % (ref 11.8–14.4)
PLATELET # BLD: 240 K/UL (ref 138–453)
PLATELET ESTIMATE: ABNORMAL
PMV BLD AUTO: 9.7 FL (ref 8.1–13.5)
POTASSIUM SERPL-SCNC: 4.2 MMOL/L (ref 3.7–5.3)
PRO-BNP: 172 PG/ML
PROTHROMBIN TIME: 10.6 SEC (ref 9.7–12.2)
RBC # BLD: 4.08 M/UL (ref 3.95–5.11)
RBC # BLD: ABNORMAL 10*6/UL
SEG NEUTROPHILS: 69 % (ref 36–65)
SEGMENTED NEUTROPHILS ABSOLUTE COUNT: 5.65 K/UL (ref 1.5–8.1)
SODIUM BLD-SCNC: 139 MMOL/L (ref 135–144)
TOTAL PROTEIN: 8.2 G/DL (ref 6.4–8.3)
TROPONIN INTERP: NORMAL
TROPONIN T: <0.03 NG/ML
TROPONIN, HIGH SENSITIVITY: NORMAL NG/L (ref 0–14)
WBC # BLD: 8.1 K/UL (ref 3.5–11.3)
WBC # BLD: ABNORMAL 10*3/UL

## 2019-08-27 PROCEDURE — 6360000004 HC RX CONTRAST MEDICATION: Performed by: EMERGENCY MEDICINE

## 2019-08-27 PROCEDURE — 85025 COMPLETE CBC W/AUTO DIFF WBC: CPT

## 2019-08-27 PROCEDURE — 96367 TX/PROPH/DG ADDL SEQ IV INF: CPT

## 2019-08-27 PROCEDURE — 94664 DEMO&/EVAL PT USE INHALER: CPT

## 2019-08-27 PROCEDURE — 96365 THER/PROPH/DIAG IV INF INIT: CPT

## 2019-08-27 PROCEDURE — 84484 ASSAY OF TROPONIN QUANT: CPT

## 2019-08-27 PROCEDURE — 36415 COLL VENOUS BLD VENIPUNCTURE: CPT

## 2019-08-27 PROCEDURE — 93010 ELECTROCARDIOGRAM REPORT: CPT | Performed by: INTERNAL MEDICINE

## 2019-08-27 PROCEDURE — 87040 BLOOD CULTURE FOR BACTERIA: CPT

## 2019-08-27 PROCEDURE — 2000000000 HC ICU R&B

## 2019-08-27 PROCEDURE — 83690 ASSAY OF LIPASE: CPT

## 2019-08-27 PROCEDURE — 83880 ASSAY OF NATRIURETIC PEPTIDE: CPT

## 2019-08-27 PROCEDURE — 6370000000 HC RX 637 (ALT 250 FOR IP): Performed by: EMERGENCY MEDICINE

## 2019-08-27 PROCEDURE — 80053 COMPREHEN METABOLIC PANEL: CPT

## 2019-08-27 PROCEDURE — 96366 THER/PROPH/DIAG IV INF ADDON: CPT

## 2019-08-27 PROCEDURE — 82805 BLOOD GASES W/O2 SATURATION: CPT

## 2019-08-27 PROCEDURE — 93005 ELECTROCARDIOGRAM TRACING: CPT | Performed by: EMERGENCY MEDICINE

## 2019-08-27 PROCEDURE — 99285 EMERGENCY DEPT VISIT HI MDM: CPT

## 2019-08-27 PROCEDURE — 6360000002 HC RX W HCPCS: Performed by: EMERGENCY MEDICINE

## 2019-08-27 PROCEDURE — 94660 CPAP INITIATION&MGMT: CPT

## 2019-08-27 PROCEDURE — 94640 AIRWAY INHALATION TREATMENT: CPT

## 2019-08-27 PROCEDURE — 71260 CT THORAX DX C+: CPT

## 2019-08-27 PROCEDURE — 83605 ASSAY OF LACTIC ACID: CPT

## 2019-08-27 PROCEDURE — 2580000003 HC RX 258: Performed by: EMERGENCY MEDICINE

## 2019-08-27 PROCEDURE — 85610 PROTHROMBIN TIME: CPT

## 2019-08-27 PROCEDURE — 85730 THROMBOPLASTIN TIME PARTIAL: CPT

## 2019-08-27 PROCEDURE — 87086 URINE CULTURE/COLONY COUNT: CPT

## 2019-08-27 RX ORDER — ALBUTEROL SULFATE 2.5 MG/3ML
SOLUTION RESPIRATORY (INHALATION)
Status: DISPENSED
Start: 2019-08-27 | End: 2019-08-28

## 2019-08-27 RX ORDER — ALBUTEROL SULFATE 2.5 MG/3ML
7.5 SOLUTION RESPIRATORY (INHALATION) ONCE
Status: COMPLETED | OUTPATIENT
Start: 2019-08-27 | End: 2019-08-27

## 2019-08-27 RX ORDER — MAGNESIUM SULFATE IN WATER 40 MG/ML
2 INJECTION, SOLUTION INTRAVENOUS ONCE
Status: COMPLETED | OUTPATIENT
Start: 2019-08-27 | End: 2019-08-27

## 2019-08-27 RX ORDER — 0.9 % SODIUM CHLORIDE 0.9 %
1000 INTRAVENOUS SOLUTION INTRAVENOUS ONCE
Status: COMPLETED | OUTPATIENT
Start: 2019-08-27 | End: 2019-08-28

## 2019-08-27 RX ORDER — ONDANSETRON 2 MG/ML
4 INJECTION INTRAMUSCULAR; INTRAVENOUS EVERY 6 HOURS PRN
Status: DISCONTINUED | OUTPATIENT
Start: 2019-08-27 | End: 2019-08-30 | Stop reason: HOSPADM

## 2019-08-27 RX ORDER — IPRATROPIUM BROMIDE AND ALBUTEROL SULFATE 2.5; .5 MG/3ML; MG/3ML
SOLUTION RESPIRATORY (INHALATION)
Status: COMPLETED
Start: 2019-08-27 | End: 2019-08-28

## 2019-08-27 RX ORDER — SODIUM CHLORIDE 0.9 % (FLUSH) 0.9 %
10 SYRINGE (ML) INJECTION EVERY 12 HOURS SCHEDULED
Status: DISCONTINUED | OUTPATIENT
Start: 2019-08-27 | End: 2019-08-30 | Stop reason: HOSPADM

## 2019-08-27 RX ORDER — FAMOTIDINE 20 MG/1
20 TABLET, FILM COATED ORAL 2 TIMES DAILY
Status: DISCONTINUED | OUTPATIENT
Start: 2019-08-28 | End: 2019-08-30 | Stop reason: HOSPADM

## 2019-08-27 RX ORDER — SODIUM CHLORIDE 0.9 % (FLUSH) 0.9 %
10 SYRINGE (ML) INJECTION PRN
Status: DISCONTINUED | OUTPATIENT
Start: 2019-08-27 | End: 2019-08-30 | Stop reason: HOSPADM

## 2019-08-27 RX ORDER — ACETAMINOPHEN 325 MG/1
650 TABLET ORAL EVERY 4 HOURS PRN
Status: DISCONTINUED | OUTPATIENT
Start: 2019-08-27 | End: 2019-08-30 | Stop reason: HOSPADM

## 2019-08-27 RX ORDER — IPRATROPIUM BROMIDE AND ALBUTEROL SULFATE 2.5; .5 MG/3ML; MG/3ML
1 SOLUTION RESPIRATORY (INHALATION)
Status: DISCONTINUED | OUTPATIENT
Start: 2019-08-28 | End: 2019-08-28

## 2019-08-27 RX ORDER — SODIUM CHLORIDE 9 MG/ML
INJECTION, SOLUTION INTRAVENOUS CONTINUOUS
Status: DISCONTINUED | OUTPATIENT
Start: 2019-08-28 | End: 2019-08-30 | Stop reason: HOSPADM

## 2019-08-27 RX ORDER — IPRATROPIUM BROMIDE AND ALBUTEROL SULFATE 2.5; .5 MG/3ML; MG/3ML
1 SOLUTION RESPIRATORY (INHALATION)
Status: DISCONTINUED | OUTPATIENT
Start: 2019-08-27 | End: 2019-08-27

## 2019-08-27 RX ORDER — IPRATROPIUM BROMIDE AND ALBUTEROL SULFATE 2.5; .5 MG/3ML; MG/3ML
1 SOLUTION RESPIRATORY (INHALATION) ONCE
Status: COMPLETED | OUTPATIENT
Start: 2019-08-27 | End: 2019-08-27

## 2019-08-27 RX ORDER — 0.9 % SODIUM CHLORIDE 0.9 %
500 INTRAVENOUS SOLUTION INTRAVENOUS ONCE
Status: DISCONTINUED | OUTPATIENT
Start: 2019-08-27 | End: 2019-08-27

## 2019-08-27 RX ORDER — SODIUM CHLORIDE 0.9 % (FLUSH) 0.9 %
10 SYRINGE (ML) INJECTION EVERY 12 HOURS SCHEDULED
Status: DISCONTINUED | OUTPATIENT
Start: 2019-08-28 | End: 2019-08-30 | Stop reason: HOSPADM

## 2019-08-27 RX ADMIN — IOPAMIDOL 75 ML: 755 INJECTION, SOLUTION INTRAVENOUS at 20:46

## 2019-08-27 RX ADMIN — MAGNESIUM SULFATE HEPTAHYDRATE 2 G: 40 INJECTION, SOLUTION INTRAVENOUS at 19:45

## 2019-08-27 RX ADMIN — VANCOMYCIN HYDROCHLORIDE 1000 MG: 1 INJECTION, POWDER, LYOPHILIZED, FOR SOLUTION INTRAVENOUS at 22:42

## 2019-08-27 RX ADMIN — SODIUM CHLORIDE 1000 ML: 9 INJECTION, SOLUTION INTRAVENOUS at 22:07

## 2019-08-27 RX ADMIN — PIPERACILLIN SODIUM,TAZOBACTAM SODIUM 3.38 G: 3; .375 INJECTION, POWDER, FOR SOLUTION INTRAVENOUS at 22:07

## 2019-08-27 RX ADMIN — IPRATROPIUM BROMIDE AND ALBUTEROL SULFATE 1 AMPULE: .5; 3 SOLUTION RESPIRATORY (INHALATION) at 19:47

## 2019-08-27 RX ADMIN — ALBUTEROL SULFATE 7.5 MG: 2.5 SOLUTION RESPIRATORY (INHALATION) at 22:26

## 2019-08-27 ASSESSMENT — ENCOUNTER SYMPTOMS
BACK PAIN: 0
NAUSEA: 0
CHEST TIGHTNESS: 1
SHORTNESS OF BREATH: 1
RHINORRHEA: 0
WHEEZING: 0
ABDOMINAL PAIN: 0
COUGH: 1
COLOR CHANGE: 0
VOMITING: 0

## 2019-08-27 ASSESSMENT — PAIN DESCRIPTION - PAIN TYPE: TYPE: ACUTE PAIN

## 2019-08-27 ASSESSMENT — PAIN DESCRIPTION - DESCRIPTORS: DESCRIPTORS: ACHING

## 2019-08-27 ASSESSMENT — PAIN SCALES - GENERAL
PAINLEVEL_OUTOF10: 5
PAINLEVEL_OUTOF10: 0

## 2019-08-27 ASSESSMENT — PAIN DESCRIPTION - LOCATION: LOCATION: HEAD

## 2019-08-27 NOTE — ED PROVIDER NOTES
 Highest education level: Not on file   Occupational History    Not on file   Social Needs    Financial resource strain: Not hard at all    Food insecurity:     Worry: Never true     Inability: Never true   Collect.it needs:     Medical: No     Non-medical: No   Tobacco Use    Smoking status: Never Smoker    Smokeless tobacco: Never Used   Substance and Sexual Activity    Alcohol use: Yes     Comment: occ    Drug use: No    Sexual activity: Not on file   Lifestyle    Physical activity:     Days per week: Not on file     Minutes per session: Not on file    Stress: Not on file   Relationships    Social connections:     Talks on phone: Not on file     Gets together: Not on file     Attends Jain service: Not on file     Active member of club or organization: Not on file     Attends meetings of clubs or organizations: Not on file     Relationship status: Not on file    Intimate partner violence:     Fear of current or ex partner: Not on file     Emotionally abused: Not on file     Physically abused: Not on file     Forced sexual activity: Not on file   Other Topics Concern    Not on file   Social History Narrative    Not on file       Family History   Problem Relation Age of Onset    Cancer Mother     Diabetes Mother     High Blood Pressure Mother     Heart Disease Father        Allergies:  Lactose intolerance (gi)    Home Medications:  Prior to Admission medications    Medication Sig Start Date End Date Taking?  Authorizing Provider   levothyroxine (SYNTHROID) 175 MCG tablet Take 1 tablet by mouth Daily 8/7/19  Yes Donte Carballo MD   sertraline (ZOLOFT) 50 MG tablet Take 1 tablet by mouth daily 4/12/19  Yes Donte Carballo MD   albuterol (PROVENTIL) (2.5 MG/3ML) 0.083% nebulizer solution Take 3 mLs by nebulization every 6 hours as needed for Wheezing Nebulizer treatment 3/29/19  Yes Donte Carballo MD   pantoprazole (PROTONIX) 40 MG tablet Take 1 tablet by mouth daily 12/15/17 oxygen, normal saturations, breathing comfortably, no extra work of breathing at this time    Speaking in full sentences   Abdominal: Soft. She exhibits no distension. There is no tenderness. There is no rebound and no guarding. No abdominal tenderness on examination   Musculoskeletal: She exhibits no edema or tenderness. No pain or swelling bilateral lower extremities, no calf tenderness on exam.   Neurological: She is alert. Skin: Skin is warm. No erythema.        DIFFERENTIAL  DIAGNOSIS     PLAN (LABS / IMAGING / EKG):  Orders Placed This Encounter   Procedures    Urine Culture    Culture blood #1    Culture blood #2    CT Chest Pulmonary Embolism W Contrast    Blood gas, venous    CBC auto differential    Comprehensive Metabolic Panel w/ Reflex to MG    Urinalysis    Lactate, Sepsis    APTT    Protime-INR    Lipase    Troponin    Brain Natriuretic Peptide    Blood gas, venous    Blood gas, venous    Pharmacy to Dose: Vancomycin    Initiate Oxygen Therapy Protocol    BIPAP    EKG 12 Lead    EKG REPORT       MEDICATIONS ORDERED:  Orders Placed This Encounter   Medications    DISCONTD: 0.9 % sodium chloride IV bolus 500 mL    sodium chloride flush 0.9 % injection 10 mL    sodium chloride flush 0.9 % injection 10 mL    DISCONTD: ipratropium-albuterol (DUONEB) nebulizer solution 1 ampule    magnesium sulfate 2 g in 50 mL IVPB premix    ipratropium-albuterol (DUONEB) nebulizer solution 1 ampule    ipratropium-albuterol (DUONEB) 0.5-2.5 (3) MG/3ML nebulizer solution     Norlene Boas: cabinet override    iopamidol (ISOVUE-370) 76 % injection 75 mL    0.9 % sodium chloride bolus    piperacillin-tazobactam (ZOSYN) 3.375 g in dextrose 5 % 50 mL IVPB (mini-bag)    vancomycin 1000 mg IVPB in 250 mL D5W addavial    vancomycin (VANCOCIN) intermittent dosing (placeholder)    albuterol (PROVENTIL) (2.5 MG/3ML) 0.083% nebulizer solution     Norlene Boas: cabinet override    albuterol

## 2019-08-28 PROBLEM — J44.1 COPD WITH ACUTE EXACERBATION (HCC): Status: ACTIVE | Noted: 2019-08-28

## 2019-08-28 PROBLEM — E43 SEVERE MALNUTRITION (HCC): Status: ACTIVE | Noted: 2019-08-28

## 2019-08-28 LAB
-: ABNORMAL
ABSOLUTE EOS #: 0.51 K/UL (ref 0–0.44)
ABSOLUTE IMMATURE GRANULOCYTE: <0.03 K/UL (ref 0–0.3)
ABSOLUTE LYMPH #: 1.28 K/UL (ref 1.1–3.7)
ABSOLUTE MONO #: 0.77 K/UL (ref 0.1–1.2)
ALBUMIN SERPL-MCNC: 2.8 G/DL (ref 3.5–5.2)
ALBUMIN/GLOBULIN RATIO: 0.7 (ref 1–2.5)
ALLEN TEST: ABNORMAL
ALP BLD-CCNC: 49 U/L (ref 35–104)
ALT SERPL-CCNC: 9 U/L (ref 5–33)
AMORPHOUS: ABNORMAL
ANION GAP SERPL CALCULATED.3IONS-SCNC: 4 MMOL/L (ref 9–17)
AST SERPL-CCNC: 14 U/L
BACTERIA: ABNORMAL
BASOPHILS # BLD: 1 % (ref 0–2)
BASOPHILS ABSOLUTE: 0.04 K/UL (ref 0–0.2)
BILIRUB SERPL-MCNC: 0.28 MG/DL (ref 0.3–1.2)
BILIRUBIN URINE: NEGATIVE
BUN BLDV-MCNC: 9 MG/DL (ref 8–23)
BUN/CREAT BLD: 21 (ref 9–20)
CALCIUM SERPL-MCNC: 8.2 MG/DL (ref 8.6–10.4)
CARBOXYHEMOGLOBIN: ABNORMAL % (ref 0–5)
CASTS UA: ABNORMAL /LPF
CHLORIDE BLD-SCNC: 98 MMOL/L (ref 98–107)
CO2: 34 MMOL/L (ref 20–31)
COLOR: YELLOW
COMMENT UA: ABNORMAL
CREAT SERPL-MCNC: 0.43 MG/DL (ref 0.5–0.9)
CRYSTALS, UA: ABNORMAL /HPF
DIFFERENTIAL TYPE: ABNORMAL
EOSINOPHILS RELATIVE PERCENT: 7 % (ref 1–4)
EPITHELIAL CELLS UA: ABNORMAL /HPF (ref 0–25)
FIO2: ABNORMAL
GFR AFRICAN AMERICAN: >60 ML/MIN
GFR NON-AFRICAN AMERICAN: >60 ML/MIN
GFR SERPL CREATININE-BSD FRML MDRD: ABNORMAL ML/MIN/{1.73_M2}
GFR SERPL CREATININE-BSD FRML MDRD: ABNORMAL ML/MIN/{1.73_M2}
GLUCOSE BLD-MCNC: 107 MG/DL (ref 70–99)
GLUCOSE URINE: NEGATIVE
HCO3 ARTERIAL: 35 MMOL/L (ref 22–26)
HCO3 VENOUS: 34.1 MMOL/L (ref 24–30)
HCO3 VENOUS: 35.7 MMOL/L (ref 24–30)
HCT VFR BLD CALC: 35.2 % (ref 36.3–47.1)
HEMOGLOBIN: 10.6 G/DL (ref 11.9–15.1)
IMMATURE GRANULOCYTES: 0 %
KETONES, URINE: NEGATIVE
LEUKOCYTE ESTERASE, URINE: ABNORMAL
LYMPHOCYTES # BLD: 17 % (ref 24–43)
MCH RBC QN AUTO: 30.1 PG (ref 25.2–33.5)
MCHC RBC AUTO-ENTMCNC: 30.1 G/DL (ref 28.4–34.8)
MCV RBC AUTO: 100 FL (ref 82.6–102.9)
METHEMOGLOBIN: ABNORMAL % (ref 0–1.9)
MODE: ABNORMAL
MONOCYTES # BLD: 10 % (ref 3–12)
MUCUS: ABNORMAL
NEGATIVE BASE EXCESS, ART: ABNORMAL MMOL/L (ref 0–2)
NEGATIVE BASE EXCESS, VEN: ABNORMAL MMOL/L (ref 0–2)
NEGATIVE BASE EXCESS, VEN: ABNORMAL MMOL/L (ref 0–2)
NITRITE, URINE: NEGATIVE
NOTIFICATION TIME: ABNORMAL
NOTIFICATION: ABNORMAL
NRBC AUTOMATED: 0 PER 100 WBC
O2 DEVICE/FLOW/%: ABNORMAL
O2 SAT, ARTERIAL: 90.2 % (ref 95–98)
O2 SAT, VEN: 45.7 % (ref 60–85)
O2 SAT, VEN: 69.4 % (ref 60–85)
OTHER OBSERVATIONS UA: ABNORMAL
OXYHEMOGLOBIN: ABNORMAL % (ref 95–98)
PATIENT TEMP: 37
PATIENT TEMP: ABNORMAL
PATIENT TEMP: ABNORMAL
PCO2 ARTERIAL: 73.4 MMHG (ref 35–45)
PCO2, ART, TEMP ADJ: ABNORMAL
PCO2, VEN, TEMP ADJ: ABNORMAL MMHG (ref 39–55)
PCO2, VEN, TEMP ADJ: ABNORMAL MMHG (ref 39–55)
PCO2, VEN: 70 (ref 39–55)
PCO2, VEN: 73.5 (ref 39–55)
PDW BLD-RTO: 13.4 % (ref 11.8–14.4)
PEEP/CPAP: ABNORMAL
PH ARTERIAL: 7.3 (ref 7.35–7.45)
PH UA: 6 (ref 5–9)
PH VENOUS: 7.28 (ref 7.32–7.42)
PH VENOUS: 7.33 (ref 7.32–7.42)
PH, ART, TEMP ADJ: ABNORMAL (ref 7.35–7.45)
PH, VEN, TEMP ADJ: ABNORMAL (ref 7.32–7.42)
PH, VEN, TEMP ADJ: ABNORMAL (ref 7.32–7.42)
PLATELET # BLD: 206 K/UL (ref 138–453)
PLATELET ESTIMATE: ABNORMAL
PMV BLD AUTO: 9.6 FL (ref 8.1–13.5)
PO2 ARTERIAL: 66.1 MMHG (ref 80–100)
PO2, ART, TEMP ADJ: ABNORMAL MMHG (ref 80–100)
PO2, VEN, TEMP ADJ: ABNORMAL MMHG (ref 30–50)
PO2, VEN, TEMP ADJ: ABNORMAL MMHG (ref 30–50)
PO2, VEN: 27.9 (ref 30–50)
PO2, VEN: 41.8 (ref 30–50)
POSITIVE BASE EXCESS, ART: 5.6 MMOL/L (ref 0–2)
POSITIVE BASE EXCESS, VEN: 4.7 MMOL/L (ref 0–2)
POSITIVE BASE EXCESS, VEN: 6.9 MMOL/L (ref 0–2)
POTASSIUM SERPL-SCNC: 4.1 MMOL/L (ref 3.7–5.3)
PROTEIN UA: NEGATIVE
PSV: ABNORMAL
PT. POSITION: ABNORMAL
RBC # BLD: 3.52 M/UL (ref 3.95–5.11)
RBC # BLD: ABNORMAL 10*6/UL
RBC UA: ABNORMAL /HPF (ref 0–2)
RENAL EPITHELIAL, UA: ABNORMAL /HPF
RESPIRATORY RATE: ABNORMAL
SAMPLE SITE: ABNORMAL
SEG NEUTROPHILS: 65 % (ref 36–65)
SEGMENTED NEUTROPHILS ABSOLUTE COUNT: 4.79 K/UL (ref 1.5–8.1)
SET RATE: ABNORMAL
SODIUM BLD-SCNC: 136 MMOL/L (ref 135–144)
SPECIFIC GRAVITY UA: 1.02 (ref 1.01–1.02)
TEXT FOR RESPIRATORY: ABNORMAL
TOTAL HB: ABNORMAL G/DL (ref 12–16)
TOTAL PROTEIN: 6.9 G/DL (ref 6.4–8.3)
TOTAL RATE: ABNORMAL
TRICHOMONAS: ABNORMAL
TURBIDITY: CLEAR
URINE HGB: ABNORMAL
UROBILINOGEN, URINE: NORMAL
VT: ABNORMAL
WBC # BLD: 7.4 K/UL (ref 3.5–11.3)
WBC # BLD: ABNORMAL 10*3/UL
WBC UA: ABNORMAL /HPF (ref 0–5)
YEAST: ABNORMAL

## 2019-08-28 PROCEDURE — 85025 COMPLETE CBC W/AUTO DIFF WBC: CPT

## 2019-08-28 PROCEDURE — 80053 COMPREHEN METABOLIC PANEL: CPT

## 2019-08-28 PROCEDURE — 2580000003 HC RX 258: Performed by: FAMILY MEDICINE

## 2019-08-28 PROCEDURE — 82805 BLOOD GASES W/O2 SATURATION: CPT

## 2019-08-28 PROCEDURE — 36415 COLL VENOUS BLD VENIPUNCTURE: CPT

## 2019-08-28 PROCEDURE — 6360000002 HC RX W HCPCS: Performed by: EMERGENCY MEDICINE

## 2019-08-28 PROCEDURE — 81001 URINALYSIS AUTO W/SCOPE: CPT

## 2019-08-28 PROCEDURE — 94667 MNPJ CHEST WALL 1ST: CPT

## 2019-08-28 PROCEDURE — 94640 AIRWAY INHALATION TREATMENT: CPT

## 2019-08-28 PROCEDURE — 2580000003 HC RX 258: Performed by: EMERGENCY MEDICINE

## 2019-08-28 PROCEDURE — 6370000000 HC RX 637 (ALT 250 FOR IP): Performed by: FAMILY MEDICINE

## 2019-08-28 PROCEDURE — 2000000000 HC ICU R&B

## 2019-08-28 PROCEDURE — 6360000002 HC RX W HCPCS: Performed by: FAMILY MEDICINE

## 2019-08-28 PROCEDURE — 2700000000 HC OXYGEN THERAPY PER DAY

## 2019-08-28 PROCEDURE — 6370000000 HC RX 637 (ALT 250 FOR IP): Performed by: PHYSICIAN ASSISTANT

## 2019-08-28 PROCEDURE — 6370000000 HC RX 637 (ALT 250 FOR IP): Performed by: INTERNAL MEDICINE

## 2019-08-28 PROCEDURE — 94664 DEMO&/EVAL PT USE INHALER: CPT

## 2019-08-28 PROCEDURE — 94660 CPAP INITIATION&MGMT: CPT

## 2019-08-28 PROCEDURE — 6370000000 HC RX 637 (ALT 250 FOR IP)

## 2019-08-28 PROCEDURE — 6360000002 HC RX W HCPCS: Performed by: INTERNAL MEDICINE

## 2019-08-28 RX ORDER — METHYLPREDNISOLONE SODIUM SUCCINATE 125 MG/2ML
125 INJECTION, POWDER, LYOPHILIZED, FOR SOLUTION INTRAMUSCULAR; INTRAVENOUS DAILY
Status: DISCONTINUED | OUTPATIENT
Start: 2019-08-28 | End: 2019-08-30 | Stop reason: HOSPADM

## 2019-08-28 RX ORDER — PANTOPRAZOLE SODIUM 40 MG/1
40 TABLET, DELAYED RELEASE ORAL DAILY
Status: DISCONTINUED | OUTPATIENT
Start: 2019-08-28 | End: 2019-08-30 | Stop reason: HOSPADM

## 2019-08-28 RX ORDER — ALBUTEROL SULFATE 2.5 MG/3ML
2.5 SOLUTION RESPIRATORY (INHALATION) EVERY 4 HOURS PRN
Status: DISCONTINUED | OUTPATIENT
Start: 2019-08-28 | End: 2019-08-30 | Stop reason: HOSPADM

## 2019-08-28 RX ORDER — IPRATROPIUM BROMIDE AND ALBUTEROL SULFATE 2.5; .5 MG/3ML; MG/3ML
1 SOLUTION RESPIRATORY (INHALATION) 4 TIMES DAILY
Status: DISCONTINUED | OUTPATIENT
Start: 2019-08-28 | End: 2019-08-30 | Stop reason: HOSPADM

## 2019-08-28 RX ORDER — FLUTICASONE PROPIONATE 50 MCG
1 SPRAY, SUSPENSION (ML) NASAL DAILY
Status: DISCONTINUED | OUTPATIENT
Start: 2019-08-28 | End: 2019-08-30 | Stop reason: HOSPADM

## 2019-08-28 RX ADMIN — IPRATROPIUM BROMIDE AND ALBUTEROL SULFATE 1 AMPULE: .5; 3 SOLUTION RESPIRATORY (INHALATION) at 22:39

## 2019-08-28 RX ADMIN — IPRATROPIUM BROMIDE AND ALBUTEROL SULFATE 1 AMPULE: .5; 3 SOLUTION RESPIRATORY (INHALATION) at 05:46

## 2019-08-28 RX ADMIN — FAMOTIDINE 20 MG: 20 TABLET ORAL at 09:20

## 2019-08-28 RX ADMIN — MOMETASONE FUROATE AND FORMOTEROL FUMARATE DIHYDRATE 2 PUFF: 200; 5 AEROSOL RESPIRATORY (INHALATION) at 11:13

## 2019-08-28 RX ADMIN — PANTOPRAZOLE SODIUM 40 MG: 40 TABLET, DELAYED RELEASE ORAL at 11:02

## 2019-08-28 RX ADMIN — SODIUM CHLORIDE: 9 INJECTION, SOLUTION INTRAVENOUS at 00:43

## 2019-08-28 RX ADMIN — ENOXAPARIN SODIUM 40 MG: 40 INJECTION SUBCUTANEOUS at 09:20

## 2019-08-28 RX ADMIN — LEVOTHYROXINE SODIUM 175 MCG: 150 TABLET ORAL at 11:02

## 2019-08-28 RX ADMIN — VANCOMYCIN HYDROCHLORIDE 1000 MG: 1 INJECTION, POWDER, LYOPHILIZED, FOR SOLUTION INTRAVENOUS at 20:42

## 2019-08-28 RX ADMIN — PIPERACILLIN SODIUM,TAZOBACTAM SODIUM 3.38 G: 3; .375 INJECTION, POWDER, FOR SOLUTION INTRAVENOUS at 05:33

## 2019-08-28 RX ADMIN — PIPERACILLIN SODIUM,TAZOBACTAM SODIUM 3.38 G: 3; .375 INJECTION, POWDER, FOR SOLUTION INTRAVENOUS at 14:36

## 2019-08-28 RX ADMIN — ACETAMINOPHEN 650 MG: 325 TABLET, FILM COATED ORAL at 09:24

## 2019-08-28 RX ADMIN — SERTRALINE HYDROCHLORIDE 50 MG: 50 TABLET ORAL at 11:01

## 2019-08-28 RX ADMIN — FLUTICASONE PROPIONATE 1 SPRAY: 50 SPRAY, METERED NASAL at 11:02

## 2019-08-28 RX ADMIN — PIPERACILLIN SODIUM,TAZOBACTAM SODIUM 3.38 G: 3; .375 INJECTION, POWDER, FOR SOLUTION INTRAVENOUS at 22:08

## 2019-08-28 RX ADMIN — IPRATROPIUM BROMIDE AND ALBUTEROL SULFATE 1 AMPULE: .5; 3 SOLUTION RESPIRATORY (INHALATION) at 16:23

## 2019-08-28 RX ADMIN — FAMOTIDINE 20 MG: 20 TABLET ORAL at 20:42

## 2019-08-28 RX ADMIN — METHYLPREDNISOLONE SODIUM SUCCINATE 125 MG: 125 INJECTION, POWDER, FOR SOLUTION INTRAMUSCULAR; INTRAVENOUS at 09:20

## 2019-08-28 RX ADMIN — Medication 1 SPRAY: at 11:55

## 2019-08-28 RX ADMIN — IPRATROPIUM BROMIDE AND ALBUTEROL SULFATE 1 AMPULE: .5; 3 SOLUTION RESPIRATORY (INHALATION) at 11:12

## 2019-08-28 RX ADMIN — MOMETASONE FUROATE AND FORMOTEROL FUMARATE DIHYDRATE 2 PUFF: 200; 5 AEROSOL RESPIRATORY (INHALATION) at 22:38

## 2019-08-28 RX ADMIN — SODIUM CHLORIDE: 9 INJECTION, SOLUTION INTRAVENOUS at 22:53

## 2019-08-28 ASSESSMENT — PAIN SCALES - GENERAL
PAINLEVEL_OUTOF10: 0
PAINLEVEL_OUTOF10: 0
PAINLEVEL_OUTOF10: 6
PAINLEVEL_OUTOF10: 0
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
## 2019-08-28 NOTE — ED NOTES
Pt aware of need for urine sample, denies need to urinate at this time. Bipap in place, pt resting comfortably on cot.       David Ogden RN  08/27/19 3775

## 2019-08-28 NOTE — PROGRESS NOTES
Met with Patient and her  this a.m. Patient is alert and oriented, pleasant and cooperative with this assessment,  is at her bedside, participating in discharge planning conversation as appropriate. Patient admitted with COPD exacerbation and does have history of Pulmonary Fibrosis. Patient resides with her  in [facility]. She has continuous home O2 and a Bi-Pap machine at home for assistance with breathing. Patient also uses a walker and wheelchair and has grab bars in her bathroom. Patient currently utilizing no outside community resources or services. Patient drives but  also provides for some of her transportation needs. PCP is Dr. Gorden Klinefelter. Patient denies having any difficulty with affording her medications, states that she has insurance to help cover these costs. Patient wishes to be discharged home when she is medically stable. She is a 'Full Code' status at this time, has Advanced Directives in place. No anticipated unmet needs or concerns identified by Patient or her  at this time. LSW to monitor and assist with discharge planning needs as appropriate.     [address], [employer]  8/28/2019

## 2019-08-28 NOTE — H&P
COMPARISON: CT scan of the chest 09/27/2016 HISTORY: ORDERING SYSTEM PROVIDED HISTORY: PE vs PNA/pulm edema. Desaturations on arrival in 76s FINDINGS: Pulmonary Arteries: Pulmonary arteries are adequately opacified for evaluation. No evidence of intraluminal filling defect to suggest pulmonary embolism. Main pulmonary artery is enlarged compared to the aorta. Mediastinum: Subcarinal lymph node measures 1.8 cm in short axis dimension. Fullness of the right hilar lymph nodes Lungs/pleura: Honeycombing and interstitial markings are present, progressed compared to the CT scan 09/27/2016. There appears to be superimposed patchy airspace opacity bilaterally, most severe in the right upper lobe. Atelectatic changes at the right lung base. Upper Abdomen: Limited images of the upper abdomen are unremarkable. Soft Tissues/Bones: Severe concave left scoliosis. 1.  No acute pulmonary embolism. 2.  Pulmonary fibrosis appears progressed compared to the CT scan 09/27/2016. 3.  Superimposed diffuse opacities could represent multifocal pneumonia and/or edema. 4.  Enlargement of the pulmonary artery can be seen with pulmonary arterial hypertension. Assessment:    Principal Problem:    COPD with acute exacerbation (HCC)  Active Problems:    Chronic interstitial lung disease (HCC)    Chronic hypercapnic respiratory failure     Pneumonia  Resolved Problems:    * No resolved hospital problems.  *      Patient Active Problem List    Diagnosis Date Noted    COPD with acute exacerbation ([address] 08/28/2019    Pneumonia 08/27/2019    BAYRON (obstructive sleep apnea) 11/02/2017    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 11/02/2017    Chronic cor pulmonale ([address] 11/02/2017    Pulmonary hypertension due to hypoxia ([address] 11/02/2017    IPF (idiopathic pulmonary fibrosis) ([address] 11/02/2017    Chronic hypercapnic respiratory failure  08/11/2017    Chronic interstitial lung disease ([address] 06/23/2017    Kyphoscoliosis 05/01/2017    Hypothyroidism        Plan:     · This patient requires inpatient admission because of COPD and possible pneumonia  · Factors affecting the medical complexity of this patient include Principal Problem:  ·   COPD with acute exacerbation ([address]  · Active Problems:  ·   Chronic interstitial lung disease (HCC)  ·   Chronic hypercapnic respiratory failure   ·   Pneumonia  · Resolved Problems:  ·   * No resolved hospital problems. *  ·   · Estimated length of stay is 4 days  · Steroids  · Nebs  · Antibiotics  · BIPAP  · High risk medication monitoring: zero  · ICU    CORE MEASURES  Core measures including DVT prophylaxis, Code Status, Nutrition, Therapy Options, chart reviewed and advance directives reviewed at this visit.     Anila Suarez MD  8/28/2019, 7:41 AM

## 2019-08-28 NOTE — PROGRESS NOTES
At 2314 on 8/27/19 pt was admitted with pneumonia from ER. During transfer to ICU pt was on nasal canula @ 3L. Respiratory brought BiPAP machine up with pt.  at bedside. Pt transferred from ER cot to ICU bed with minimal assist. Pt tolerated well. Normal saline bolus with vancomycin IVPB infusing from ER. Pt denies any pain at this time. Pt hooked up to ICU monitors. Navigator, assessment and vital signs as charted. Non skid socks on, bed in low position and call light in reach. Pt is A & O x4, uses call light properly. Pt oriented to room. Bedside tablet set-up. Pt states she has a pulmonary rehab appointment on 8/28/19 @ 0930 so that will need to be canceled. Writer will pass along to day shift nurse. At 2349 writer contacted Dr. Bo Camacho on call for Dr. Seema Church regarding admission orders. Orders received. Dr. Bo Camacho states home medications can be reviewed by Dr. Seema Church in am on 8/28/19. Writer contacted respiratory to place BiPAP back on pt since admission was complete.

## 2019-08-28 NOTE — PLAN OF CARE
Problem: Airway Clearance - Ineffective:  Goal: Ability to maintain a clear airway will improve  Description  Ability to maintain a clear airway will improve  Outcome: Met This Shift  Note:   Patient able to cough spontaneously. Airway patent. Patient uses bipap at night and at night. Patient able to remove bipap mask per self if need arises. Oxygen at 2 liters per nasal cannula with not on bipap. Problem: Falls - Risk of:  Goal: Will remain free from falls  Description  Will remain free from falls  8/28/2019 1459 by Ammon Dickerson RN  Outcome: Ongoing  Note:   Falling star program in place. Fall risk calculated at 61. Patient alert and oriented to own ability and uses call light appropriately. No Falls as of present. 8/28/2019 0113 by Chava Aburto RN  Outcome: Ongoing  Note:   Pt is at medium risk for falls. Non skid socks on. Bed in low position and wheels locked. Fall sign posted and bracelet on. Siderails up x2. Pt is A & Ox4, uses call light properly. Call light within reach. Will continue to assess. Goal: Absence of physical injury  Description  Absence of physical injury  Outcome: Ongoing  Note:   Colin fall score calculated on admission and daily at midnight and shows pt at 60 risk for fall. Bed in lowest position at all times with wheels locked. Bed alarm active. Falling star posted on door frame and yellow sticker visible on patient bracelet. Call light and bedside table with in reach. ID information verified as correct and visible. Will continue to monitor and intervene as necessary. Ammon Dickerson RN      Problem: Discharge Planning:  Goal: Discharged to appropriate level of care  Description  Discharged to appropriate level of care  Outcome: Ongoing  Note:   Patient resides in private residence with , with plan to return upon discharge. Case management for any forthcoming home needs. Denies needs at present time.   Goal: Participates in care planning  Description  Participates in care planning  Outcome: Ongoing  Note:   Patient alert and oriented, and able to participate in care planning. Problem: Airway Clearance - Ineffective:  Goal: Clear lung sounds  Description  Clear lung sounds  8/28/2019 1459 by Julissa Paris RN  Outcome: Ongoing  Note:   Lung fields with crackles throughout. Patient has pulmonary fibrosis. 8/28/2019 0113 by Bahman Cristobal RN  Outcome: Ongoing  Note:   Crackles heard t/o lung fields. Will continue to monitor. Problem: Fluid Volume - Deficit:  Goal: Achieves intake and output within specified parameters  Description  Achieves intake and output within specified parameters  8/28/2019 1459 by Julissa Paris RN  Outcome: Ongoing  Note:   Intake and output maintained. No peripheral edema. 8/28/2019 0113 by Bahman Cristobal RN  Outcome: Ongoing  Note:   IV fluids infusing. Oral fluids encouraged. Urine output measured. I & O monitored. Daily weights. Labs drawn daily. Will continue to assess. Problem: Gas Exchange - Impaired:  Goal: Levels of oxygenation will improve  Description  Levels of oxygenation will improve  8/28/2019 0113 by Bahman Cristobal RN  Outcome: Ongoing  Note:   Pulse ox WNL. BiPAP on at 18/8 & 35%. Pt denies any discomfort. Will continue to assess. Problem: Hyperthermia:  Goal: Ability to maintain a body temperature in the normal range will improve  Description  Ability to maintain a body temperature in the normal range will improve  8/28/2019 0113 by Bahman Cristobal RN  Outcome: Ongoing  Note:   No temperature noted. Will continue to monitor. Problem: Discharge Planning:  Goal: Discharged to appropriate level of care  Description  Discharged to appropriate level of care  Outcome: Ongoing  Note:   Patient resides in private residence with , with plan to return upon discharge. Case management for any forthcoming home needs. Denies needs at present time.      Problem:

## 2019-08-29 LAB
ABSOLUTE EOS #: 0.04 K/UL (ref 0–0.44)
ABSOLUTE IMMATURE GRANULOCYTE: <0.03 K/UL (ref 0–0.3)
ABSOLUTE LYMPH #: 1.29 K/UL (ref 1.1–3.7)
ABSOLUTE MONO #: 0.71 K/UL (ref 0.1–1.2)
ALBUMIN SERPL-MCNC: 2.6 G/DL (ref 3.5–5.2)
ALBUMIN/GLOBULIN RATIO: 0.7 (ref 1–2.5)
ALP BLD-CCNC: 41 U/L (ref 35–104)
ALT SERPL-CCNC: 10 U/L (ref 5–33)
ANION GAP SERPL CALCULATED.3IONS-SCNC: 3 MMOL/L (ref 9–17)
AST SERPL-CCNC: 12 U/L
BASOPHILS # BLD: 0 % (ref 0–2)
BASOPHILS ABSOLUTE: <0.03 K/UL (ref 0–0.2)
BILIRUB SERPL-MCNC: 0.21 MG/DL (ref 0.3–1.2)
BUN BLDV-MCNC: 11 MG/DL (ref 8–23)
BUN/CREAT BLD: 20 (ref 9–20)
CALCIUM SERPL-MCNC: 8.5 MG/DL (ref 8.6–10.4)
CHLORIDE BLD-SCNC: 102 MMOL/L (ref 98–107)
CO2: 35 MMOL/L (ref 20–31)
CREAT SERPL-MCNC: 0.54 MG/DL (ref 0.5–0.9)
CULTURE: NO GROWTH
DIFFERENTIAL TYPE: ABNORMAL
EOSINOPHILS RELATIVE PERCENT: 1 % (ref 1–4)
GFR AFRICAN AMERICAN: >60 ML/MIN
GFR NON-AFRICAN AMERICAN: >60 ML/MIN
GFR SERPL CREATININE-BSD FRML MDRD: ABNORMAL ML/MIN/{1.73_M2}
GFR SERPL CREATININE-BSD FRML MDRD: ABNORMAL ML/MIN/{1.73_M2}
GLUCOSE BLD-MCNC: 100 MG/DL (ref 70–99)
HCT VFR BLD CALC: 32.2 % (ref 36.3–47.1)
HEMOGLOBIN: 9.6 G/DL (ref 11.9–15.1)
IMMATURE GRANULOCYTES: 0 %
LYMPHOCYTES # BLD: 21 % (ref 24–43)
Lab: NORMAL
MCH RBC QN AUTO: 29.6 PG (ref 25.2–33.5)
MCHC RBC AUTO-ENTMCNC: 29.8 G/DL (ref 28.4–34.8)
MCV RBC AUTO: 99.4 FL (ref 82.6–102.9)
MONOCYTES # BLD: 12 % (ref 3–12)
NRBC AUTOMATED: 0 PER 100 WBC
PDW BLD-RTO: 13.3 % (ref 11.8–14.4)
PLATELET # BLD: 172 K/UL (ref 138–453)
PLATELET ESTIMATE: ABNORMAL
PMV BLD AUTO: 9.7 FL (ref 8.1–13.5)
POTASSIUM SERPL-SCNC: 4.9 MMOL/L (ref 3.7–5.3)
RBC # BLD: 3.24 M/UL (ref 3.95–5.11)
RBC # BLD: ABNORMAL 10*6/UL
SEG NEUTROPHILS: 66 % (ref 36–65)
SEGMENTED NEUTROPHILS ABSOLUTE COUNT: 4.02 K/UL (ref 1.5–8.1)
SODIUM BLD-SCNC: 140 MMOL/L (ref 135–144)
SPECIMEN DESCRIPTION: NORMAL
TOTAL PROTEIN: 6.6 G/DL (ref 6.4–8.3)
WBC # BLD: 6.1 K/UL (ref 3.5–11.3)
WBC # BLD: ABNORMAL 10*3/UL

## 2019-08-29 PROCEDURE — 6360000002 HC RX W HCPCS: Performed by: INTERNAL MEDICINE

## 2019-08-29 PROCEDURE — 97162 PT EVAL MOD COMPLEX 30 MIN: CPT

## 2019-08-29 PROCEDURE — 6360000002 HC RX W HCPCS: Performed by: PHYSICIAN ASSISTANT

## 2019-08-29 PROCEDURE — 6370000000 HC RX 637 (ALT 250 FOR IP): Performed by: FAMILY MEDICINE

## 2019-08-29 PROCEDURE — 2700000000 HC OXYGEN THERAPY PER DAY

## 2019-08-29 PROCEDURE — 94660 CPAP INITIATION&MGMT: CPT

## 2019-08-29 PROCEDURE — 85025 COMPLETE CBC W/AUTO DIFF WBC: CPT

## 2019-08-29 PROCEDURE — 2580000003 HC RX 258: Performed by: PHYSICIAN ASSISTANT

## 2019-08-29 PROCEDURE — 6360000002 HC RX W HCPCS: Performed by: FAMILY MEDICINE

## 2019-08-29 PROCEDURE — 97116 GAIT TRAINING THERAPY: CPT

## 2019-08-29 PROCEDURE — 97166 OT EVAL MOD COMPLEX 45 MIN: CPT

## 2019-08-29 PROCEDURE — 6370000000 HC RX 637 (ALT 250 FOR IP): Performed by: PHYSICIAN ASSISTANT

## 2019-08-29 PROCEDURE — 2580000003 HC RX 258: Performed by: FAMILY MEDICINE

## 2019-08-29 PROCEDURE — 6370000000 HC RX 637 (ALT 250 FOR IP): Performed by: INTERNAL MEDICINE

## 2019-08-29 PROCEDURE — 1200000000 HC SEMI PRIVATE

## 2019-08-29 PROCEDURE — 94664 DEMO&/EVAL PT USE INHALER: CPT

## 2019-08-29 PROCEDURE — 94668 MNPJ CHEST WALL SBSQ: CPT

## 2019-08-29 PROCEDURE — 94640 AIRWAY INHALATION TREATMENT: CPT

## 2019-08-29 PROCEDURE — 36415 COLL VENOUS BLD VENIPUNCTURE: CPT

## 2019-08-29 PROCEDURE — 80053 COMPREHEN METABOLIC PANEL: CPT

## 2019-08-29 RX ADMIN — PIPERACILLIN SODIUM,TAZOBACTAM SODIUM 3.38 G: 3; .375 INJECTION, POWDER, FOR SOLUTION INTRAVENOUS at 13:22

## 2019-08-29 RX ADMIN — PIPERACILLIN SODIUM,TAZOBACTAM SODIUM 3.38 G: 3; .375 INJECTION, POWDER, FOR SOLUTION INTRAVENOUS at 05:31

## 2019-08-29 RX ADMIN — SODIUM CHLORIDE: 9 INJECTION, SOLUTION INTRAVENOUS at 19:57

## 2019-08-29 RX ADMIN — IPRATROPIUM BROMIDE AND ALBUTEROL SULFATE 1 AMPULE: .5; 3 SOLUTION RESPIRATORY (INHALATION) at 06:45

## 2019-08-29 RX ADMIN — VANCOMYCIN HYDROCHLORIDE 1000 MG: 1 INJECTION, POWDER, LYOPHILIZED, FOR SOLUTION INTRAVENOUS at 20:35

## 2019-08-29 RX ADMIN — PANTOPRAZOLE SODIUM 40 MG: 40 TABLET, DELAYED RELEASE ORAL at 09:13

## 2019-08-29 RX ADMIN — FAMOTIDINE 20 MG: 20 TABLET ORAL at 20:35

## 2019-08-29 RX ADMIN — FAMOTIDINE 20 MG: 20 TABLET ORAL at 09:13

## 2019-08-29 RX ADMIN — ENOXAPARIN SODIUM 40 MG: 40 INJECTION SUBCUTANEOUS at 09:13

## 2019-08-29 RX ADMIN — LEVOTHYROXINE SODIUM 175 MCG: 150 TABLET ORAL at 09:13

## 2019-08-29 RX ADMIN — METHYLPREDNISOLONE SODIUM SUCCINATE 125 MG: 125 INJECTION, POWDER, FOR SOLUTION INTRAMUSCULAR; INTRAVENOUS at 09:13

## 2019-08-29 RX ADMIN — PIPERACILLIN SODIUM,TAZOBACTAM SODIUM 3.38 G: 3; .375 INJECTION, POWDER, FOR SOLUTION INTRAVENOUS at 22:09

## 2019-08-29 RX ADMIN — SERTRALINE HYDROCHLORIDE 50 MG: 50 TABLET ORAL at 09:13

## 2019-08-29 RX ADMIN — MOMETASONE FUROATE AND FORMOTEROL FUMARATE DIHYDRATE 2 PUFF: 200; 5 AEROSOL RESPIRATORY (INHALATION) at 06:45

## 2019-08-29 RX ADMIN — IPRATROPIUM BROMIDE AND ALBUTEROL SULFATE 1 AMPULE: .5; 3 SOLUTION RESPIRATORY (INHALATION) at 20:16

## 2019-08-29 RX ADMIN — IPRATROPIUM BROMIDE AND ALBUTEROL SULFATE 1 AMPULE: .5; 3 SOLUTION RESPIRATORY (INHALATION) at 14:10

## 2019-08-29 RX ADMIN — MOMETASONE FUROATE AND FORMOTEROL FUMARATE DIHYDRATE 2 PUFF: 200; 5 AEROSOL RESPIRATORY (INHALATION) at 20:16

## 2019-08-29 ASSESSMENT — PAIN SCALES - GENERAL
PAINLEVEL_OUTOF10: 0

## 2019-08-29 NOTE — CONSULTS
[facility] [facility]              Inpatient Medication Education Note                                 Patient admitted for COPD with acute exacerbation, PNA. Medications reviewed with the patient include:  enoxaparin, DuoNeb, famotidine, methylprednisolone, ondansetron, piperacillin/tazobactam (patient education monograph provided), vancomycin (patient education monograph provided). Patient education provided when necessary to include potential medication related side effects with acknowledgement of understanding. Tiffany Botello.  Lucila Koroma, 8/28/2019, 11:26 AM
Extensive disease; Total care; Mouth care only; Drowsy/coma  ___0       Death    Readmission Risk:  13%    Notes:  Marti Toro is sitting up in the chair during my visit. Her  is present in the room. Marti Toro is known to me from previous hospitalization in 2017 for pneumonia. Marti Toro is in ICU with issues with her COPD. Marti Toro states \"I guess I was getting too much oxygen\". Tells me that she typically uses O2 @ 2 l/min at home,  Was recently increased to 3 l/min and states that when she is active she increases it a little higher. Just started pulmonary rehab a week ago, telling me that they increase the oxygen there while she is exercising. Discussed CO2 levels and oxygen demand. Discussed importance of discussing with pulmonologist or PCP regarding oxygen use with activity, etc. Has her own BiPap at home and thinks she needs a new mask. Feels mask not fitting as well. Encouraged to contact her O2 supply complany ([employer]). Marti Toro tells me that she is still able to do her ADL's at home, though does increase her oxygen flow rate with activity. Went to the [facility] and they recommended pulmonary rehab. Is not a candidate for a cherelle transplant. Does not see pulmonology until later this year. Feels that she has been doing fairly well and is looking forward to returning to pulmonary rehab. Encouragement given. Tells me that she typically does not have a good appetite. Relates this to having a hiatal hernia. Does drink Boost at home. Is having smaller portions of foods more often throughout the day. Feels her appetite is a little better. Denies other problems or concerns at this time. ACP discussion. Has advance directives. DNR discussion, especially in relationship to mechanical ventilation. States that she has not talked with her doctors about this. Encouraged pt to talk with her /family regarding her wishes for care.   Differences between [facility] and [facility] discussed and

## 2019-08-29 NOTE — PROGRESS NOTES
Progress Note    SUBJECTIVE:  FU related to  / doing better. Still dyspnea. No CP. OBJECTIVE:    Vitals:   TEMPERATURE:  Current - Temp: 98.6 °F (37 °C); Max - Temp  Av.9 °F (37.2 °C)  Min: 98 °F (36.7 °C)  Max: 100 °F (37.8 °C)  RESPIRATIONS RANGE: Resp  Av.2  Min: 18  Max: 28  PULSE RANGE: Pulse  Av.5  Min: 62  Max: 103  BLOOD PRESSURE RANGE:  Systolic (63DGP), PXR:399 , Min:88 , FEP:116   ; Diastolic (15BTW), QZ, Min:50, Max:86    PULSE OXIMETRY RANGE: SpO2  Av.1 %  Min: 90 %  Max: 98 %  24HR INTAKE/OUTPUT:      Intake/Output Summary (Last 24 hours) at 2019 0749  Last data filed at 2019 1256  Gross per 24 hour   Intake 1758 ml   Output 600 ml   Net 1158 ml     -----------------------------------------------------------------  Exam:  General: A & O x3  HEENT: Supple neck & negative  Heart: Regular  Lungs: dry crackles with no wheezes. no retractions.   Abdomen: Normal & soft, No tenderness and BS normal  Extremities:  No edema   Neuro: NonFocal     -----------------------------------------------------------------  Diagnostic Data:  Lab Results   Component Value Date    WBC 6.1 2019    HGB 9.6 (L) 2019     2019       Lab Results   Component Value Date    BUN 11 2019    CREATININE 0.54 2019     2019    K 4.9 2019    CALCIUM 8.5 (L) 2019     2019    CO2 35 (H) 2019    LABGLOM >60 2019       Lab Results   Component Value Date    WBCUA 5 TO 10 2019    RBCUA 0 TO 2 2019    EPITHUA 2 TO 5 2019    LEUKOCYTESUR SMALL (A) 2019    SPECGRAV 1.020 2019    GLUCOSEU NEGATIVE 2019    KETUA NEGATIVE 2019    PROTEINU NEGATIVE 2019    HGBUR TRACE (A) 2019    CASTUA NOT REPORTED 2019    CRYSTUA NOT REPORTED 2019    BACTERIA TRACE (A) 2019    YEAST NOT REPORTED 2019       Lab Results   Component Value Date    TROPONINT <0.03 2019 PROBNP 172 08/27/2019       Ct Chest Pulmonary Embolism W Contrast    Result Date: 8/27/2019  EXAMINATION: CTA OF THE CHEST 8/27/2019 8:44 pm TECHNIQUE: CTA of the chest was performed after the administration of intravenous contrast.  Multiplanar reformatted images are provided for review. MIP images are provided for review. Dose modulation, iterative reconstruction, and/or weight based adjustment of the mA/kV was utilized to reduce the radiation dose to as low as reasonably achievable. COMPARISON: CT scan of the chest 09/27/2016 HISTORY: ORDERING SYSTEM PROVIDED HISTORY: PE vs PNA/pulm edema. Desaturations on arrival in 76s FINDINGS: Pulmonary Arteries: Pulmonary arteries are adequately opacified for evaluation. No evidence of intraluminal filling defect to suggest pulmonary embolism. Main pulmonary artery is enlarged compared to the aorta. Mediastinum: Subcarinal lymph node measures 1.8 cm in short axis dimension. Fullness of the right hilar lymph nodes Lungs/pleura: Honeycombing and interstitial markings are present, progressed compared to the CT scan 09/27/2016. There appears to be superimposed patchy airspace opacity bilaterally, most severe in the right upper lobe. Atelectatic changes at the right lung base. Upper Abdomen: Limited images of the upper abdomen are unremarkable. Soft Tissues/Bones: Severe concave left scoliosis. 1.  No acute pulmonary embolism. 2.  Pulmonary fibrosis appears progressed compared to the CT scan 09/27/2016. 3.  Superimposed diffuse opacities could represent multifocal pneumonia and/or edema. 4.  Enlargement of the pulmonary artery can be seen with pulmonary arterial hypertension. ASSESSMENT:    Principal Problem:    COPD with acute exacerbation ([address]  Active Problems:    Chronic interstitial lung disease (HCC)    Chronic hypercapnic respiratory failure     Pneumonia    Severe malnutrition ([address]  Resolved Problems:    * No resolved hospital problems.  *      Patient

## 2019-08-29 NOTE — CARE COORDINATION
Dr Norberto Montanez at side discussed O2 and CO2 retention. Patient asked if it was ok for her to increase O2 flow when ambulating at home and if it would increase CO2 retention.  states that for short term it would be ok, but otherwise discussed keeping saO2 around 92%. Noted to be 98% on the monitor with 3L. Patient states that she is normally on 3-4 at home. Dr Norberto Montanez advised to decrease to 2L at this time. Nurse notified of the change.

## 2019-08-29 NOTE — PROGRESS NOTES
Pt up to BR, sp02 dropped to 78% on oxygen 2L NC, pt returned to chair and sp02 returned to 90% in about 1 minute, will continue to monitor.

## 2019-08-30 VITALS
HEART RATE: 88 BPM | DIASTOLIC BLOOD PRESSURE: 61 MMHG | RESPIRATION RATE: 18 BRPM | HEIGHT: 60 IN | BODY MASS INDEX: 24.26 KG/M2 | WEIGHT: 123.6 LBS | SYSTOLIC BLOOD PRESSURE: 118 MMHG | OXYGEN SATURATION: 95 % | TEMPERATURE: 98.1 F

## 2019-08-30 PROBLEM — J12.9 VIRAL PNEUMONIA: Status: ACTIVE | Noted: 2019-08-30

## 2019-08-30 LAB
ABSOLUTE EOS #: 0.05 K/UL (ref 0–0.44)
ABSOLUTE IMMATURE GRANULOCYTE: <0.03 K/UL (ref 0–0.3)
ABSOLUTE LYMPH #: 1.53 K/UL (ref 1.1–3.7)
ABSOLUTE MONO #: 0.63 K/UL (ref 0.1–1.2)
ALBUMIN SERPL-MCNC: 2.7 G/DL (ref 3.5–5.2)
ALBUMIN/GLOBULIN RATIO: 0.7 (ref 1–2.5)
ALP BLD-CCNC: 42 U/L (ref 35–104)
ALT SERPL-CCNC: 12 U/L (ref 5–33)
ANION GAP SERPL CALCULATED.3IONS-SCNC: 4 MMOL/L (ref 9–17)
AST SERPL-CCNC: 12 U/L
BASOPHILS # BLD: 0 % (ref 0–2)
BASOPHILS ABSOLUTE: <0.03 K/UL (ref 0–0.2)
BILIRUB SERPL-MCNC: 0.21 MG/DL (ref 0.3–1.2)
BUN BLDV-MCNC: 13 MG/DL (ref 8–23)
BUN/CREAT BLD: 22 (ref 9–20)
CALCIUM SERPL-MCNC: 8.8 MG/DL (ref 8.6–10.4)
CHLORIDE BLD-SCNC: 100 MMOL/L (ref 98–107)
CO2: 37 MMOL/L (ref 20–31)
CREAT SERPL-MCNC: 0.58 MG/DL (ref 0.5–0.9)
DIFFERENTIAL TYPE: ABNORMAL
EOSINOPHILS RELATIVE PERCENT: 1 % (ref 1–4)
GFR AFRICAN AMERICAN: >60 ML/MIN
GFR NON-AFRICAN AMERICAN: >60 ML/MIN
GFR SERPL CREATININE-BSD FRML MDRD: ABNORMAL ML/MIN/{1.73_M2}
GFR SERPL CREATININE-BSD FRML MDRD: ABNORMAL ML/MIN/{1.73_M2}
GLUCOSE BLD-MCNC: 92 MG/DL (ref 70–99)
HCT VFR BLD CALC: 32.3 % (ref 36.3–47.1)
HEMOGLOBIN: 9.5 G/DL (ref 11.9–15.1)
IMMATURE GRANULOCYTES: 0 %
LYMPHOCYTES # BLD: 26 % (ref 24–43)
MCH RBC QN AUTO: 29.4 PG (ref 25.2–33.5)
MCHC RBC AUTO-ENTMCNC: 29.4 G/DL (ref 28.4–34.8)
MCV RBC AUTO: 100 FL (ref 82.6–102.9)
MONOCYTES # BLD: 11 % (ref 3–12)
NRBC AUTOMATED: 0 PER 100 WBC
PDW BLD-RTO: 13.3 % (ref 11.8–14.4)
PLATELET # BLD: 172 K/UL (ref 138–453)
PLATELET ESTIMATE: ABNORMAL
PMV BLD AUTO: 9.5 FL (ref 8.1–13.5)
POTASSIUM SERPL-SCNC: 4.8 MMOL/L (ref 3.7–5.3)
RBC # BLD: 3.23 M/UL (ref 3.95–5.11)
RBC # BLD: ABNORMAL 10*6/UL
SEG NEUTROPHILS: 62 % (ref 36–65)
SEGMENTED NEUTROPHILS ABSOLUTE COUNT: 3.72 K/UL (ref 1.5–8.1)
SODIUM BLD-SCNC: 141 MMOL/L (ref 135–144)
TOTAL PROTEIN: 6.8 G/DL (ref 6.4–8.3)
WBC # BLD: 6 K/UL (ref 3.5–11.3)
WBC # BLD: ABNORMAL 10*3/UL

## 2019-08-30 PROCEDURE — 2580000003 HC RX 258: Performed by: PHYSICIAN ASSISTANT

## 2019-08-30 PROCEDURE — 94660 CPAP INITIATION&MGMT: CPT

## 2019-08-30 PROCEDURE — 94664 DEMO&/EVAL PT USE INHALER: CPT

## 2019-08-30 PROCEDURE — 36415 COLL VENOUS BLD VENIPUNCTURE: CPT

## 2019-08-30 PROCEDURE — 94640 AIRWAY INHALATION TREATMENT: CPT

## 2019-08-30 PROCEDURE — 6360000002 HC RX W HCPCS: Performed by: PHYSICIAN ASSISTANT

## 2019-08-30 PROCEDURE — 94760 N-INVAS EAR/PLS OXIMETRY 1: CPT

## 2019-08-30 PROCEDURE — 2700000000 HC OXYGEN THERAPY PER DAY

## 2019-08-30 PROCEDURE — 80053 COMPREHEN METABOLIC PANEL: CPT

## 2019-08-30 PROCEDURE — 6370000000 HC RX 637 (ALT 250 FOR IP): Performed by: PHYSICIAN ASSISTANT

## 2019-08-30 PROCEDURE — 85025 COMPLETE CBC W/AUTO DIFF WBC: CPT

## 2019-08-30 RX ORDER — PREDNISONE 10 MG/1
TABLET ORAL
Qty: 30 TABLET | Refills: 0 | Status: SHIPPED | OUTPATIENT
Start: 2019-08-30 | End: 2019-11-19 | Stop reason: ALTCHOICE

## 2019-08-30 RX ORDER — LEVOFLOXACIN 500 MG/1
500 TABLET, FILM COATED ORAL DAILY
Qty: 7 TABLET | Refills: 0 | Status: SHIPPED | OUTPATIENT
Start: 2019-08-30 | End: 2020-02-10 | Stop reason: ALTCHOICE

## 2019-08-30 RX ADMIN — ENOXAPARIN SODIUM 40 MG: 40 INJECTION SUBCUTANEOUS at 08:48

## 2019-08-30 RX ADMIN — PANTOPRAZOLE SODIUM 40 MG: 40 TABLET, DELAYED RELEASE ORAL at 08:48

## 2019-08-30 RX ADMIN — PIPERACILLIN SODIUM,TAZOBACTAM SODIUM 3.38 G: 3; .375 INJECTION, POWDER, FOR SOLUTION INTRAVENOUS at 05:27

## 2019-08-30 RX ADMIN — FLUTICASONE PROPIONATE 1 SPRAY: 50 SPRAY, METERED NASAL at 08:53

## 2019-08-30 RX ADMIN — FAMOTIDINE 20 MG: 20 TABLET ORAL at 08:48

## 2019-08-30 RX ADMIN — IPRATROPIUM BROMIDE AND ALBUTEROL SULFATE 1 AMPULE: .5; 3 SOLUTION RESPIRATORY (INHALATION) at 05:56

## 2019-08-30 RX ADMIN — Medication 10 ML: at 08:49

## 2019-08-30 RX ADMIN — ACETAMINOPHEN 650 MG: 325 TABLET, FILM COATED ORAL at 06:39

## 2019-08-30 RX ADMIN — METHYLPREDNISOLONE SODIUM SUCCINATE 125 MG: 125 INJECTION, POWDER, FOR SOLUTION INTRAMUSCULAR; INTRAVENOUS at 08:49

## 2019-08-30 RX ADMIN — SERTRALINE HYDROCHLORIDE 50 MG: 50 TABLET ORAL at 08:48

## 2019-08-30 RX ADMIN — LEVOTHYROXINE SODIUM 175 MCG: 150 TABLET ORAL at 06:39

## 2019-08-30 ASSESSMENT — PAIN SCALES - GENERAL
PAINLEVEL_OUTOF10: 5
PAINLEVEL_OUTOF10: 3

## 2019-08-30 ASSESSMENT — PAIN DESCRIPTION - LOCATION: LOCATION: HEAD

## 2019-08-30 NOTE — PROGRESS NOTES
72 hrs   HHN AEROSOL THERAPY with  [physician-ordered bronchodilator(s)] q 4 & Albuterol PRN q2 hrs. Breath-Actuated Neb if BBS Acuity = 4, and pt. can use MP. Notify physician if condition deteriorates. HHN AEROSOL THERAPY with  [physician-ordered bronchodilator(s)]  QID and Albuterol PRN q4 hrs. Breath-Actuated Neb if BBS Acuity = 4, and pt. can use MP. Notify physician if condition deteriorates. MDI THERAPY with  2 actuations of [physician-ordered bronchodilator(s)] via spacer TID Albuterol and PRNq4 hrs. If unable to utilize MDI: HHN [physician-ordered bronchodilator(s)] TID and Albuterol PRN q4 hrs. Notify physician if condition deteriorates. MDI THERAPY with  [physician-ordered bronchodilator(s)] via spacer TID PRN. If unable to utilize MDI: HHN [physician-ordered bronchodilator(s)] TID PRN. Notify physician if condition deteriorates. If Acuity Level is 2, 3, or 4 in any of the following:    [] COUGH     [] SURGICAL HISTORY (SURG HX)  [x] CHEST XRAY (CXR)    Goal: Improvement in sputum mobilization in patients with ineffective airway clearance. Reverse atelectasis. [x] Bronchopulmonary Hygiene Protocol    Total Acuity:   16-32  []  Secondary Assessment in 24 hrs Total Acuity:  9-15  [x]  Secondary Assessment in 24 hrs Total Acuity:  4-8  []  Secondary Assessment in 48 hrs Total Acuity:  0-3  []  Secondary Assessment in 72 hrs   METANEB QID with [physician-ordered bronchodilator(s)] if CXR Acuity = 4; otherwise:  PD&P, PEP, or Vest QID & PRN  NT Sxn PRN for ineffective cough  METANEB QID with [physician-ordered bronchodilator(s)] if CXR Acuity = 4; otherwise:  PD&P, PEP, or Vest TID & PRN  NT Sxn PRN for ineffective cough  Instruct patient to self-perform IS q1hr WA   Directed Cough self-performed q1hr WA     If Acuity Level is 2 or above in the following:    [] PULMONARY HISTORY (PULM HX)    Goal: Assist patient in quitting smoking to slow or stop the progression of lung disease.     [] Smoking Cessation Protocol    SMOKING CESSATION EDUCATION provided according to policy OJ_921: (agatha with an X)  ____Yes    ____ No     ____ NA    Smoking Cessation Booklet given:  ____Yes  ____No ____Patient Leah Woodard

## 2019-09-01 LAB
CULTURE: NORMAL
CULTURE: NORMAL
Lab: NORMAL
Lab: NORMAL
SPECIMEN DESCRIPTION: NORMAL
SPECIMEN DESCRIPTION: NORMAL

## 2019-09-03 ENCOUNTER — TELEPHONE (OUTPATIENT)
Dept: PHARMACY | Facility: CLINIC | Age: 69
End: 2019-09-03

## 2019-09-03 NOTE — TELEPHONE ENCOUNTER
CLINICAL PHARMACY NOTE  Post-Discharge Transitions of Care (TASIA)      Subjective/Objective:  Fransisco Heart is a 81 y.o. female. Patient was discharged from [facility] on 8/30/2019 with a diagnosis of COPD with acute exacerbation. Attempt made to contact patient post-discharge for medication review. Left message on home/mobile requesting call back at direct line [phone]. Will continue to attempt to contact patient as appropriate.     Justin Harris PharmD   Population Health Management Fellow   Direct: [phone]  [address] [place] [address],[address] Department Toll Free: [phone], option [phone]     -----------------------------------------------------------------------------------------------------------------------------------------------  CLINICAL PHARMACY NOTE   POST-DISCHARGE TELEPHONE FOLLOW-UP ADDENDUM    For Pharmacy Admin Tracking Only    TCM Call Made?: Yes  [facility]) Select Patient?: Yes  Total # of Interventions Recommended: 0  Total # Interventions Accepted: 0  Intervention Severity:   - Level 1 Intervention Present?: No   - Level 2 #: 0   - Level 3 #: 0  Outreach Status: Patient Unreachable  Care Coordinator Outreach to Patient?: Yes  Provider Contacted?: No  Time Spent (min): 15    Additional Documentation:

## 2019-09-04 ENCOUNTER — HOSPITAL ENCOUNTER (OUTPATIENT)
Dept: CARDIAC REHAB | Age: 69
Setting detail: THERAPIES SERIES
Discharge: HOME | End: 2019-09-04
Payer: MEDICARE

## 2019-09-04 ENCOUNTER — CARE COORDINATION (OUTPATIENT)
Dept: CASE MANAGEMENT | Age: 69
End: 2019-09-04

## 2019-09-04 DIAGNOSIS — J12.9 VIRAL PNEUMONIA: Primary | ICD-10-CM

## 2019-09-04 PROCEDURE — 1111F DSCHRG MED/CURRENT MED MERGE: CPT | Performed by: INTERNAL MEDICINE

## 2019-09-04 PROCEDURE — G0424 PULMONARY REHAB W EXER: HCPCS

## 2019-09-04 NOTE — CARE COORDINATION
Interventions         Follow Up  Future Appointments   Date Time Provider Cricket Quintana   9/5/2019  9:30 AM [facility] CARDIOPULM REHAB RM 2 [facility] [place]   9/9/2019  9:30 AM [facility] CARDIOPULM REHAB RM 2 [facility]ARD[facility] [place]   9/10/2019 10:50 AM MD Nguyen Vargas Meenu Martell [place]   9/11/2019  9:30 AM [facility] CARDIOPULM REHAB RM 2 [facility] CARDI[facility] [place]   9/12/2019  9:30 AM [facility] CARDIOPULM REHAB RM 2 [facility] CARDIAC [place]   9/16/2019  9:30 AM [facility] CARDIOPULM REHAB RM 2 [facility] CARDIAC [place]   9/18/2019  9:30 AM [facility] CARDIOPULM REHAB RM 2 [facility] CARDIAC [place]   9/19/2019  9:30 AM M[facility] CARDIOPULM REHAB RM 2 [facility] CARDIAC [place]   9/23/2019  9:30 AM [facility] CARDIOPULM REHAB RM 2 [facility] CARDIAC [place]   9/25/2019  9:30 AM [facility] CARDIOPULM REHAB RM 2 [facility] CARDIAC [place]   9/26/2019  9:30 AM [facility] CARDIOPULM REHAB RM 2 [facility] CARDIAC [place]   9/30/2019  9:30 AM M[facility] CARDIOPULM REHAB RM 2 M[facility] CARDIAC [place]   10/2/2019  9:30 AM M[facility] CARDIOPULM REHAB RM 2 M[facility] CARDIAC [place]   10/3/2019  9:30 AM M[facility] CARDIOPULM REHAB RM 2 M[facility] CARDIAC [place]   10/7/2019  9:30 AM M[facility] CARDIOPULM REHAB RM 2 [facility] CARDIAC [place]   10/9/2019  9:30 AM M[facility] CARDIOPULM REHAB RM 2 MTHZ CARDIAC [place]   10/10/2019  9:30 AM M[facility] CARDIOPULM REHAB RM 2 M[facility] CARDIAC [place]   10/14/2019  9:30 AM M[facility] CARDIOPULM REHAB RM 2 MTHZ CARDIAC [place]   10/16/2019  9:30 AM M[facility] CARDIOPULM REHAB RM 2 M[facility]Z CARDIAC [place]   10/17/2019  9:30 AM M[facility] CARDIOPULM REHAB RM 2 MTH[facility] CARDIAC [place]   10/21/2019  9:30 AM M[facility] CARDIOPULM REHAB RM 2 MTHZ CARDIAC [place]   10/23/2019  9:30 AM M[facility] CARDIOPULM REHAB RM 2 M[facility] CARDIAC [place]   10/24/2019  9:30 AM M[facility] CARDIOPULM REHAB RM 2 MTHZ CARDIAC [place]   10/28/2019  9:30 AM MTH CARDIOPULM REHAB RM 2 MTHZ CARDIAC [place]   10/30/2019  9:30 AM M[facility] CARDIOPULM REHAB RM 2 M[facility]Z CARDIAC [place]   10/31/2019  9:30 AM [facility] CARDIOPULM REHAB RM 2 [facility] CARDIAC [place]   11/4/2019  9:15 AM MD Lida Huitron [facility]   11/4/2019  9:30 AM M[facility] CARDIOPULM REHAB

## 2019-09-05 ENCOUNTER — HOSPITAL ENCOUNTER (OUTPATIENT)
Dept: CARDIAC REHAB | Age: 69
Setting detail: THERAPIES SERIES
Discharge: HOME | End: 2019-09-05
Payer: MEDICARE

## 2019-09-05 PROCEDURE — G0424 PULMONARY REHAB W EXER: HCPCS

## 2019-09-09 ENCOUNTER — HOSPITAL ENCOUNTER (OUTPATIENT)
Dept: CARDIAC REHAB | Age: 69
Setting detail: THERAPIES SERIES
Discharge: HOME | End: 2019-09-09
Payer: MEDICARE

## 2019-09-09 PROCEDURE — G0424 PULMONARY REHAB W EXER: HCPCS

## 2019-09-11 ENCOUNTER — HOSPITAL ENCOUNTER (OUTPATIENT)
Dept: CARDIAC REHAB | Age: 69
Setting detail: THERAPIES SERIES
Discharge: HOME | End: 2019-09-11
Payer: MEDICARE

## 2019-09-11 PROCEDURE — G0424 PULMONARY REHAB W EXER: HCPCS

## 2019-09-12 ENCOUNTER — HOSPITAL ENCOUNTER (OUTPATIENT)
Dept: CARDIAC REHAB | Age: 69
Setting detail: THERAPIES SERIES
Discharge: HOME | End: 2019-09-12
Payer: MEDICARE

## 2019-09-12 ENCOUNTER — CARE COORDINATION (OUTPATIENT)
Dept: CASE MANAGEMENT | Age: 69
End: 2019-09-12

## 2019-09-12 PROCEDURE — G0424 PULMONARY REHAB W EXER: HCPCS

## 2019-09-16 ENCOUNTER — HOSPITAL ENCOUNTER (OUTPATIENT)
Dept: CARDIAC REHAB | Age: 69
Setting detail: THERAPIES SERIES
Discharge: HOME | End: 2019-09-16
Payer: MEDICARE

## 2019-09-16 PROCEDURE — G0424 PULMONARY REHAB W EXER: HCPCS

## 2019-09-18 ENCOUNTER — HOSPITAL ENCOUNTER (OUTPATIENT)
Dept: CARDIAC REHAB | Age: 69
Setting detail: THERAPIES SERIES
Discharge: HOME | End: 2019-09-18
Payer: MEDICARE

## 2019-09-18 PROCEDURE — G0424 PULMONARY REHAB W EXER: HCPCS

## 2019-09-19 ENCOUNTER — HOSPITAL ENCOUNTER (OUTPATIENT)
Dept: CARDIAC REHAB | Age: 69
Setting detail: THERAPIES SERIES
Discharge: HOME | End: 2019-09-19
Payer: MEDICARE

## 2019-09-19 PROCEDURE — G0424 PULMONARY REHAB W EXER: HCPCS

## 2019-09-19 NOTE — PROGRESS NOTES
Pulmonary Rehab Individualized Treatment Plan-30 day    Patient Name: Nu Mckeon  Date of Initial Assessment: 9/19/2019  ACCOUNT #: [de-identified]  Diagnosis: Chronic Interstitial Lung Disease   Date of last  Visit 9/10/19  Onset Date: 8/7/19  Referring Physician: Felice Alicea  Risk Stratification: moderate  Session Number: 11     EXERCISE    Stages of Change:   [] pre-contemplation   [x] Action   [] Contemplate   [] Maintainence   [] Prep   [] Relapse           Exercise Prescription:  Mode: [x] TM [x] B [x] STP [] EL [x] R  Frequency: 3 times weekly  Duration: 31-60 minutes  Intensity: 1.8-2.0 METS  Progression:  30-50min total exercise by increasing 1-2 level/wk and/or 1-2min/wk to maintain SaO2 > 90%, achieve THR and RPE 11-13. Pt has increased Mets from an initial of 1.8 to a current of 2.0  SPO2: >90%  [x] O2 - Liters: 3-6 lpm  [] Resistance Training   Weights (lbs):              Reps:      Hypertension:  [] Yes  [x] No  Resting BP: 122/64  Peak Exercise BP: 118/62  [] Med change? Intervention:  Home Exercise:  Type: walking  Duration: 30 minutes  Frequency: daily  O2 (L/min): 3-6 lpm   [] Resistance Training    Education:   [x] Equipment [place]  [x] PLB     [x] Ex Safety   [x] S/S to report    [x] Warm up/ Cool down  [x] O2 Therapy    [x] RPE Scale   [] Energy conservation   [x] Dyspnea scale  [] Exercise specialist class-Home exercise         Target Goal:   - SPO2 > 90 during exercise  - Individual exercise Rx      Nutrition    Stages of Change:   [] pre-contemplation   [x] Action   [] Contemplate   [] Maintainence   [] Prep   [] Relapse     Diabetes:  [] Yes  [x] No  BS:   HbA1c:  Random BS:  BS in range: [] yes [] no  [] Med change? Weight Management:  Current Wt: 117.4  Wt Goal:     Dietary Goal:         Intervention:   [x] Dietitian Consult Rate your plate & Food diary completed and returned to Dietician for review & recommendations. Recommendations given to pt.  Pt declines an appointment with 1. 3  · Introduce weights/ therabands 1-2# for 5-10 reps. Staff will introduce at a later session. · Less SOB on exertion by utilizing pursed-lip breathing. PLB instructed/demonstrated to pt. Pt voices understanding with Pt utilizing PLB when appropriate. · Proper use of inhalers, O2 therapy and meds. Reviewed all medications with pt. All questions answered. · Increased knowledge of COPD and optimal management. Education continues. · [place] with chronic health changes. Education continues  · Manage stress by utilizing relaxation techniques. Deep breathing & PLB instructed/demonstrated to pt. · Increased knowledge of health eating. Rate your plate & food diary completed and sent to Dietician for review & recommendations. Pt refuses appointment with the S[facility] Dong. · Improve/Maintain quality of life. Maintained at current. · Manage/Control blood pressure.   BP remains WNL       Physician Changes/Comments:      Pulmonary Rehab Staff

## 2019-09-23 ENCOUNTER — HOSPITAL ENCOUNTER (OUTPATIENT)
Dept: CARDIAC REHAB | Age: 69
Setting detail: THERAPIES SERIES
Discharge: HOME | End: 2019-09-23
Payer: MEDICARE

## 2019-09-23 PROCEDURE — G0424 PULMONARY REHAB W EXER: HCPCS

## 2019-09-25 ENCOUNTER — HOSPITAL ENCOUNTER (OUTPATIENT)
Dept: CARDIAC REHAB | Age: 69
Setting detail: THERAPIES SERIES
Discharge: HOME | End: 2019-09-25
Payer: MEDICARE

## 2019-09-25 PROCEDURE — G0424 PULMONARY REHAB W EXER: HCPCS

## 2019-09-26 ENCOUNTER — HOSPITAL ENCOUNTER (OUTPATIENT)
Dept: CARDIAC REHAB | Age: 69
Setting detail: THERAPIES SERIES
Discharge: HOME | End: 2019-09-26
Payer: MEDICARE

## 2019-09-26 PROCEDURE — G0424 PULMONARY REHAB W EXER: HCPCS

## 2019-10-02 ENCOUNTER — HOSPITAL ENCOUNTER (OUTPATIENT)
Dept: CARDIAC REHAB | Age: 69
Setting detail: THERAPIES SERIES
Discharge: HOME | End: 2019-10-02
Payer: MEDICARE

## 2019-10-02 PROCEDURE — G0424 PULMONARY REHAB W EXER: HCPCS

## 2019-10-07 ENCOUNTER — HOSPITAL ENCOUNTER (OUTPATIENT)
Dept: CARDIAC REHAB | Age: 69
Setting detail: THERAPIES SERIES
Discharge: HOME | End: 2019-10-07
Payer: MEDICARE

## 2019-10-07 PROCEDURE — G0424 PULMONARY REHAB W EXER: HCPCS

## 2019-10-09 ENCOUNTER — HOSPITAL ENCOUNTER (OUTPATIENT)
Dept: CARDIAC REHAB | Age: 69
Setting detail: THERAPIES SERIES
Discharge: HOME | End: 2019-10-09
Payer: MEDICARE

## 2019-10-09 PROCEDURE — G0424 PULMONARY REHAB W EXER: HCPCS

## 2019-10-10 ENCOUNTER — HOSPITAL ENCOUNTER (OUTPATIENT)
Dept: CARDIAC REHAB | Age: 69
Setting detail: THERAPIES SERIES
Discharge: HOME | End: 2019-10-10
Payer: MEDICARE

## 2019-10-10 PROCEDURE — G0424 PULMONARY REHAB W EXER: HCPCS

## 2019-10-14 ENCOUNTER — HOSPITAL ENCOUNTER (OUTPATIENT)
Dept: CARDIAC REHAB | Age: 69
Setting detail: THERAPIES SERIES
Discharge: HOME | End: 2019-10-14
Payer: MEDICARE

## 2019-10-14 PROCEDURE — G0424 PULMONARY REHAB W EXER: HCPCS

## 2019-10-16 ENCOUNTER — HOSPITAL ENCOUNTER (OUTPATIENT)
Dept: CARDIAC REHAB | Age: 69
Setting detail: THERAPIES SERIES
Discharge: HOME | End: 2019-10-16
Payer: MEDICARE

## 2019-10-16 PROCEDURE — G0424 PULMONARY REHAB W EXER: HCPCS

## 2019-10-21 ENCOUNTER — HOSPITAL ENCOUNTER (OUTPATIENT)
Dept: CARDIAC REHAB | Age: 69
Setting detail: THERAPIES SERIES
Discharge: HOME | End: 2019-10-21
Payer: MEDICARE

## 2019-10-21 PROCEDURE — G0424 PULMONARY REHAB W EXER: HCPCS

## 2019-10-23 ENCOUNTER — HOSPITAL ENCOUNTER (OUTPATIENT)
Dept: CARDIAC REHAB | Age: 69
Setting detail: THERAPIES SERIES
Discharge: HOME | End: 2019-10-23
Payer: MEDICARE

## 2019-10-23 PROCEDURE — G0424 PULMONARY REHAB W EXER: HCPCS

## 2019-10-24 ENCOUNTER — HOSPITAL ENCOUNTER (OUTPATIENT)
Dept: CARDIAC REHAB | Age: 69
Setting detail: THERAPIES SERIES
Discharge: HOME | End: 2019-10-24
Payer: MEDICARE

## 2019-10-24 PROCEDURE — G0424 PULMONARY REHAB W EXER: HCPCS

## 2019-10-28 ENCOUNTER — HOSPITAL ENCOUNTER (OUTPATIENT)
Dept: CARDIAC REHAB | Age: 69
Setting detail: THERAPIES SERIES
Discharge: HOME | End: 2019-10-28
Payer: MEDICARE

## 2019-10-28 PROCEDURE — G0424 PULMONARY REHAB W EXER: HCPCS

## 2019-10-30 ENCOUNTER — HOSPITAL ENCOUNTER (OUTPATIENT)
Dept: CARDIAC REHAB | Age: 69
Setting detail: THERAPIES SERIES
Discharge: HOME | End: 2019-10-30
Payer: MEDICARE

## 2019-10-30 PROCEDURE — G0424 PULMONARY REHAB W EXER: HCPCS

## 2019-11-04 ENCOUNTER — HOSPITAL ENCOUNTER (OUTPATIENT)
Dept: CARDIAC REHAB | Age: 69
Setting detail: THERAPIES SERIES
Discharge: HOME | End: 2019-11-04
Payer: MEDICARE

## 2019-11-04 ENCOUNTER — OFFICE VISIT (OUTPATIENT)
Dept: PULMONOLOGY | Age: 69
End: 2019-11-04
Payer: MEDICARE

## 2019-11-04 VITALS
TEMPERATURE: 98.8 F | HEIGHT: 60 IN | BODY MASS INDEX: 23.16 KG/M2 | DIASTOLIC BLOOD PRESSURE: 67 MMHG | SYSTOLIC BLOOD PRESSURE: 118 MMHG | RESPIRATION RATE: 24 BRPM | HEART RATE: 91 BPM | OXYGEN SATURATION: 93 % | WEIGHT: 118 LBS

## 2019-11-04 DIAGNOSIS — J45.30 MILD PERSISTENT ASTHMA WITHOUT COMPLICATION: ICD-10-CM

## 2019-11-04 DIAGNOSIS — J96.12 CHRONIC HYPERCAPNIC RESPIRATORY FAILURE (HCC): ICD-10-CM

## 2019-11-04 DIAGNOSIS — J84.9 ILD (INTERSTITIAL LUNG DISEASE) (HCC): Primary | ICD-10-CM

## 2019-11-04 DIAGNOSIS — G47.33 OSA (OBSTRUCTIVE SLEEP APNEA): ICD-10-CM

## 2019-11-04 DIAGNOSIS — K21.9 GASTROESOPHAGEAL REFLUX DISEASE, ESOPHAGITIS PRESENCE NOT SPECIFIED: ICD-10-CM

## 2019-11-04 DIAGNOSIS — J47.9 BRONCHIECTASIS WITHOUT COMPLICATION (HCC): ICD-10-CM

## 2019-11-04 DIAGNOSIS — R09.82 POST-NASAL DRIP: ICD-10-CM

## 2019-11-04 DIAGNOSIS — M41.9 KYPHOSCOLIOSIS: ICD-10-CM

## 2019-11-04 DIAGNOSIS — M41.9 RESTRICTIVE LUNG DISEASE DUE TO KYPHOSCOLIOSIS: ICD-10-CM

## 2019-11-04 DIAGNOSIS — J98.4 RESTRICTIVE LUNG DISEASE DUE TO KYPHOSCOLIOSIS: ICD-10-CM

## 2019-11-04 PROCEDURE — G8482 FLU IMMUNIZE ORDER/ADMIN: HCPCS | Performed by: INTERNAL MEDICINE

## 2019-11-04 PROCEDURE — 1090F PRES/ABSN URINE INCON ASSESS: CPT | Performed by: INTERNAL MEDICINE

## 2019-11-04 PROCEDURE — G0424 PULMONARY REHAB W EXER: HCPCS

## 2019-11-04 PROCEDURE — 1036F TOBACCO NON-USER: CPT | Performed by: INTERNAL MEDICINE

## 2019-11-04 PROCEDURE — 1123F ACP DISCUSS/DSCN MKR DOCD: CPT | Performed by: INTERNAL MEDICINE

## 2019-11-04 PROCEDURE — G8427 DOCREV CUR MEDS BY ELIG CLIN: HCPCS | Performed by: INTERNAL MEDICINE

## 2019-11-04 PROCEDURE — 3017F COLORECTAL CA SCREEN DOC REV: CPT | Performed by: INTERNAL MEDICINE

## 2019-11-04 PROCEDURE — 99214 OFFICE O/P EST MOD 30 MIN: CPT | Performed by: INTERNAL MEDICINE

## 2019-11-04 PROCEDURE — G8420 CALC BMI NORM PARAMETERS: HCPCS | Performed by: INTERNAL MEDICINE

## 2019-11-04 PROCEDURE — 4040F PNEUMOC VAC/ADMIN/RCVD: CPT | Performed by: INTERNAL MEDICINE

## 2019-11-04 PROCEDURE — G8399 PT W/DXA RESULTS DOCUMENT: HCPCS | Performed by: INTERNAL MEDICINE

## 2019-11-07 ENCOUNTER — HOSPITAL ENCOUNTER (OUTPATIENT)
Dept: CARDIAC REHAB | Age: 69
Setting detail: THERAPIES SERIES
Discharge: HOME | End: 2019-11-07
Payer: MEDICARE

## 2019-11-07 PROCEDURE — G0424 PULMONARY REHAB W EXER: HCPCS

## 2019-11-11 ENCOUNTER — HOSPITAL ENCOUNTER (OUTPATIENT)
Dept: CARDIAC REHAB | Age: 69
Setting detail: THERAPIES SERIES
Discharge: HOME | End: 2019-11-11
Payer: MEDICARE

## 2019-11-11 PROCEDURE — G0424 PULMONARY REHAB W EXER: HCPCS

## 2019-11-14 ENCOUNTER — HOSPITAL ENCOUNTER (OUTPATIENT)
Dept: CARDIAC REHAB | Age: 69
Setting detail: THERAPIES SERIES
Discharge: HOME | End: 2019-11-14
Payer: MEDICARE

## 2019-11-14 PROCEDURE — G0424 PULMONARY REHAB W EXER: HCPCS

## 2019-11-18 ENCOUNTER — HOSPITAL ENCOUNTER (OUTPATIENT)
Dept: CARDIAC REHAB | Age: 69
Setting detail: THERAPIES SERIES
Discharge: HOME | End: 2019-11-18
Payer: MEDICARE

## 2019-11-18 PROCEDURE — G0424 PULMONARY REHAB W EXER: HCPCS

## 2019-11-19 PROBLEM — M06.9 RHEUMATOID ARTHRITIS INVOLVING MULTIPLE SITES (HCC): Status: ACTIVE | Noted: 2019-11-19

## 2019-11-20 ENCOUNTER — HOSPITAL ENCOUNTER (OUTPATIENT)
Dept: CARDIAC REHAB | Age: 69
Setting detail: THERAPIES SERIES
Discharge: HOME | End: 2019-11-20
Payer: MEDICARE

## 2019-11-20 PROCEDURE — G0424 PULMONARY REHAB W EXER: HCPCS

## 2019-11-21 ENCOUNTER — HOSPITAL ENCOUNTER (OUTPATIENT)
Dept: CARDIAC REHAB | Age: 69
Setting detail: THERAPIES SERIES
Discharge: HOME | End: 2019-11-21
Payer: MEDICARE

## 2019-11-21 PROCEDURE — G0424 PULMONARY REHAB W EXER: HCPCS

## 2019-11-25 ENCOUNTER — HOSPITAL ENCOUNTER (OUTPATIENT)
Dept: CARDIAC REHAB | Age: 69
Setting detail: THERAPIES SERIES
Discharge: HOME | End: 2019-11-25
Payer: MEDICARE

## 2019-11-25 PROCEDURE — G0424 PULMONARY REHAB W EXER: HCPCS

## 2019-11-27 ENCOUNTER — HOSPITAL ENCOUNTER (OUTPATIENT)
Dept: CARDIAC REHAB | Age: 69
Setting detail: THERAPIES SERIES
Discharge: HOME | End: 2019-11-27
Payer: MEDICARE

## 2019-11-27 PROCEDURE — G0424 PULMONARY REHAB W EXER: HCPCS

## 2019-12-02 ENCOUNTER — HOSPITAL ENCOUNTER (OUTPATIENT)
Dept: CARDIAC REHAB | Age: 69
Setting detail: THERAPIES SERIES
Discharge: HOME | End: 2019-12-02
Payer: MEDICARE

## 2019-12-02 PROCEDURE — G0424 PULMONARY REHAB W EXER: HCPCS

## 2019-12-04 ENCOUNTER — HOSPITAL ENCOUNTER (OUTPATIENT)
Dept: CARDIAC REHAB | Age: 69
Setting detail: THERAPIES SERIES
Discharge: HOME | End: 2019-12-04
Payer: MEDICARE

## 2019-12-04 PROCEDURE — G0424 PULMONARY REHAB W EXER: HCPCS

## 2019-12-09 ENCOUNTER — HOSPITAL ENCOUNTER (OUTPATIENT)
Age: 69
Discharge: HOME | End: 2019-12-09

## 2019-12-09 ENCOUNTER — HOSPITAL ENCOUNTER (OUTPATIENT)
Dept: CARDIAC REHAB | Age: 69
Discharge: HOME | End: 2019-12-09

## 2019-12-09 PROCEDURE — 9900000065 HC CARDIAC REHAB PHASE 3 - 1 VISIT

## 2019-12-18 ENCOUNTER — HOSPITAL ENCOUNTER (OUTPATIENT)
Dept: CARDIAC REHAB | Age: 69
Discharge: HOME | End: 2019-12-18

## 2019-12-19 ENCOUNTER — HOSPITAL ENCOUNTER (OUTPATIENT)
Dept: CARDIAC REHAB | Age: 69
Discharge: HOME | End: 2019-12-19

## 2019-12-25 ENCOUNTER — HOSPITAL ENCOUNTER (OUTPATIENT)
Dept: CARDIAC REHAB | Age: 69
Discharge: HOME | End: 2019-12-25

## 2019-12-26 ENCOUNTER — HOSPITAL ENCOUNTER (OUTPATIENT)
Dept: CARDIAC REHAB | Age: 69
Discharge: HOME | End: 2019-12-26

## 2020-01-02 ENCOUNTER — HOSPITAL ENCOUNTER (OUTPATIENT)
Dept: CARDIAC REHAB | Age: 70
Discharge: HOME | End: 2020-01-02

## 2020-01-08 ENCOUNTER — HOSPITAL ENCOUNTER (OUTPATIENT)
Dept: CARDIAC REHAB | Age: 70
Discharge: HOME | End: 2020-01-08

## 2020-01-09 ENCOUNTER — HOSPITAL ENCOUNTER (OUTPATIENT)
Dept: CARDIAC REHAB | Age: 70
Discharge: HOME | End: 2020-01-09

## 2020-01-16 ENCOUNTER — HOSPITAL ENCOUNTER (OUTPATIENT)
Dept: CARDIAC REHAB | Age: 70
Discharge: HOME | End: 2020-01-16

## 2020-01-22 PROCEDURE — 9900000065 HC CARDIAC REHAB PHASE 3 - 1 VISIT

## 2020-01-23 ENCOUNTER — HOSPITAL ENCOUNTER (OUTPATIENT)
Dept: CARDIAC REHAB | Age: 70
Discharge: HOME | End: 2020-01-23

## 2020-01-27 ENCOUNTER — HOSPITAL ENCOUNTER (OUTPATIENT)
Dept: CARDIAC REHAB | Age: 70
Discharge: HOME | End: 2020-01-27

## 2020-02-03 ENCOUNTER — HOSPITAL ENCOUNTER (OUTPATIENT)
Dept: CARDIAC REHAB | Age: 70
Discharge: HOME | End: 2020-02-03

## 2020-02-05 ENCOUNTER — HOSPITAL ENCOUNTER (OUTPATIENT)
Dept: CARDIAC REHAB | Age: 70
Discharge: HOME | End: 2020-02-05

## 2020-02-10 ENCOUNTER — HOSPITAL ENCOUNTER (OUTPATIENT)
Dept: CARDIAC REHAB | Age: 70
Discharge: HOME | End: 2020-02-10

## 2020-02-10 PROCEDURE — 9900000059 HC CARDIAC REHAB PHASE 3 - 12 VISITS

## 2020-02-12 ENCOUNTER — HOSPITAL ENCOUNTER (OUTPATIENT)
Dept: CARDIAC REHAB | Age: 70
Discharge: HOME | End: 2020-02-12

## 2020-02-17 ENCOUNTER — HOSPITAL ENCOUNTER (OUTPATIENT)
Dept: CARDIAC REHAB | Age: 70
Discharge: HOME | End: 2020-02-17

## 2020-02-19 ENCOUNTER — HOSPITAL ENCOUNTER (OUTPATIENT)
Dept: CARDIAC REHAB | Age: 70
Discharge: HOME | End: 2020-02-19

## 2020-02-24 ENCOUNTER — HOSPITAL ENCOUNTER (OUTPATIENT)
Dept: CARDIAC REHAB | Age: 70
Discharge: HOME | End: 2020-02-24

## 2020-02-26 ENCOUNTER — HOSPITAL ENCOUNTER (OUTPATIENT)
Dept: CARDIAC REHAB | Age: 70
Discharge: HOME | End: 2020-02-26

## 2020-03-02 ENCOUNTER — HOSPITAL ENCOUNTER (OUTPATIENT)
Dept: CARDIAC REHAB | Age: 70
Discharge: HOME | End: 2020-03-02

## 2020-03-04 ENCOUNTER — HOSPITAL ENCOUNTER (OUTPATIENT)
Dept: CARDIAC REHAB | Age: 70
Discharge: HOME | End: 2020-03-04

## 2020-03-04 PROBLEM — M06.9 RHEUMATOID ARTHRITIS INVOLVING MULTIPLE SITES (HCC): Status: RESOLVED | Noted: 2019-11-19 | Resolved: 2020-03-04

## 2020-03-11 ENCOUNTER — HOSPITAL ENCOUNTER (OUTPATIENT)
Dept: CARDIAC REHAB | Age: 70
Discharge: HOME | End: 2020-03-11

## 2020-04-30 ENCOUNTER — TELEMEDICINE (OUTPATIENT)
Dept: PULMONOLOGY | Age: 70
End: 2020-04-30
Payer: MEDICARE

## 2020-04-30 PROCEDURE — 3017F COLORECTAL CA SCREEN DOC REV: CPT | Performed by: NURSE PRACTITIONER

## 2020-04-30 PROCEDURE — 1090F PRES/ABSN URINE INCON ASSESS: CPT | Performed by: NURSE PRACTITIONER

## 2020-04-30 PROCEDURE — 99213 OFFICE O/P EST LOW 20 MIN: CPT | Performed by: NURSE PRACTITIONER

## 2020-04-30 PROCEDURE — 1036F TOBACCO NON-USER: CPT | Performed by: NURSE PRACTITIONER

## 2020-04-30 PROCEDURE — 1123F ACP DISCUSS/DSCN MKR DOCD: CPT | Performed by: NURSE PRACTITIONER

## 2020-04-30 PROCEDURE — 4040F PNEUMOC VAC/ADMIN/RCVD: CPT | Performed by: NURSE PRACTITIONER

## 2020-04-30 PROCEDURE — G8399 PT W/DXA RESULTS DOCUMENT: HCPCS | Performed by: NURSE PRACTITIONER

## 2020-04-30 PROCEDURE — G8420 CALC BMI NORM PARAMETERS: HCPCS | Performed by: NURSE PRACTITIONER

## 2020-04-30 PROCEDURE — G8428 CUR MEDS NOT DOCUMENT: HCPCS | Performed by: NURSE PRACTITIONER

## 2020-04-30 ASSESSMENT — ENCOUNTER SYMPTOMS
ALLERGIC/IMMUNOLOGIC NEGATIVE: 1
EYES NEGATIVE: 1
GASTROINTESTINAL NEGATIVE: 1

## 2020-04-30 NOTE — PROGRESS NOTES
2020    TELEHEALTH EVALUATION -- Audio/Visual (During EMZVJ-07 public health emergency)    Patient and physician are located in their individual locations. This is visit is completed via [employer] application []/ [employer] me[x] / Telephone []     HPI:    Dannie Reyes (:  1950) has requested an audio/video evaluation for the following concern(s): ILD, Chronic hypoxemic respiratory failure on 4 LPM NC. Patient states she is doing well, continues on 4 LPM nasal cannula around the clock. Using albuterol 2x a day. Patient continues on BiPAP  every night, no compliance report avail for my review. Denies needing refills or supplies for BiPAP at this time. Medications:   Albuterol Neb: Q6H PRN- using BID    PFT 2019:   FVC 36.6% of predicted, FEV1 40.7% predicted, FEV1/% predicted, TLC 39.6% predicted, RV 43.5% predicted, DLCO 45.7% predicted. Spirometry with no obstruction. TLC is reduced indicating restriction. CT Chest 2019: Negative for Pulm Embolism, Pulm Fibrosis progressed compared to CT imagine 2016. Superimposed diffuse opacities could represent multifocal pneumonia. Enlargement of pulmonary artery suggestive of pulmonary arterial hypertension. Echo 2019: LVEF 64%, RVSP 68 mmHG, consistent with moderate pulm HTN, No significant valvular abnormalities,     Immunizations:   Influenza: 10/6/2019  Prevnar 13: 3/1/2017  PNA23: 2019        Review of Systems   Constitutional: Negative. HENT: Negative. Eyes: Negative. Respiratory:        No significant SOB outside of normal. No cough, no sputum. Cardiovascular: Negative. Gastrointestinal: Negative. Endocrine: Negative. Genitourinary: Negative. Musculoskeletal: Negative. Skin: Negative. Allergic/Immunologic: Negative. Neurological: Negative. Hematological: Negative. Psychiatric/Behavioral: Negative. Prior to Visit Medications    Medication Sig Taking?  Authorizing Provider   albuterol (PROVENTIL) (2.5 MG/3ML) 0.083% nebulizer solution Take 3 mLs by nebulization every 6 hours as needed for Wheezing Nebulizer treatment Yes Monica Doshi MD   pantoprazole (PROTONIX) 40 MG tablet Take 1 tablet by mouth daily as needed Yes Monica Doshi MD   levothyroxine (SYNTHROID) 175 MCG tablet Take 1 tablet by mouth Daily Yes Monica Doshi MD   sertraline (ZOLOFT) 50 MG tablet Take 1 tablet by mouth daily Yes Monica Doshi MD   fluticasone (FLONASE) 50 MCG/ACT nasal spray 1 spray by Nasal route daily Yes Aletha Diaz MD   Calcium Carbonate-Vitamin D (OYSTER SHELL CALCIUM/D) 500-200 MG-UNIT TABS 2 tabs daily Yes Monica Doshi MD   predniSONE (DELTASONE) 10 MG tablet 4 tabs daily for 3 days, 3 tabs daily for 3 days, 2 tabs daily for 3 days, 1 tabs daily for 3 days  Patient not taking: Reported on 4/29/2020  Monica Doshi MD   erythromycin [facility]) 5 MG/GM ophthalmic ointment apply into right eye at bedtime as directed  Historical Provider, MD   PROLENSA 0.07 % SOLN   Historical Provider, MD       Social History     Tobacco Use    Smoking status: Never Smoker    Smokeless tobacco: Never Used   Substance Use Topics    Alcohol use: Yes     Frequency: Monthly or less     Drinks per session: 1 or 2     Binge frequency: Never     Comment: occ    Drug use: No              RECORD REVIEW: Previous medical records were reviewed at today's visit. Wt Readings from Last 3 Encounters:   03/04/20 114 lb (51.7 kg)   01/15/20 118 lb (53.5 kg)   11/19/19 120 lb (54.4 kg)       Results for orders placed or performed during the hospital encounter of 08/27/19   Urine Culture   Result Value Ref Range    Specimen Description . CLEAN CATCH URINE     Special Requests NOT REPORTED     Culture NO GROWTH    Culture blood #1   Result Value Ref Range    Specimen Description . BLOOD     Special Requests LAC 20 ML     Culture NO GROWTH 5 DAYS    Culture blood #2   Result Value Ref Range    Specimen Description . BLOOD     Special Requests RAC 20 ML     Culture NO GROWTH 5 DAYS    Blood gas, venous   Result Value Ref Range    pH, Tyrel 7.325 7.32 - 7.42    pCO2, Tyrel 70.0 (HH) 39 - 55    pO2, Tyrel 27.9 (L) 30.0 - 50.0    HCO3, Venous 35.7 (H) 24.0 - 30.0 mmol/L    Positive Base Excess, Tyrel 6.9 (H) 0.0 - 2.0 mmol/L    Negative Base Excess, Tyrel NOT REPORTED 0.0 - 2.0 mmol/L    O2 Sat, Tyrel 45.7 (L) 60.0 - 85.0 %    Total Hb NOT REPORTED 12.0 - 16.0 g/dl    Oxyhemoglobin NOT REPORTED 95.0 - 98.0 %    Carboxyhemoglobin  0 - 5 %    Methemoglobin NOT REPORTED 0.0 - 1.9 %    Pt Temp NOT REPORTED     pH, Tyrel, Temp Adj NOT REPORTED 7.320 - 7.420    pCO2, Tyrel, Temp Adj NOT REPORTED 39.0 - 55.0 mmHg    pO2, Tyrel, Temp Adj NOT REPORTED 30.0 - 50.0 mmHg    O2 Device/Flow/% NOT REPORTED     Respiratory Rate NOT REPORTED     Patrice Test NOT REPORTED     Sample Site NOT REPORTED     Pt.  Position NOT REPORTED     Mode NOT REPORTED     Set Rate NOT REPORTED     Total Rate NOT REPORTED     VT NOT REPORTED     FIO2 NOT REPORTED     Peep/Cpap NOT REPORTED     PSV NOT REPORTED     Text for Respiratory NOT REPORTED     NOTIFICATION NOT REPORTED     NOTIFICATION TIME NOT REPORTED    CBC auto differential   Result Value Ref Range    WBC 8.1 3.5 - 11.3 k/uL    RBC 4.08 3.95 - 5.11 m/uL    Hemoglobin 12.1 11.9 - 15.1 g/dL    Hematocrit 38.8 36.3 - 47.1 %    MCV 95.1 82.6 - 102.9 fL    MCH 29.7 25.2 - 33.5 pg    MCHC 31.2 28.4 - 34.8 g/dL    RDW 13.2 11.8 - 14.4 %    Platelets 103 733 - 372 k/uL    MPV 9.7 8.1 - 13.5 fL    NRBC Automated 0.0 0.0 per 100 WBC    Differential Type NOT REPORTED     Seg Neutrophils 69 (H) 36 - 65 %    Lymphocytes 14 (L) 24 - 43 %    Monocytes 9 3 - 12 %    Eosinophils % 7 (H) 1 - 4 %    Basophils 1 0 - 2 %    Immature Granulocytes 0 0 %    Segs Absolute 5.65 1.50 - 8.10 k/uL    Absolute Lymph # 1.16 1.10 - 3.70 k/uL    Absolute Mono # 0.73 0.10 - 1.20 k/uL    Absolute Eos # 0.53 (H) 0.00 - 0.44 k/uL Basophils Absolute 0.04 0.00 - 0.20 k/uL    Absolute Immature Granulocyte <0.03 0.00 - 0.30 k/uL    WBC Morphology NOT REPORTED     RBC Morphology NOT REPORTED     Platelet Estimate NOT REPORTED    Comprehensive Metabolic Panel w/ Reflex to MG   Result Value Ref Range    Glucose 138 (H) 70 - 99 mg/dL    BUN 11 8 - 23 mg/dL    CREATININE 0.65 0.50 - 0.90 mg/dL    Bun/Cre Ratio 17 9 - 20    Calcium 9.0 8.6 - 10.4 mg/dL    Sodium 139 135 - 144 mmol/L    Potassium 4.2 3.7 - 5.3 mmol/L    Chloride 96 (L) 98 - 107 mmol/L    CO2 33 (H) 20 - 31 mmol/L    Anion Gap 10 9 - 17 mmol/L    Alkaline Phosphatase 70 35 - 104 U/L    ALT 7 5 - 33 U/L    AST 14 <32 U/L    Total Bilirubin 0.22 (L) 0.3 - 1.2 mg/dL    Total Protein 8.2 6.4 - 8.3 g/dL    Alb 3.3 (L) 3.5 - 5.2 g/dL    Albumin/Globulin Ratio 0.7 (L) 1.0 - 2.5    GFR Non-African American >60 >60 mL/min    GFR African American >60 >60 mL/min    GFR Comment          GFR Staging         Urinalysis   Result Value Ref Range    Color, UA YELLOW YELLOW    Turbidity UA CLEAR CLEAR    Glucose, Ur NEGATIVE NEGATIVE    Bilirubin Urine NEGATIVE NEGATIVE    Ketones, Urine NEGATIVE NEGATIVE    Specific Gravity, UA 1.020 1.010 - 1.020    Urine Hgb TRACE (A) NEGATIVE    pH, UA 6.0 5.0 - 9.0    Protein, UA NEGATIVE NEGATIVE    Urobilinogen, Urine Normal Normal    Nitrite, Urine NEGATIVE NEGATIVE    Leukocyte Esterase, Urine SMALL (A) NEGATIVE    Urinalysis Comments NOT REPORTED    Lactate, Sepsis   Result Value Ref Range    Lactic Acid, Sepsis 1.8 0.5 - 1.9 mmol/L    Lactic Acid, Sepsis, Whole Blood NOT REPORTED 0.5 - 1.9 mmol/L   Lactate, Sepsis   Result Value Ref Range    Lactic Acid, Sepsis 0.6 0.5 - 1.9 mmol/L    Lactic Acid, Sepsis, Whole Blood NOT REPORTED 0.5 - 1.9 mmol/L   APTT   Result Value Ref Range    PTT 25.2 23.2 - 34.4 sec   Protime-INR   Result Value Ref Range    Protime 10.6 9.7 - 12.2 sec    INR 1.0 0.9 - 1.2   Lipase   Result Value Ref Range    Lipase 40 13 - 60 U/L Granulocytes 0 0 %    Segs Absolute 4.79 1.50 - 8.10 k/uL    Absolute Lymph # 1.28 1.10 - 3.70 k/uL    Absolute Mono # 0.77 0.10 - 1.20 k/uL    Absolute Eos # 0.51 (H) 0.00 - 0.44 k/uL    Basophils Absolute 0.04 0.00 - 0.20 k/uL    Absolute Immature Granulocyte <0.03 0.00 - 0.30 k/uL    WBC Morphology NOT REPORTED     RBC Morphology NOT REPORTED     Platelet Estimate NOT REPORTED    Comprehensive Metabolic Panel w/ Reflex to MG   Result Value Ref Range    Glucose 107 (H) 70 - 99 mg/dL    BUN 9 8 - 23 mg/dL    CREATININE 0.43 (L) 0.50 - 0.90 mg/dL    Bun/Cre Ratio 21 (H) 9 - 20    Calcium 8.2 (L) 8.6 - 10.4 mg/dL    Sodium 136 135 - 144 mmol/L    Potassium 4.1 3.7 - 5.3 mmol/L    Chloride 98 98 - 107 mmol/L    CO2 34 (H) 20 - 31 mmol/L    Anion Gap 4 (L) 9 - 17 mmol/L    Alkaline Phosphatase 49 35 - 104 U/L    ALT 9 5 - 33 U/L    AST 14 <32 U/L    Total Bilirubin 0.28 (L) 0.3 - 1.2 mg/dL    Total Protein 6.9 6.4 - 8.3 g/dL    Alb 2.8 (L) 3.5 - 5.2 g/dL    Albumin/Globulin Ratio 0.7 (L) 1.0 - 2.5    GFR Non-African American >60 >60 mL/min    GFR African American >60 >60 mL/min    GFR Comment          GFR Staging         Microscopic Urinalysis   Result Value Ref Range    -          WBC, UA 5 TO 10 0 - 5 /HPF    RBC, UA 0 TO 2 0 - 2 /HPF    Casts UA NOT REPORTED /LPF    Crystals, UA NOT REPORTED None /HPF    Epithelial Cells UA 2 TO 5 0 - 25 /HPF    Renal Epithelial, UA NOT REPORTED 0 /HPF    Bacteria, UA TRACE (A) None    Mucus, UA NOT REPORTED None    Trichomonas, UA NOT REPORTED None    Amorphous, UA NOT REPORTED None    Other Observations UA NOT REPORTED NOT REQ.     Yeast, UA NOT REPORTED None   Blood Gas, Arterial   Result Value Ref Range    pH, Arterial 7.296 (L) 7.35 - 7.45    pCO2, Arterial 73.4 (HH) 35 - 45 mmHg    pO2, Arterial 66.1 (L) 80 - 100 mmHg    HCO3, Arterial 35.0 (H) 22 - 26 mmol/L    Positive Base Excess, Art 5.6 (H) 0.0 - 2.0 mmol/L    Negative Base Excess, Art NOT REPORTED 0.0 - 2.0 mmol/L    O2 140 135 - 144 mmol/L    Potassium 4.9 3.7 - 5.3 mmol/L    Chloride 102 98 - 107 mmol/L    CO2 35 (H) 20 - 31 mmol/L    Anion Gap 3 (L) 9 - 17 mmol/L    Alkaline Phosphatase 41 35 - 104 U/L    ALT 10 5 - 33 U/L    AST 12 <32 U/L    Total Bilirubin 0.21 (L) 0.3 - 1.2 mg/dL    Total Protein 6.6 6.4 - 8.3 g/dL    Alb 2.6 (L) 3.5 - 5.2 g/dL    Albumin/Globulin Ratio 0.7 (L) 1.0 - 2.5    GFR Non-African American >60 >60 mL/min    GFR African American >60 >60 mL/min    GFR Comment          GFR Staging         CBC auto differential   Result Value Ref Range    WBC 6.0 3.5 - 11.3 k/uL    RBC 3.23 (L) 3.95 - 5.11 m/uL    Hemoglobin 9.5 (L) 11.9 - 15.1 g/dL    Hematocrit 32.3 (L) 36.3 - 47.1 %    .0 82.6 - 102.9 fL    MCH 29.4 25.2 - 33.5 pg    MCHC 29.4 28.4 - 34.8 g/dL    RDW 13.3 11.8 - 14.4 %    Platelets 071 396 - 830 k/uL    MPV 9.5 8.1 - 13.5 fL    NRBC Automated 0.0 0.0 per 100 WBC    Differential Type NOT REPORTED     Seg Neutrophils 62 36 - 65 %    Lymphocytes 26 24 - 43 %    Monocytes 11 3 - 12 %    Eosinophils % 1 1 - 4 %    Basophils 0 0 - 2 %    Immature Granulocytes 0 0 %    Segs Absolute 3.72 1.50 - 8.10 k/uL    Absolute Lymph # 1.53 1.10 - 3.70 k/uL    Absolute Mono # 0.63 0.10 - 1.20 k/uL    Absolute Eos # 0.05 0.00 - 0.44 k/uL    Basophils Absolute <0.03 0.00 - 0.20 k/uL    Absolute Immature Granulocyte <0.03 0.00 - 0.30 k/uL    WBC Morphology NOT REPORTED     RBC Morphology NOT REPORTED     Platelet Estimate NOT REPORTED    Comprehensive Metabolic Panel   Result Value Ref Range    Glucose 92 70 - 99 mg/dL    BUN 13 8 - 23 mg/dL    CREATININE 0.58 0.50 - 0.90 mg/dL    Bun/Cre Ratio 22 (H) 9 - 20    Calcium 8.8 8.6 - 10.4 mg/dL    Sodium 141 135 - 144 mmol/L    Potassium 4.8 3.7 - 5.3 mmol/L    Chloride 100 98 - 107 mmol/L    CO2 37 (H) 20 - 31 mmol/L    Anion Gap 4 (L) 9 - 17 mmol/L    Alkaline Phosphatase 42 35 - 104 U/L    ALT 12 5 - 33 U/L    AST 12 <32 U/L    Total Bilirubin 0.21 (L) 0.3 - 1.2 mg/dL    Total Protein 6.8 6.4 - 8.3 g/dL    Alb 2.7 (L) 3.5 - 5.2 g/dL    Albumin/Globulin Ratio 0.7 (L) 1.0 - 2.5    GFR Non-African American >60 >60 mL/min    GFR African American >60 >60 mL/min    GFR Comment          GFR Staging         EKG 12 Lead   Result Value Ref Range    Ventricular Rate 107 BPM    Atrial Rate 107 BPM    P-R Interval 132 ms    QRS Duration 76 ms    Q-T Interval 326 ms    QTc Calculation (Bazett) 435 ms    P Axis 54 degrees    R Axis 58 degrees    T Axis 26 degrees       No results found. PHYSICAL EXAMINATION:  Due to this being a TeleHealth encounter, evaluation of the following organ systems is limited: Vitals/Constitutional/EENT/Resp/CV/GI//MS/Neuro/Skin/Heme-Lymph-Imm. Constitutional: [x] Appears well-developed and well-nourished. Oxygen cannula present. [] Abnormal  Mental status  [x] Alert and awake  [x] Oriented to person/place/time [x]Able to follow commands    [x] No apparent distress      Eyes:  EOM    [x]  Normal  [] Abnormal-  Sclera  [x]  Normal  [] Abnormal -         Discharge [x]  None visible  [] Abnormal -    HENT:   [x] Normocephalic, atraumatic. [] Abnormal shaped head   [x] Mouth/Throat: Mucous membranes are moist.     Ears [x] Normal  [] Abnormal-    Neck: [x] Normal range of motion [x] Supple [x] No visualized mass. Pulmonary/Chest: [x] Respiratory effort normal.  [x] No visualized signs of difficulty breathing or respiratory distress        [] Abnormal      Musculoskeletal:   [x] Normal range of motion. [x] Normal gait with no signs of ataxia. [x]  No signs of cyanosis of the peripheral portions of extremities.          [] Abnormal       Neurological:        [x] Normal cranial nerve (limited exam to video visit) [x] No focal weakness observed       [] Abnormal          Speech       [x] Normal   [] Abnormal     Skin:        [x] No rash on visible skin  [x] Normal  [] Abnormal     Psychiatric:       [x] Normal  [] Abnormal        [x] Normal Mood [] Anxious appearing      Other pertinent exam findings:-        ASSESSMENT:  1. ILD (interstitial lung disease) ([facility] [address]    2. Chronic hypercapnic respiratory failure     3. 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    4. Mild persistent asthma without complication    5. Kyphoscoliosis    6. BAYRON (obstructive sleep apnea)          Plan:   1. Medications reviewed. Albuterol PRN  2. Refills were provided - no  3. Educated and clarified the medication use. 4. Maintain an active lifestyle. 5. Patient's questions were answered to her satisfaction. 6. Supplemental oxygen was continued at 4-5 LPM NC   7. Pulmonary function tests were reviewed   8. CT scan of the chest was reviewed  9. No compliance report avail for my review, patient reports ongoing compliance with no issues. 10. We'll see the patient back in 6 months       An  electronic signature was used to authenticate this note. --MANJINDER Shafer - CNP on 4/30/2020 at 3:23 PM    9}    Pursuant to the emergency declaration under the [facility], [facility] waiver authority and the [employer] Act, this Virtual  Visit was conducted, with patient's consent, to reduce the patient's risk of exposure to COVID-19 and provide continuity of care for an established patient. Services were provided through a video synchronous discussion virtually to substitute for in-person clinic visit.

## 2020-09-15 ENCOUNTER — HOSPITAL ENCOUNTER (OUTPATIENT)
Dept: PULMONOLOGY | Age: 70
Discharge: HOME | End: 2020-09-15
Payer: MEDICARE

## 2020-09-15 ENCOUNTER — HOSPITAL ENCOUNTER (OUTPATIENT)
Age: 70
Discharge: HOME | End: 2020-09-15
Payer: MEDICARE

## 2020-09-15 LAB
ABSOLUTE EOS #: 0.24 K/UL (ref 0–0.44)
ABSOLUTE IMMATURE GRANULOCYTE: <0.03 K/UL (ref 0–0.3)
ABSOLUTE LYMPH #: 1.15 K/UL (ref 1.1–3.7)
ABSOLUTE MONO #: 0.45 K/UL (ref 0.1–1.2)
ALBUMIN SERPL-MCNC: 3.7 G/DL (ref 3.5–5.2)
ALBUMIN/GLOBULIN RATIO: 1 (ref 1–2.5)
ALLEN TEST: ABNORMAL
ALP BLD-CCNC: 52 U/L (ref 35–104)
ALT SERPL-CCNC: 14 U/L (ref 5–33)
ANION GAP SERPL CALCULATED.3IONS-SCNC: 6 MMOL/L (ref 9–17)
AST SERPL-CCNC: 19 U/L
BASOPHILS # BLD: 1 % (ref 0–2)
BASOPHILS ABSOLUTE: 0.03 K/UL (ref 0–0.2)
BILIRUB SERPL-MCNC: 0.59 MG/DL (ref 0.3–1.2)
BUN BLDV-MCNC: 13 MG/DL (ref 8–23)
BUN/CREAT BLD: 26 (ref 9–20)
CALCIUM SERPL-MCNC: 9.5 MG/DL (ref 8.6–10.4)
CARBOXYHEMOGLOBIN: ABNORMAL % (ref 0–5)
CHLORIDE BLD-SCNC: 96 MMOL/L (ref 98–107)
CHOLESTEROL, FASTING: 162 MG/DL
CHOLESTEROL/HDL RATIO: 2.4
CO2: 39 MMOL/L (ref 20–31)
CREAT SERPL-MCNC: 0.5 MG/DL (ref 0.5–0.9)
DIFFERENTIAL TYPE: ABNORMAL
EOSINOPHILS RELATIVE PERCENT: 4 % (ref 1–4)
FIO2: ABNORMAL
GFR AFRICAN AMERICAN: >60 ML/MIN
GFR NON-AFRICAN AMERICAN: >60 ML/MIN
GFR SERPL CREATININE-BSD FRML MDRD: ABNORMAL ML/MIN/{1.73_M2}
GFR SERPL CREATININE-BSD FRML MDRD: ABNORMAL ML/MIN/{1.73_M2}
GLUCOSE FASTING: 91 MG/DL (ref 70–99)
HCO3 ARTERIAL: 38.9 MMOL/L (ref 22–26)
HCT VFR BLD CALC: 43.2 % (ref 36.3–47.1)
HDLC SERPL-MCNC: 68 MG/DL
HEMOGLOBIN: 13.5 G/DL (ref 11.9–15.1)
IMMATURE GRANULOCYTES: 0 %
LDL CHOLESTEROL: 83 MG/DL (ref 0–130)
LYMPHOCYTES # BLD: 21 % (ref 24–43)
MCH RBC QN AUTO: 32.2 PG (ref 25.2–33.5)
MCHC RBC AUTO-ENTMCNC: 31.3 G/DL (ref 28.4–34.8)
MCV RBC AUTO: 103.1 FL (ref 82.6–102.9)
METHEMOGLOBIN: ABNORMAL % (ref 0–1.9)
MODE: ABNORMAL
MONOCYTES # BLD: 8 % (ref 3–12)
NEGATIVE BASE EXCESS, ART: ABNORMAL MMOL/L (ref 0–2)
NOTIFICATION TIME: ABNORMAL
NOTIFICATION: ABNORMAL
NRBC AUTOMATED: 0 PER 100 WBC
O2 DEVICE/FLOW/%: ABNORMAL
O2 SAT, ARTERIAL: 92.8 % (ref 95–98)
OXYHEMOGLOBIN: ABNORMAL % (ref 95–98)
PATIENT TEMP: ABNORMAL
PCO2 ARTERIAL: 76.3 MMHG (ref 35–45)
PCO2, ART, TEMP ADJ: ABNORMAL
PDW BLD-RTO: 12.6 % (ref 11.8–14.4)
PEEP/CPAP: ABNORMAL
PH ARTERIAL: 7.33 (ref 7.35–7.45)
PH, ART, TEMP ADJ: ABNORMAL (ref 7.35–7.45)
PLATELET # BLD: 143 K/UL (ref 138–453)
PLATELET ESTIMATE: ABNORMAL
PMV BLD AUTO: 9.5 FL (ref 8.1–13.5)
PO2 ARTERIAL: 72.5 MMHG (ref 80–100)
PO2, ART, TEMP ADJ: ABNORMAL MMHG (ref 80–100)
POSITIVE BASE EXCESS, ART: 9.4 MMOL/L (ref 0–2)
POTASSIUM SERPL-SCNC: 5.1 MMOL/L (ref 3.7–5.3)
PSV: ABNORMAL
PT. POSITION: ABNORMAL
RBC # BLD: 4.19 M/UL (ref 3.95–5.11)
RBC # BLD: ABNORMAL 10*6/UL
RESPIRATORY RATE: 20
SAMPLE SITE: ABNORMAL
SEG NEUTROPHILS: 66 % (ref 36–65)
SEGMENTED NEUTROPHILS ABSOLUTE COUNT: 3.72 K/UL (ref 1.5–8.1)
SET RATE: ABNORMAL
SODIUM BLD-SCNC: 141 MMOL/L (ref 135–144)
TEXT FOR RESPIRATORY: ABNORMAL
TOTAL HB: ABNORMAL G/DL (ref 12–16)
TOTAL PROTEIN: 7.5 G/DL (ref 6.4–8.3)
TOTAL RATE: ABNORMAL
TRIGLYCERIDE, FASTING: 55 MG/DL
TSH SERPL DL<=0.05 MIU/L-ACNC: 4.31 MIU/L (ref 0.3–5)
VLDLC SERPL CALC-MCNC: NORMAL MG/DL (ref 1–30)
VT: ABNORMAL
WBC # BLD: 5.6 K/UL (ref 3.5–11.3)
WBC # BLD: ABNORMAL 10*3/UL

## 2020-09-15 PROCEDURE — 82805 BLOOD GASES W/O2 SATURATION: CPT

## 2020-09-15 PROCEDURE — 36415 COLL VENOUS BLD VENIPUNCTURE: CPT

## 2020-09-15 PROCEDURE — 80061 LIPID PANEL: CPT

## 2020-09-15 PROCEDURE — 84443 ASSAY THYROID STIM HORMONE: CPT

## 2020-09-15 PROCEDURE — 80053 COMPREHEN METABOLIC PANEL: CPT

## 2020-09-15 PROCEDURE — 85025 COMPLETE CBC W/AUTO DIFF WBC: CPT

## 2020-10-19 ENCOUNTER — OFFICE VISIT (OUTPATIENT)
Dept: PULMONOLOGY | Age: 70
End: 2020-10-19
Payer: MEDICARE

## 2020-10-19 VITALS
RESPIRATION RATE: 20 BRPM | BODY MASS INDEX: 22.05 KG/M2 | SYSTOLIC BLOOD PRESSURE: 121 MMHG | HEART RATE: 92 BPM | OXYGEN SATURATION: 99 % | TEMPERATURE: 98.8 F | DIASTOLIC BLOOD PRESSURE: 67 MMHG | WEIGHT: 112.3 LBS | HEIGHT: 60 IN

## 2020-10-19 PROCEDURE — 99214 OFFICE O/P EST MOD 30 MIN: CPT | Performed by: INTERNAL MEDICINE

## 2020-10-19 PROCEDURE — G8427 DOCREV CUR MEDS BY ELIG CLIN: HCPCS | Performed by: INTERNAL MEDICINE

## 2020-10-19 PROCEDURE — 3017F COLORECTAL CA SCREEN DOC REV: CPT | Performed by: INTERNAL MEDICINE

## 2020-10-19 PROCEDURE — 99213 OFFICE O/P EST LOW 20 MIN: CPT | Performed by: INTERNAL MEDICINE

## 2020-10-19 PROCEDURE — G8399 PT W/DXA RESULTS DOCUMENT: HCPCS | Performed by: INTERNAL MEDICINE

## 2020-10-19 PROCEDURE — 4040F PNEUMOC VAC/ADMIN/RCVD: CPT | Performed by: INTERNAL MEDICINE

## 2020-10-19 PROCEDURE — 1036F TOBACCO NON-USER: CPT | Performed by: INTERNAL MEDICINE

## 2020-10-19 PROCEDURE — G8420 CALC BMI NORM PARAMETERS: HCPCS | Performed by: INTERNAL MEDICINE

## 2020-10-19 PROCEDURE — G8484 FLU IMMUNIZE NO ADMIN: HCPCS | Performed by: INTERNAL MEDICINE

## 2020-10-19 PROCEDURE — 1123F ACP DISCUSS/DSCN MKR DOCD: CPT | Performed by: INTERNAL MEDICINE

## 2020-10-19 PROCEDURE — 1090F PRES/ABSN URINE INCON ASSESS: CPT | Performed by: INTERNAL MEDICINE

## 2020-10-19 NOTE — PATIENT INSTRUCTIONS
SURVEY:    You may be receiving a survey from [employer] regarding your visit today. Please complete the survey to enable us to provide the highest quality of care to you and your family. If you cannot score us a very good on any question, please call the office to discuss how we could have made your experience a very good one. Thank you.   Patient Education

## 2020-10-19 NOTE — PROGRESS NOTES
PULMONARY OP  PROGRESS NOTE      Patient:  Sunil Benavides  YOB: 1950    MRN: [id number]     Acct:        Pt seen and Chart reviewed. Ms. Sunil Benavides is here in followup for    Diagnosis Orders   1. ILD (interstitial lung disease) ([address]     2. Chronic hypercapnic respiratory failure      3. 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     4. Mild persistent asthma without complication     5. Kyphoscoliosis     6. BAYRON (obstructive sleep apnea)     7. Post-nasal drip     8. Bronchiectasis without complication ([address]     9. Gastroesophageal reflux disease, unspecified whether esophagitis present         Patient has not had any hospitalizations or ER visits since Last visit  Has been using meds as recommended. She has been using the oxygen but stopped using the BiPAP for the past 1 month  Denies any headaches  She does have some shortness of breath that has been progressively getting worse  She has been using supplemental oxygen  Has occasional cough that improves with albuterol use. Denied any nasal drainage or heartburn. Has intermittent shortness of breath but not much wheezing. Has been using oxygen. She has difficulty breathing when she eats a bigger meal  No chest pain or pressure. No acid reflux symptoms. No hemoptysis. No fevers or chills or night sweats.   Not using BiPAP at a pressure of 15/9 cm of water every night for the past 1 month  Other than shortness of breath she denied having much of daytime sleepiness or fatigue or tiredness  Has some nasal discharge with oxygen use  No epistaxis  [facility] for lung [facility] and was not considered a candidate for lung transplantation    Subjective:   Review of Systems -   General ROS: Completed and except as mentioned above were negative   Psychological ROS:  Completed and except as mentioned above were negative  Ophthalmic ROS: Completed and except as mentioned above were negative  ENT ROS:  Completed and except as mentioned above were negative  Allergy and Immunology ROS:  Completed and except as mentioned above were negative  Hematological and Lymphatic ROS:  Completed and except as mentioned above were negative  Endocrine ROS: Completed and except as mentioned above were negative  Breast ROS:  Completed and except as mentioned above were negative  Respiratory ROS:  Completed and except as mentioned above were negative  Cardiovascular ROS:  Completed and except as mentioned above were negative  Gastrointestinal ROS: Completed and except as mentioned above were negative  Genito-Urinary ROS:  Completed and except as mentioned above were negative  Musculoskeletal ROS:  Completed and except as mentioned above were negative  Neurological ROS:  Completed and except as mentioned above were negative  Dermatological ROS:  Completed and except as mentioned above were negative    Allergies:   Allergies   Allergen Reactions    Lactose Intolerance (Gi)        Medications:    Current Outpatient Medications:     levothyroxine (SYNTHROID) 175 MCG tablet, Take 1 tablet by mouth Daily, Disp: 90 tablet, Rfl: 0    albuterol (PROVENTIL) (2.5 MG/3ML) 0.083% nebulizer solution, Take 3 mLs by nebulization every 6 hours as needed for Wheezing Nebulizer treatment [id number], Disp: 120 each, Rfl: 3    hydroxychloroquine (PLAQUENIL) 200 MG tablet, Take 200 mg by mouth daily , Disp: , Rfl:     sertraline (ZOLOFT) 50 MG tablet, Take 1 tablet by mouth daily, Disp: 30 tablet, Rfl: 11    pantoprazole (PROTONIX) 40 MG tablet, Take 1 tablet by mouth daily as needed, Disp: 30 tablet, Rfl: 2    fluticasone (FLONASE) 50 MCG/ACT nasal spray, 1 spray by Nasal route daily (Patient taking differently: 1 spray by Nasal route as needed ), Disp: 1 Bottle, Rfl: 3    Calcium Carbonate-Vitamin D (OYSTER SHELL CALCIUM/D) 500-200 MG-UNIT TABS, 2 tabs daily, Disp: 30 tablet, Rfl: 0      Objective:    Physical Exam:  Vitals: /67 (Site: Left Upper Arm, Position: Sitting, Cuff Size: Small Adult)   Pulse 92   Temp 98.8 °F (37.1 °C) (Tympanic)   Resp 20   Ht 5' (1.524 m)   Wt 112 lb 4.8 oz (50.9 kg)   SpO2 99%   BMI 21.93 kg/m²   Last 3 weights: Wt Readings from Last 3 Encounters:   10/19/20 112 lb 4.8 oz (50.9 kg)   08/18/20 116 lb (52.6 kg)   07/14/20 111 lb (50.3 kg)     Body mass index is 21.93 kg/m². PHYSICAL EXAMINATION:  Vitals:    10/19/20 1507   BP: 121/67   Site: Left Upper Arm   Position: Sitting   Cuff Size: Small Adult   Pulse: 92   Resp: 20   Temp: 98.8 °F (37.1 °C)   TempSrc: Tympanic   SpO2: 99%   Weight: 112 lb 4.8 oz (50.9 kg)   Height: 5' (1.524 m)     Constitutional: This is a well developed, well nourished, 25-29.9 - Overweight 78y.o. year old female who is alert, oriented, cooperative and in no apparent distress. Head:normocephalic and atraumatic. EENT: COLETTE. No conjunctival injections. Septum was midline, mucosa was without erythema, exudates or cobblestoning. No thrush was noted. Mallampati  II (soft palate, uvula, fauces visible)  Neck: Supple without thyromegaly. No elevated JVP. Trachea was midline. Respiratory: Chest was symmetrical without dullness to percussion. Breath sounds bilaterally were clear to auscultation. There were no wheezes, rhonchi , But has bibasilar rales about detention up. There is no intercostal retraction or use of accessory muscles. No egophony noted. Cardiovascular: Regular without murmur, clicks, gallops or rubs. Abdomen: Slightly rounded and soft without organomegaly. No rebound, rigidity or guarding was appreciated. Lymphatic: No lymphadenopathy. Musculoskeletal: Has kyphoscoliosis of the dorsal spine. No gross muscle weakness. Extremities:  No lower extremity edema, ulcerations, tenderness, varicosities or erythema. Muscle size, tone and strength are normal.  No involuntary movements are noted.     Skin: Warm and dry. Good color, turgor and pigmentation. No lesions or scars. No cyanosis or clubbing  Neurological/Psychiatric: The patient's general behavior, level of consciousness, thought content and emotional status is normal.        Labs:  CT scan of the chest was done in August 2019 and it showed progression of the pulmonary fibrosis, especially in the right side with bilateral pulmonary infiltrates    She had an ABG on September 15 that showed acute on chronic respiratory acidosis with a PCO2 of 76.3    She had a TSH since last visit that was normal        Assessment:   Diagnosis Orders   1. ILD (interstitial lung disease) ([address]     2. Chronic hypercapnic respiratory failure      3. 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     4. Mild persistent asthma without complication     5. Kyphoscoliosis     6. BAYRON (obstructive sleep apnea)     7. Post-nasal drip     8. Bronchiectasis without complication ([address]     9. Gastroesophageal reflux disease, unspecified whether esophagitis present              PLAN:      Refills provided-none  Educated and clarified the medication use. Recommend flu vaccination in the fall annually. Had flu vaccination for the season  Recommendations given regarding pneumococcal vaccinations. Patient is up-to-date with vaccinations from pulmonary perspective. Maintain an active lifestyle. Questions  Patient had were answered to her satisfaction. Supplemental oxygen was continued. Patient was educated on the importance of compliance and the benefits of oxygen use. Complications of oxygen use, including dryness of the nostrils, epistaxis and also the fire hazard were explained to the patient. Patient willingly accepts to use  the oxygen as recommended  Continue BiPAP at a pressure of 15/9 cm of water as it has improved Patient's symptomatology tremendously.   Patient was educated on the benefits of using the BiPAP and how her hospitalization risk would also be decreased given the chronic hypoxemic and hypercarbic respiratory failure  Maintain good sleep hygiene measures. Patient has been using the BiPAP as recommended and benefiting from its use. Not a candidate for lung cancer screening. Recommend eating multiple small meals. Was not considered a transplant candidate at [facility]  Recommended discussing with the family regarding end-of-life issues and goals of care. We'll see the patient back in 6 months or earlier if needed  She will call us if she is sick and need to be seen sooner  Thank you for having us involved in the care of your patient. Please call us if you have any questions or concerns.       Agusto Menchaca MD             10/19/2020, 7:31 PM

## 2021-01-22 PROBLEM — F32.5 MAJOR DEPRESSIVE DISORDER WITH SINGLE EPISODE, IN FULL REMISSION (HCC): Status: ACTIVE | Noted: 2021-01-22

## 2021-01-22 PROBLEM — J44.1 COPD WITH ACUTE EXACERBATION (HCC): Status: RESOLVED | Noted: 2019-08-28 | Resolved: 2021-01-22

## 2021-02-21 ENCOUNTER — APPOINTMENT (OUTPATIENT)
Dept: GENERAL RADIOLOGY | Age: 71
End: 2021-02-21
Payer: MEDICARE

## 2021-02-21 ENCOUNTER — HOSPITAL ENCOUNTER (EMERGENCY)
Age: 71
Discharge: HOME | End: 2021-02-21
Attending: FAMILY MEDICINE
Payer: MEDICARE

## 2021-02-21 VITALS
RESPIRATION RATE: 18 BRPM | BODY MASS INDEX: 23.16 KG/M2 | TEMPERATURE: 98.3 F | DIASTOLIC BLOOD PRESSURE: 62 MMHG | WEIGHT: 118 LBS | OXYGEN SATURATION: 100 % | HEART RATE: 96 BPM | HEIGHT: 60 IN | SYSTOLIC BLOOD PRESSURE: 114 MMHG

## 2021-02-21 DIAGNOSIS — S52.502A CLOSED FRACTURE OF DISTAL END OF LEFT RADIUS, UNSPECIFIED FRACTURE MORPHOLOGY, INITIAL ENCOUNTER: Primary | ICD-10-CM
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
## 2021-02-21 PROCEDURE — 73130 X-RAY EXAM OF HAND: CPT

## 2021-02-21 PROCEDURE — 29125 APPL SHORT ARM SPLINT STATIC: CPT

## 2021-02-21 PROCEDURE — 73110 X-RAY EXAM OF WRIST: CPT

## 2021-02-21 PROCEDURE — 99283 EMERGENCY DEPT VISIT LOW MDM: CPT

## 2021-02-21 RX ORDER — IBUPROFEN 600 MG/1
600 TABLET ORAL EVERY 6 HOURS PRN
Qty: 30 TABLET | Refills: 1 | Status: SHIPPED | OUTPATIENT
Start: 2021-02-21

## 2021-02-21 ASSESSMENT — ENCOUNTER SYMPTOMS
RESPIRATORY NEGATIVE: 1
GASTROINTESTINAL NEGATIVE: 1
EYES NEGATIVE: 1

## 2021-02-21 NOTE — ED NOTES
Pt and  instructed on how to care for splint.  Both verbalize understanding     Tia Martin, RN  02/21/21 0131
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
## 2021-02-21 NOTE — ED NOTES
Contacted Dr. Emeterio Whitlock ortho on call per Dr. Ivonne Townsend request.     Peter Sanjiv Sesay  02/21/21 6580

## 2021-03-01 ENCOUNTER — HOSPITAL ENCOUNTER (OUTPATIENT)
Dept: NON INVASIVE DIAGNOSTICS | Age: 71
Discharge: HOME | End: 2021-03-01
Payer: MEDICARE

## 2021-03-01 ENCOUNTER — HOSPITAL ENCOUNTER (OUTPATIENT)
Dept: VASCULAR LAB | Age: 71
Discharge: HOME | End: 2021-03-03
Payer: MEDICARE

## 2021-03-01 DIAGNOSIS — R06.02 SOB (SHORTNESS OF BREATH): ICD-10-CM

## 2021-03-01 DIAGNOSIS — R22.43 LOCALIZED SWELLING OF BOTH LOWER LEGS: ICD-10-CM

## 2021-03-01 DIAGNOSIS — M79.89 LEG SWELLING: ICD-10-CM

## 2021-03-01 LAB
LV EF: 55 %
LVEF MODALITY: NORMAL

## 2021-03-01 PROCEDURE — 93970 EXTREMITY STUDY: CPT

## 2021-03-01 PROCEDURE — 93306 TTE W/DOPPLER COMPLETE: CPT

## 2021-03-09 PROBLEM — J96.11 CHRONIC RESPIRATORY FAILURE WITH HYPOXIA AND HYPERCAPNIA (HCC): Status: ACTIVE | Noted: 2017-08-11

## 2021-04-19 ENCOUNTER — OFFICE VISIT (OUTPATIENT)
Dept: PULMONOLOGY | Age: 71
End: 2021-04-19
Payer: MEDICARE

## 2021-04-19 VITALS
WEIGHT: 110 LBS | HEIGHT: 60 IN | OXYGEN SATURATION: 85 % | BODY MASS INDEX: 21.6 KG/M2 | DIASTOLIC BLOOD PRESSURE: 75 MMHG | SYSTOLIC BLOOD PRESSURE: 120 MMHG | TEMPERATURE: 99.2 F | RESPIRATION RATE: 24 BRPM | HEART RATE: 84 BPM

## 2021-04-19 DIAGNOSIS — K21.9 GASTROESOPHAGEAL REFLUX DISEASE, UNSPECIFIED WHETHER ESOPHAGITIS PRESENT: ICD-10-CM

## 2021-04-19 DIAGNOSIS — J47.9 BRONCHIECTASIS WITHOUT COMPLICATION (HCC): ICD-10-CM

## 2021-04-19 DIAGNOSIS — M41.9 RESTRICTIVE LUNG DISEASE DUE TO KYPHOSCOLIOSIS: ICD-10-CM

## 2021-04-19 DIAGNOSIS — J45.30 MILD PERSISTENT ASTHMA WITHOUT COMPLICATION: ICD-10-CM

## 2021-04-19 DIAGNOSIS — R09.82 POST-NASAL DRIP: ICD-10-CM

## 2021-04-19 DIAGNOSIS — G47.33 OSA (OBSTRUCTIVE SLEEP APNEA): ICD-10-CM

## 2021-04-19 DIAGNOSIS — J96.12 CHRONIC HYPERCAPNIC RESPIRATORY FAILURE (HCC): ICD-10-CM

## 2021-04-19 DIAGNOSIS — J84.9 ILD (INTERSTITIAL LUNG DISEASE) (HCC): Primary | ICD-10-CM

## 2021-04-19 DIAGNOSIS — M41.9 KYPHOSCOLIOSIS: ICD-10-CM

## 2021-04-19 DIAGNOSIS — J98.4 RESTRICTIVE LUNG DISEASE DUE TO KYPHOSCOLIOSIS: ICD-10-CM

## 2021-04-19 PROCEDURE — 1123F ACP DISCUSS/DSCN MKR DOCD: CPT | Performed by: INTERNAL MEDICINE

## 2021-04-19 PROCEDURE — G8399 PT W/DXA RESULTS DOCUMENT: HCPCS | Performed by: INTERNAL MEDICINE

## 2021-04-19 PROCEDURE — 99214 OFFICE O/P EST MOD 30 MIN: CPT | Performed by: INTERNAL MEDICINE

## 2021-04-19 PROCEDURE — 99213 OFFICE O/P EST LOW 20 MIN: CPT | Performed by: INTERNAL MEDICINE

## 2021-04-19 PROCEDURE — G8420 CALC BMI NORM PARAMETERS: HCPCS | Performed by: INTERNAL MEDICINE

## 2021-04-19 PROCEDURE — G8427 DOCREV CUR MEDS BY ELIG CLIN: HCPCS | Performed by: INTERNAL MEDICINE

## 2021-04-19 PROCEDURE — 1090F PRES/ABSN URINE INCON ASSESS: CPT | Performed by: INTERNAL MEDICINE

## 2021-04-19 PROCEDURE — 4040F PNEUMOC VAC/ADMIN/RCVD: CPT | Performed by: INTERNAL MEDICINE

## 2021-04-19 PROCEDURE — 3017F COLORECTAL CA SCREEN DOC REV: CPT | Performed by: INTERNAL MEDICINE

## 2021-04-19 PROCEDURE — 1036F TOBACCO NON-USER: CPT | Performed by: INTERNAL MEDICINE

## 2021-04-19 RX ORDER — FUROSEMIDE 20 MG/1
20 TABLET ORAL DAILY
COMMUNITY
End: 2021-06-21

## 2021-04-25 NOTE — PROGRESS NOTES
mentioned above were negative  ENT ROS:  Completed and except as mentioned above were negative  Allergy and Immunology ROS:  Completed and except as mentioned above were negative  Hematological and Lymphatic ROS:  Completed and except as mentioned above were negative  Endocrine ROS: Completed and except as mentioned above were negative  Breast ROS:  Completed and except as mentioned above were negative  Respiratory ROS:  Completed and except as mentioned above were negative  Cardiovascular ROS:  Completed and except as mentioned above were negative  Gastrointestinal ROS: Completed and except as mentioned above were negative  Genito-Urinary ROS:  Completed and except as mentioned above were negative  Musculoskeletal ROS:  Completed and except as mentioned above were negative  Neurological ROS:  Completed and except as mentioned above were negative  Dermatological ROS:  Completed and except as mentioned above were negative    Allergies:   Allergies   Allergen Reactions    Lactose Intolerance (Gi)        Medications:    Current Outpatient Medications:     furosemide (LASIX) 20 MG tablet, Take 20 mg by mouth daily, Disp: , Rfl:     spironolactone (ALDACTONE) 25 MG tablet, Take 1 tablet by mouth daily, Disp: 30 tablet, Rfl: 5    ibuprofen (ADVIL;MOTRIN) 600 MG tablet, Take 1 tablet by mouth every 6 hours as needed for Pain, Disp: 30 tablet, Rfl: 1    potassium chloride (KLOR-CON M) 20 MEQ extended release tablet, Take 1 tablet by mouth daily, Disp: 30 tablet, Rfl: 0    levothyroxine (SYNTHROID) 175 MCG tablet, Take 1 tablet by mouth Daily, Disp: 90 tablet, Rfl: 3    albuterol (PROVENTIL) (2.5 MG/3ML) 0.083% nebulizer solution, Take 3 mLs by nebulization every 6 hours as needed for Wheezing Nebulizer treatment DX:J44.9, Disp: 120 each, Rfl: 3    hydroxychloroquine (PLAQUENIL) 200 MG tablet, Take 200 mg by mouth daily , Disp: , Rfl:     sertraline (ZOLOFT) 50 MG tablet, Take 1 tablet by mouth daily, Disp: 30 tablet, Rfl: 11    pantoprazole (PROTONIX) 40 MG tablet, Take 1 tablet by mouth daily as needed, Disp: 30 tablet, Rfl: 2    fluticasone (FLONASE) 50 MCG/ACT nasal spray, 1 spray by Nasal route daily, Disp: 1 Bottle, Rfl: 3    Calcium Carbonate-Vitamin D (OYSTER SHELL CALCIUM/D) 500-200 MG-UNIT TABS, 2 tabs daily, Disp: 30 tablet, Rfl: 0      Objective:    Physical Exam:  Vitals: /75   Pulse 84   Temp 99.2 °F (37.3 °C)   Resp 24   Ht 5' (1.524 m)   Wt 110 lb (49.9 kg)   SpO2 (!) 85% Comment: o2 4lpm  BMI 21.48 kg/m²   Last 3 weights: Wt Readings from Last 3 Encounters:   04/19/21 110 lb (49.9 kg)   03/09/21 122 lb (55.3 kg)   02/21/21 118 lb (53.5 kg)     Body mass index is 21.48 kg/m². PHYSICAL EXAMINATION:  Vitals:    04/19/21 1442   BP: 120/75   Pulse: 84   Resp: 24   Temp: 99.2 °F (37.3 °C)   SpO2: (!) 85%   Weight: 110 lb (49.9 kg)   Height: 5' (1.524 m)     Constitutional: This is a well developed, well nourished, 25-29.9 - Overweight 73y.o. year old female who is alert, oriented, cooperative and in no apparent distress. Head:normocephalic and atraumatic. EENT: COLETTE. No conjunctival injections. Septum was midline, mucosa was without erythema, exudates or cobblestoning. No thrush was noted. Mallampati  II (soft palate, uvula, fauces visible)  Neck: Supple without thyromegaly. No elevated JVP. Trachea was midline. Respiratory: Chest was symmetrical without dullness to percussion. Breath sounds bilaterally were clear to auscultation. There were no wheezes, rhonchi , But has bibasilar rales about [facility] up. There is no intercostal retraction or use of accessory muscles. No egophony noted. Cardiovascular: Regular without murmur, clicks, gallops or rubs. Abdomen: Slightly rounded and soft without organomegaly. No rebound, rigidity or guarding was appreciated. Lymphatic: No lymphadenopathy. Musculoskeletal: Has kyphoscoliosis of the dorsal spine. No gross muscle weakness.

## 2021-05-05 ENCOUNTER — HOSPITAL ENCOUNTER (OUTPATIENT)
Age: 71
Discharge: HOME | End: 2021-05-05
Payer: MEDICARE

## 2021-05-05 ENCOUNTER — HOSPITAL ENCOUNTER (OUTPATIENT)
Dept: PULMONOLOGY | Age: 71
Discharge: HOME | End: 2021-05-05
Payer: MEDICARE

## 2021-05-05 DIAGNOSIS — I27.23 PULMONARY HYPERTENSION DUE TO HYPOXIA (HCC): ICD-10-CM

## 2021-05-05 DIAGNOSIS — J96.12 CHRONIC HYPERCAPNIC RESPIRATORY FAILURE (HCC): Primary | ICD-10-CM

## 2021-05-05 DIAGNOSIS — J84.112 IPF (IDIOPATHIC PULMONARY FIBROSIS) (HCC): ICD-10-CM

## 2021-05-05 LAB
ALLEN TEST: ABNORMAL
ANION GAP SERPL CALCULATED.3IONS-SCNC: ABNORMAL MMOL/L (ref 9–17)
BUN BLDV-MCNC: 18 MG/DL (ref 8–23)
BUN/CREAT BLD: 23 (ref 9–20)
CALCIUM SERPL-MCNC: 9 MG/DL (ref 8.6–10.4)
CARBOXYHEMOGLOBIN: ABNORMAL % (ref 0–5)
CHLORIDE BLD-SCNC: 93 MMOL/L (ref 98–107)
CO2: >45 MMOL/L (ref 20–31)
CREAT SERPL-MCNC: 0.8 MG/DL (ref 0.5–0.9)
FIO2: ABNORMAL
GFR AFRICAN AMERICAN: >60 ML/MIN
GFR NON-AFRICAN AMERICAN: >60 ML/MIN
GFR SERPL CREATININE-BSD FRML MDRD: ABNORMAL ML/MIN/{1.73_M2}
GFR SERPL CREATININE-BSD FRML MDRD: ABNORMAL ML/MIN/{1.73_M2}
GLUCOSE BLD-MCNC: 97 MG/DL (ref 70–99)
HCO3 ARTERIAL: 48.2 MMOL/L (ref 22–26)
METHEMOGLOBIN: ABNORMAL % (ref 0–1.9)
MODE: ABNORMAL
NEGATIVE BASE EXCESS, ART: ABNORMAL MMOL/L (ref 0–2)
NOTIFICATION TIME: ABNORMAL
NOTIFICATION: ABNORMAL
O2 DEVICE/FLOW/%: ABNORMAL
O2 SAT, ARTERIAL: 98.1 % (ref 95–98)
OXYHEMOGLOBIN: ABNORMAL % (ref 95–98)
PATIENT TEMP: 37
PCO2 ARTERIAL: 94.8 MMHG (ref 35–45)
PCO2, ART, TEMP ADJ: ABNORMAL
PEEP/CPAP: ABNORMAL
PH ARTERIAL: 7.23 (ref 7.35–7.45)
PH, ART, TEMP ADJ: ABNORMAL (ref 7.35–7.45)
PO2 ARTERIAL: 127.1 MMHG (ref 80–100)
PO2, ART, TEMP ADJ: ABNORMAL MMHG (ref 80–100)
POSITIVE BASE EXCESS, ART: 16.7 MMOL/L (ref 0–2)
POTASSIUM SERPL-SCNC: 4.5 MMOL/L (ref 3.7–5.3)
PSV: ABNORMAL
PT. POSITION: ABNORMAL
RESPIRATORY RATE: ABNORMAL
SAMPLE SITE: ABNORMAL
SET RATE: ABNORMAL
SODIUM BLD-SCNC: 141 MMOL/L (ref 135–144)
TEXT FOR RESPIRATORY: ABNORMAL
TOTAL HB: ABNORMAL G/DL (ref 12–16)
TOTAL RATE: ABNORMAL
VT: ABNORMAL

## 2021-05-05 PROCEDURE — 82805 BLOOD GASES W/O2 SATURATION: CPT

## 2021-05-05 PROCEDURE — 80048 BASIC METABOLIC PNL TOTAL CA: CPT

## 2021-05-05 PROCEDURE — 36415 COLL VENOUS BLD VENIPUNCTURE: CPT

## 2021-05-05 PROCEDURE — 36600 WITHDRAWAL OF ARTERIAL BLOOD: CPT

## 2021-05-25 ENCOUNTER — HOSPITAL ENCOUNTER (OUTPATIENT)
Dept: CARDIAC REHAB | Age: 71
Discharge: HOME | End: 2021-05-25

## 2021-06-09 ENCOUNTER — HOSPITAL ENCOUNTER (INPATIENT)
Age: 71
LOS: 9 days | Discharge: HOSPICE | DRG: 189 | End: 2021-06-18
Attending: EMERGENCY MEDICINE | Admitting: INTERNAL MEDICINE
Payer: MEDICARE

## 2021-06-09 DIAGNOSIS — R40.4 TRANSIENT ALTERATION OF AWARENESS: ICD-10-CM

## 2021-06-09 DIAGNOSIS — R06.89 HYPERCAPNIA: ICD-10-CM

## 2021-06-09 DIAGNOSIS — E87.29 RESPIRATORY ACIDOSIS: Primary | ICD-10-CM

## 2021-06-09 PROBLEM — J44.1 COPD EXACERBATION (HCC): Status: ACTIVE | Noted: 2021-06-09

## 2021-06-09 LAB
ABSOLUTE EOS #: 0.11 K/UL (ref 0–0.44)
ABSOLUTE IMMATURE GRANULOCYTE: <0.03 K/UL (ref 0–0.3)
ABSOLUTE LYMPH #: 0.93 K/UL (ref 1.1–3.7)
ABSOLUTE MONO #: 0.74 K/UL (ref 0.1–1.2)
ALBUMIN SERPL-MCNC: 3.4 G/DL (ref 3.5–5.2)
ALBUMIN/GLOBULIN RATIO: 0.9 (ref 1–2.5)
ALLEN TEST: ABNORMAL
ALP BLD-CCNC: 79 U/L (ref 35–104)
ALT SERPL-CCNC: 19 U/L (ref 5–33)
ANION GAP SERPL CALCULATED.3IONS-SCNC: ABNORMAL MMOL/L (ref 9–17)
AST SERPL-CCNC: 25 U/L
BASOPHILS # BLD: 1 % (ref 0–2)
BASOPHILS ABSOLUTE: 0.03 K/UL (ref 0–0.2)
BILIRUB SERPL-MCNC: 0.55 MG/DL (ref 0.3–1.2)
BUN BLDV-MCNC: 17 MG/DL (ref 8–23)
BUN/CREAT BLD: 30 (ref 9–20)
CALCIUM SERPL-MCNC: 9.1 MG/DL (ref 8.6–10.4)
CARBOXYHEMOGLOBIN: ABNORMAL % (ref 0–5)
CHLORIDE BLD-SCNC: 80 MMOL/L (ref 98–107)
CO2: >45 MMOL/L (ref 20–31)
CREAT SERPL-MCNC: 0.57 MG/DL (ref 0.5–0.9)
DIFFERENTIAL TYPE: ABNORMAL
EOSINOPHILS RELATIVE PERCENT: 2 % (ref 1–4)
FIO2: ABNORMAL
GFR AFRICAN AMERICAN: >60 ML/MIN
GFR NON-AFRICAN AMERICAN: >60 ML/MIN
GFR SERPL CREATININE-BSD FRML MDRD: ABNORMAL ML/MIN/{1.73_M2}
GFR SERPL CREATININE-BSD FRML MDRD: ABNORMAL ML/MIN/{1.73_M2}
GLUCOSE BLD-MCNC: 94 MG/DL (ref 70–99)
HCO3 VENOUS: 61.9 MMOL/L (ref 24–30)
HCT VFR BLD CALC: 41.2 % (ref 36.3–47.1)
HEMOGLOBIN: 13.1 G/DL (ref 11.9–15.1)
IMMATURE GRANULOCYTES: 0 %
LACTIC ACID: 0.9 MMOL/L (ref 0.5–2.2)
LYMPHOCYTES # BLD: 18 % (ref 24–43)
MCH RBC QN AUTO: 32 PG (ref 25.2–33.5)
MCHC RBC AUTO-ENTMCNC: 31.8 G/DL (ref 28.4–34.8)
MCV RBC AUTO: 100.5 FL (ref 82.6–102.9)
METHEMOGLOBIN: ABNORMAL % (ref 0–1.9)
MODE: ABNORMAL
MONOCYTES # BLD: 15 % (ref 3–12)
NEGATIVE BASE EXCESS, VEN: ABNORMAL MMOL/L (ref 0–2)
NOTIFICATION TIME: ABNORMAL
NOTIFICATION: ABNORMAL
NRBC AUTOMATED: 0 PER 100 WBC
O2 DEVICE/FLOW/%: ABNORMAL
O2 SAT, VEN: 45.3 % (ref 60–85)
OXYHEMOGLOBIN: ABNORMAL % (ref 95–98)
PATIENT TEMP: 37
PCO2, VEN, TEMP ADJ: ABNORMAL MMHG (ref 39–55)
PCO2, VEN: 125.2 (ref 39–55)
PDW BLD-RTO: 11.9 % (ref 11.8–14.4)
PEEP/CPAP: ABNORMAL
PH VENOUS: 7.31 (ref 7.32–7.42)
PH, VEN, TEMP ADJ: ABNORMAL (ref 7.32–7.42)
PLATELET # BLD: 137 K/UL (ref 138–453)
PLATELET ESTIMATE: ABNORMAL
PMV BLD AUTO: 9.9 FL (ref 8.1–13.5)
PO2, VEN, TEMP ADJ: ABNORMAL MMHG (ref 30–50)
PO2, VEN: 29.9 (ref 30–50)
POSITIVE BASE EXCESS, VEN: 27.3 MMOL/L (ref 0–2)
POTASSIUM SERPL-SCNC: 4.2 MMOL/L (ref 3.7–5.3)
PSV: ABNORMAL
PT. POSITION: ABNORMAL
RBC # BLD: 4.1 M/UL (ref 3.95–5.11)
RBC # BLD: ABNORMAL 10*6/UL
RESPIRATORY RATE: ABNORMAL
SAMPLE SITE: ABNORMAL
SEG NEUTROPHILS: 64 % (ref 36–65)
SEGMENTED NEUTROPHILS ABSOLUTE COUNT: 3.29 K/UL (ref 1.5–8.1)
SET RATE: ABNORMAL
SODIUM BLD-SCNC: 133 MMOL/L (ref 135–144)
TEXT FOR RESPIRATORY: ABNORMAL
TOTAL HB: ABNORMAL G/DL (ref 12–16)
TOTAL PROTEIN: 7.1 G/DL (ref 6.4–8.3)
TOTAL RATE: ABNORMAL
TROPONIN INTERP: ABNORMAL
TROPONIN INTERP: ABNORMAL
TROPONIN T: ABNORMAL NG/ML
TROPONIN T: ABNORMAL NG/ML
TROPONIN, HIGH SENSITIVITY: 36 NG/L (ref 0–14)
TROPONIN, HIGH SENSITIVITY: 36 NG/L (ref 0–14)
VT: ABNORMAL
WBC # BLD: 5.1 K/UL (ref 3.5–11.3)
WBC # BLD: ABNORMAL 10*3/UL

## 2021-06-09 PROCEDURE — 94640 AIRWAY INHALATION TREATMENT: CPT

## 2021-06-09 PROCEDURE — 99285 EMERGENCY DEPT VISIT HI MDM: CPT

## 2021-06-09 PROCEDURE — 85025 COMPLETE CBC W/AUTO DIFF WBC: CPT

## 2021-06-09 PROCEDURE — 2580000003 HC RX 258: Performed by: NURSE PRACTITIONER

## 2021-06-09 PROCEDURE — 83605 ASSAY OF LACTIC ACID: CPT

## 2021-06-09 PROCEDURE — 84484 ASSAY OF TROPONIN QUANT: CPT

## 2021-06-09 PROCEDURE — 87150 DNA/RNA AMPLIFIED PROBE: CPT

## 2021-06-09 PROCEDURE — 87205 SMEAR GRAM STAIN: CPT

## 2021-06-09 PROCEDURE — 2700000000 HC OXYGEN THERAPY PER DAY

## 2021-06-09 PROCEDURE — 80053 COMPREHEN METABOLIC PANEL: CPT

## 2021-06-09 PROCEDURE — 94761 N-INVAS EAR/PLS OXIMETRY MLT: CPT

## 2021-06-09 PROCEDURE — 36415 COLL VENOUS BLD VENIPUNCTURE: CPT

## 2021-06-09 PROCEDURE — 6360000002 HC RX W HCPCS: Performed by: NURSE PRACTITIONER

## 2021-06-09 PROCEDURE — 82805 BLOOD GASES W/O2 SATURATION: CPT

## 2021-06-09 PROCEDURE — 6370000000 HC RX 637 (ALT 250 FOR IP): Performed by: EMERGENCY MEDICINE

## 2021-06-09 PROCEDURE — 87040 BLOOD CULTURE FOR BACTERIA: CPT

## 2021-06-09 PROCEDURE — 6370000000 HC RX 637 (ALT 250 FOR IP): Performed by: INTERNAL MEDICINE

## 2021-06-09 PROCEDURE — 94660 CPAP INITIATION&MGMT: CPT

## 2021-06-09 PROCEDURE — 93005 ELECTROCARDIOGRAM TRACING: CPT | Performed by: EMERGENCY MEDICINE

## 2021-06-09 PROCEDURE — 94664 DEMO&/EVAL PT USE INHALER: CPT

## 2021-06-09 PROCEDURE — 2000000000 HC ICU R&B

## 2021-06-09 RX ORDER — IPRATROPIUM BROMIDE AND ALBUTEROL SULFATE 2.5; .5 MG/3ML; MG/3ML
1 SOLUTION RESPIRATORY (INHALATION)
Status: DISCONTINUED | OUTPATIENT
Start: 2021-06-09 | End: 2021-06-09

## 2021-06-09 RX ORDER — SODIUM CHLORIDE 0.9 % (FLUSH) 0.9 %
5-40 SYRINGE (ML) INJECTION EVERY 12 HOURS SCHEDULED
Status: DISCONTINUED | OUTPATIENT
Start: 2021-06-09 | End: 2021-06-18 | Stop reason: HOSPADM

## 2021-06-09 RX ORDER — PANTOPRAZOLE SODIUM 40 MG/1
40 TABLET, DELAYED RELEASE ORAL
Status: DISCONTINUED | OUTPATIENT
Start: 2021-06-10 | End: 2021-06-18 | Stop reason: HOSPADM

## 2021-06-09 RX ORDER — SODIUM CHLORIDE 9 MG/ML
25 INJECTION, SOLUTION INTRAVENOUS PRN
Status: DISCONTINUED | OUTPATIENT
Start: 2021-06-09 | End: 2021-06-18 | Stop reason: HOSPADM

## 2021-06-09 RX ORDER — SODIUM CHLORIDE 9 MG/ML
INJECTION, SOLUTION INTRAVENOUS CONTINUOUS
Status: DISCONTINUED | OUTPATIENT
Start: 2021-06-09 | End: 2021-06-13

## 2021-06-09 RX ORDER — FUROSEMIDE 20 MG/1
20 TABLET ORAL DAILY
Status: DISCONTINUED | OUTPATIENT
Start: 2021-06-09 | End: 2021-06-18 | Stop reason: HOSPADM

## 2021-06-09 RX ORDER — ALBUTEROL SULFATE 2.5 MG/3ML
2.5 SOLUTION RESPIRATORY (INHALATION) EVERY 4 HOURS PRN
Status: DISCONTINUED | OUTPATIENT
Start: 2021-06-09 | End: 2021-06-18 | Stop reason: HOSPADM

## 2021-06-09 RX ORDER — SPIRONOLACTONE 25 MG/1
25 TABLET ORAL DAILY
Status: DISCONTINUED | OUTPATIENT
Start: 2021-06-10 | End: 2021-06-18 | Stop reason: HOSPADM

## 2021-06-09 RX ORDER — POLYETHYLENE GLYCOL 3350 17 G/17G
17 POWDER, FOR SOLUTION ORAL DAILY PRN
Status: DISCONTINUED | OUTPATIENT
Start: 2021-06-09 | End: 2021-06-18 | Stop reason: HOSPADM

## 2021-06-09 RX ORDER — PREDNISONE 20 MG/1
40 TABLET ORAL DAILY
Status: DISCONTINUED | OUTPATIENT
Start: 2021-06-12 | End: 2021-06-18 | Stop reason: HOSPADM

## 2021-06-09 RX ORDER — SODIUM CHLORIDE 0.9 % (FLUSH) 0.9 %
5-40 SYRINGE (ML) INJECTION PRN
Status: DISCONTINUED | OUTPATIENT
Start: 2021-06-09 | End: 2021-06-18 | Stop reason: HOSPADM

## 2021-06-09 RX ORDER — ONDANSETRON 2 MG/ML
4 INJECTION INTRAMUSCULAR; INTRAVENOUS EVERY 6 HOURS PRN
Status: DISCONTINUED | OUTPATIENT
Start: 2021-06-09 | End: 2021-06-18 | Stop reason: HOSPADM

## 2021-06-09 RX ORDER — HYDROXYCHLOROQUINE SULFATE 200 MG/1
200 TABLET, FILM COATED ORAL DAILY
Status: DISCONTINUED | OUTPATIENT
Start: 2021-06-10 | End: 2021-06-18 | Stop reason: HOSPADM

## 2021-06-09 RX ORDER — IPRATROPIUM BROMIDE AND ALBUTEROL SULFATE 2.5; .5 MG/3ML; MG/3ML
1 SOLUTION RESPIRATORY (INHALATION) 4 TIMES DAILY
Status: DISCONTINUED | OUTPATIENT
Start: 2021-06-09 | End: 2021-06-18 | Stop reason: HOSPADM

## 2021-06-09 RX ORDER — METOLAZONE 2.5 MG/1
2.5 TABLET ORAL
Status: DISCONTINUED | OUTPATIENT
Start: 2021-06-11 | End: 2021-06-18 | Stop reason: HOSPADM

## 2021-06-09 RX ORDER — POTASSIUM CHLORIDE 20 MEQ/1
20 TABLET, EXTENDED RELEASE ORAL DAILY
Status: DISCONTINUED | OUTPATIENT
Start: 2021-06-09 | End: 2021-06-18 | Stop reason: HOSPADM

## 2021-06-09 RX ORDER — ACETAMINOPHEN 325 MG/1
650 TABLET ORAL EVERY 6 HOURS PRN
Status: DISCONTINUED | OUTPATIENT
Start: 2021-06-09 | End: 2021-06-18 | Stop reason: HOSPADM

## 2021-06-09 RX ORDER — ACETAMINOPHEN 650 MG/1
650 SUPPOSITORY RECTAL EVERY 6 HOURS PRN
Status: DISCONTINUED | OUTPATIENT
Start: 2021-06-09 | End: 2021-06-18 | Stop reason: HOSPADM

## 2021-06-09 RX ORDER — ACETAMINOPHEN 500 MG
1000 TABLET ORAL ONCE
Status: COMPLETED | OUTPATIENT
Start: 2021-06-09 | End: 2021-06-09

## 2021-06-09 RX ORDER — METHYLPREDNISOLONE SODIUM SUCCINATE 40 MG/ML
40 INJECTION, POWDER, LYOPHILIZED, FOR SOLUTION INTRAMUSCULAR; INTRAVENOUS EVERY 6 HOURS
Status: COMPLETED | OUTPATIENT
Start: 2021-06-09 | End: 2021-06-11

## 2021-06-09 RX ADMIN — METHYLPREDNISOLONE SODIUM SUCCINATE 40 MG: 40 INJECTION, POWDER, FOR SOLUTION INTRAMUSCULAR; INTRAVENOUS at 17:45

## 2021-06-09 RX ADMIN — IPRATROPIUM BROMIDE AND ALBUTEROL SULFATE 1 AMPULE: .5; 3 SOLUTION RESPIRATORY (INHALATION) at 20:53

## 2021-06-09 RX ADMIN — ACETAMINOPHEN 1000 MG: 500 TABLET, FILM COATED ORAL at 14:02

## 2021-06-09 RX ADMIN — METHYLPREDNISOLONE SODIUM SUCCINATE 40 MG: 40 INJECTION, POWDER, FOR SOLUTION INTRAMUSCULAR; INTRAVENOUS at 22:55

## 2021-06-09 RX ADMIN — ENOXAPARIN SODIUM 40 MG: 40 INJECTION SUBCUTANEOUS at 17:45

## 2021-06-09 RX ADMIN — SODIUM CHLORIDE: 9 INJECTION, SOLUTION INTRAVENOUS at 17:46

## 2021-06-09 ASSESSMENT — ENCOUNTER SYMPTOMS
TROUBLE SWALLOWING: 0
SHORTNESS OF BREATH: 0
CONSTIPATION: 0
VOMITING: 0
BACK PAIN: 0
CHEST TIGHTNESS: 0
SORE THROAT: 0
DIARRHEA: 0
WHEEZING: 0
NAUSEA: 0
ABDOMINAL DISTENTION: 0
ABDOMINAL PAIN: 0
COUGH: 0

## 2021-06-09 ASSESSMENT — PAIN SCALES - GENERAL
PAINLEVEL_OUTOF10: 0
PAINLEVEL_OUTOF10: 8
PAINLEVEL_OUTOF10: 0

## 2021-06-09 ASSESSMENT — PAIN DESCRIPTION - DESCRIPTORS: DESCRIPTORS: ACHING

## 2021-06-09 ASSESSMENT — PAIN DESCRIPTION - ORIENTATION: ORIENTATION: LEFT

## 2021-06-09 ASSESSMENT — PAIN DESCRIPTION - LOCATION: LOCATION: WRIST

## 2021-06-09 ASSESSMENT — PAIN DESCRIPTION - PAIN TYPE: TYPE: ACUTE PAIN

## 2021-06-09 ASSESSMENT — PAIN DESCRIPTION - FREQUENCY: FREQUENCY: CONTINUOUS
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
## 2021-06-09 NOTE — PROGRESS NOTES
Writer at bedside to assess vitals and shift assessment. Lactic and first blood cultures drawn at this time. Patient denies pain or needs. Call light and bedside table within reach. Bed alarm active for safety. Will continue to monitor.
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
## 2021-06-10 ENCOUNTER — APPOINTMENT (OUTPATIENT)
Dept: CT IMAGING | Age: 71
DRG: 189 | End: 2021-06-10
Payer: MEDICARE

## 2021-06-10 ENCOUNTER — APPOINTMENT (OUTPATIENT)
Dept: GENERAL RADIOLOGY | Age: 71
DRG: 189 | End: 2021-06-10
Payer: MEDICARE

## 2021-06-10 LAB
ABSOLUTE EOS #: 0.1 K/UL (ref 0–0.44)
ABSOLUTE IMMATURE GRANULOCYTE: 0 K/UL (ref 0–0.3)
ABSOLUTE LYMPH #: 0.31 K/UL (ref 1.1–3.7)
ABSOLUTE MONO #: 0.03 K/UL (ref 0.1–1.2)
ALLEN TEST: ABNORMAL
ANION GAP SERPL CALCULATED.3IONS-SCNC: 14 MMOL/L (ref 9–17)
BASOPHILS # BLD: 0 % (ref 0–2)
BASOPHILS ABSOLUTE: 0 K/UL (ref 0–0.2)
BUN BLDV-MCNC: 17 MG/DL (ref 8–23)
BUN/CREAT BLD: 26 (ref 9–20)
CALCIUM SERPL-MCNC: 8.9 MG/DL (ref 8.6–10.4)
CARBOXYHEMOGLOBIN: ABNORMAL % (ref 0–5)
CHLORIDE BLD-SCNC: 79 MMOL/L (ref 98–107)
CO2: 40 MMOL/L (ref 20–31)
CREAT SERPL-MCNC: 0.65 MG/DL (ref 0.5–0.9)
D-DIMER QUANTITATIVE: 1.37 MG/L FEU (ref 0–0.59)
DIFFERENTIAL TYPE: ABNORMAL
EKG ATRIAL RATE: 86 BPM
EKG ATRIAL RATE: 87 BPM
EKG P AXIS: 52 DEGREES
EKG P AXIS: 54 DEGREES
EKG P-R INTERVAL: 140 MS
EKG P-R INTERVAL: 140 MS
EKG Q-T INTERVAL: 356 MS
EKG Q-T INTERVAL: 382 MS
EKG QRS DURATION: 84 MS
EKG QRS DURATION: 88 MS
EKG QTC CALCULATION (BAZETT): 428 MS
EKG QTC CALCULATION (BAZETT): 457 MS
EKG R AXIS: 100 DEGREES
EKG R AXIS: 104 DEGREES
EKG T AXIS: -15 DEGREES
EKG T AXIS: -19 DEGREES
EKG VENTRICULAR RATE: 86 BPM
EKG VENTRICULAR RATE: 87 BPM
EOSINOPHILS RELATIVE PERCENT: 3 % (ref 1–4)
FIO2: 35
FIO2: ABNORMAL
FIO2: ABNORMAL
GFR AFRICAN AMERICAN: >60 ML/MIN
GFR NON-AFRICAN AMERICAN: >60 ML/MIN
GFR SERPL CREATININE-BSD FRML MDRD: ABNORMAL ML/MIN/{1.73_M2}
GFR SERPL CREATININE-BSD FRML MDRD: ABNORMAL ML/MIN/{1.73_M2}
GLUCOSE BLD-MCNC: 232 MG/DL (ref 70–99)
GLUCOSE BLD-MCNC: 242 MG/DL (ref 74–100)
HCO3 ARTERIAL: 44.6 MMOL/L (ref 22–26)
HCO3 ARTERIAL: 53.5 MMOL/L (ref 22–26)
HCO3 ARTERIAL: 54.7 MMOL/L (ref 22–26)
HCT VFR BLD CALC: 43 % (ref 36.3–47.1)
HEMOGLOBIN: 13.8 G/DL (ref 11.9–15.1)
IMMATURE GRANULOCYTES: 0 %
LYMPHOCYTES # BLD: 9 % (ref 24–43)
MCH RBC QN AUTO: 31.9 PG (ref 25.2–33.5)
MCHC RBC AUTO-ENTMCNC: 32.1 G/DL (ref 28.4–34.8)
MCV RBC AUTO: 99.3 FL (ref 82.6–102.9)
METHEMOGLOBIN: ABNORMAL % (ref 0–1.9)
MODE: ABNORMAL
MONOCYTES # BLD: 1 % (ref 3–12)
MORPHOLOGY: ABNORMAL
MORPHOLOGY: ABNORMAL
NEGATIVE BASE EXCESS, ART: ABNORMAL MMOL/L (ref 0–2)
NOTIFICATION TIME: ABNORMAL
NOTIFICATION: ABNORMAL
NRBC AUTOMATED: 0 PER 100 WBC
O2 DEVICE/FLOW/%: ABNORMAL
O2 SAT, ARTERIAL: 95.2 % (ref 95–98)
O2 SAT, ARTERIAL: 99.2 % (ref 95–98)
O2 SAT, ARTERIAL: 99.7 % (ref 95–98)
OXYHEMOGLOBIN: ABNORMAL % (ref 95–98)
PATIENT TEMP: 37
PCO2 ARTERIAL: 129.6 MMHG (ref 35–45)
PCO2 ARTERIAL: 95.4 MMHG (ref 35–45)
PCO2 ARTERIAL: 97.4 MMHG (ref 35–45)
PCO2, ART, TEMP ADJ: ABNORMAL
PCO2, ART, TEMP ADJ: ABNORMAL
PCO2, ART, TEMP ADJ: ABNORMAL (ref 35–45)
PDW BLD-RTO: 11.7 % (ref 11.8–14.4)
PEEP/CPAP: ABNORMAL
PH ARTERIAL: 7.16 (ref 7.35–7.45)
PH ARTERIAL: 7.37 (ref 7.35–7.45)
PH ARTERIAL: 7.37 (ref 7.35–7.45)
PH, ART, TEMP ADJ: ABNORMAL (ref 7.35–7.45)
PLATELET # BLD: ABNORMAL K/UL (ref 138–453)
PLATELET ESTIMATE: ABNORMAL
PLATELET, FLUORESCENCE: 140 K/UL (ref 138–453)
PLATELET, IMMATURE FRACTION: 6 % (ref 1.1–10.3)
PMV BLD AUTO: ABNORMAL FL (ref 8.1–13.5)
PO2 ARTERIAL: 194.6 MMHG (ref 80–100)
PO2 ARTERIAL: 445.7 MMHG (ref 80–100)
PO2 ARTERIAL: 84.3 MMHG (ref 80–100)
PO2, ART, TEMP ADJ: ABNORMAL MMHG (ref 80–100)
POSITIVE BASE EXCESS, ART: 10 MMOL/L (ref 0–2)
POSITIVE BASE EXCESS, ART: 21.9 MMOL/L (ref 0–2)
POSITIVE BASE EXCESS, ART: 22.8 MMOL/L (ref 0–2)
POTASSIUM SERPL-SCNC: 3.8 MMOL/L (ref 3.7–5.3)
PSV: ABNORMAL
PT. POSITION: ABNORMAL
RBC # BLD: 4.33 M/UL (ref 3.95–5.11)
RBC # BLD: ABNORMAL 10*6/UL
RESPIRATORY RATE: 23
RESPIRATORY RATE: ABNORMAL
RESPIRATORY RATE: ABNORMAL
SAMPLE SITE: ABNORMAL
SEG NEUTROPHILS: 87 % (ref 36–65)
SEGMENTED NEUTROPHILS ABSOLUTE COUNT: 2.96 K/UL (ref 1.5–8.1)
SET RATE: ABNORMAL
SODIUM BLD-SCNC: 133 MMOL/L (ref 135–144)
TEXT FOR RESPIRATORY: ABNORMAL
TOTAL HB: ABNORMAL G/DL (ref 12–16)
TOTAL RATE: ABNORMAL
TROPONIN INTERP: ABNORMAL
TROPONIN INTERP: ABNORMAL
TROPONIN T: ABNORMAL NG/ML
TROPONIN T: ABNORMAL NG/ML
TROPONIN, HIGH SENSITIVITY: 31 NG/L (ref 0–14)
TROPONIN, HIGH SENSITIVITY: 35 NG/L (ref 0–14)
VT: ABNORMAL
WBC # BLD: 3.4 K/UL (ref 3.5–11.3)
WBC # BLD: ABNORMAL 10*3/UL

## 2021-06-10 PROCEDURE — 6370000000 HC RX 637 (ALT 250 FOR IP): Performed by: INTERNAL MEDICINE

## 2021-06-10 PROCEDURE — 82805 BLOOD GASES W/O2 SATURATION: CPT

## 2021-06-10 PROCEDURE — 94761 N-INVAS EAR/PLS OXIMETRY MLT: CPT

## 2021-06-10 PROCEDURE — 85025 COMPLETE CBC W/AUTO DIFF WBC: CPT

## 2021-06-10 PROCEDURE — 2500000003 HC RX 250 WO HCPCS: Performed by: NURSE PRACTITIONER

## 2021-06-10 PROCEDURE — 85379 FIBRIN DEGRADATION QUANT: CPT

## 2021-06-10 PROCEDURE — 6360000002 HC RX W HCPCS: Performed by: NURSE PRACTITIONER

## 2021-06-10 PROCEDURE — 84484 ASSAY OF TROPONIN QUANT: CPT

## 2021-06-10 PROCEDURE — 2580000003 HC RX 258: Performed by: NURSE PRACTITIONER

## 2021-06-10 PROCEDURE — 99291 CRITICAL CARE FIRST HOUR: CPT | Performed by: INTERNAL MEDICINE

## 2021-06-10 PROCEDURE — 71260 CT THORAX DX C+: CPT

## 2021-06-10 PROCEDURE — 2000000000 HC ICU R&B

## 2021-06-10 PROCEDURE — 36415 COLL VENOUS BLD VENIPUNCTURE: CPT

## 2021-06-10 PROCEDURE — 82947 ASSAY GLUCOSE BLOOD QUANT: CPT

## 2021-06-10 PROCEDURE — 85055 RETICULATED PLATELET ASSAY: CPT

## 2021-06-10 PROCEDURE — 80048 BASIC METABOLIC PNL TOTAL CA: CPT

## 2021-06-10 PROCEDURE — 97530 THERAPEUTIC ACTIVITIES: CPT

## 2021-06-10 PROCEDURE — 2700000000 HC OXYGEN THERAPY PER DAY

## 2021-06-10 PROCEDURE — 94660 CPAP INITIATION&MGMT: CPT

## 2021-06-10 PROCEDURE — 94664 DEMO&/EVAL PT USE INHALER: CPT

## 2021-06-10 PROCEDURE — 6360000002 HC RX W HCPCS: Performed by: INTERNAL MEDICINE

## 2021-06-10 PROCEDURE — 94640 AIRWAY INHALATION TREATMENT: CPT

## 2021-06-10 PROCEDURE — 2580000003 HC RX 258: Performed by: INTERNAL MEDICINE

## 2021-06-10 PROCEDURE — 97162 PT EVAL MOD COMPLEX 30 MIN: CPT

## 2021-06-10 PROCEDURE — 93010 ELECTROCARDIOGRAM REPORT: CPT | Performed by: INTERNAL MEDICINE

## 2021-06-10 PROCEDURE — 6370000000 HC RX 637 (ALT 250 FOR IP): Performed by: NURSE PRACTITIONER

## 2021-06-10 PROCEDURE — 6360000004 HC RX CONTRAST MEDICATION: Performed by: NURSE PRACTITIONER

## 2021-06-10 PROCEDURE — 93005 ELECTROCARDIOGRAM TRACING: CPT | Performed by: NURSE PRACTITIONER

## 2021-06-10 PROCEDURE — 36600 WITHDRAWAL OF ARTERIAL BLOOD: CPT

## 2021-06-10 PROCEDURE — 71046 X-RAY EXAM CHEST 2 VIEWS: CPT

## 2021-06-10 RX ORDER — SODIUM CHLORIDE 9 MG/ML
INJECTION, SOLUTION INTRAVENOUS CONTINUOUS
Status: DISCONTINUED | OUTPATIENT
Start: 2021-06-10 | End: 2021-06-10

## 2021-06-10 RX ADMIN — IOPAMIDOL 75 ML: 755 INJECTION, SOLUTION INTRAVENOUS at 14:51

## 2021-06-10 RX ADMIN — ENOXAPARIN SODIUM 40 MG: 40 INJECTION SUBCUTANEOUS at 08:05

## 2021-06-10 RX ADMIN — IPRATROPIUM BROMIDE AND ALBUTEROL SULFATE 1 AMPULE: .5; 3 SOLUTION RESPIRATORY (INHALATION) at 21:13

## 2021-06-10 RX ADMIN — METHYLPREDNISOLONE SODIUM SUCCINATE 40 MG: 40 INJECTION, POWDER, FOR SOLUTION INTRAMUSCULAR; INTRAVENOUS at 11:46

## 2021-06-10 RX ADMIN — IPRATROPIUM BROMIDE AND ALBUTEROL SULFATE 1 AMPULE: .5; 3 SOLUTION RESPIRATORY (INHALATION) at 05:38

## 2021-06-10 RX ADMIN — SPIRONOLACTONE 25 MG: 25 TABLET, FILM COATED ORAL at 08:05

## 2021-06-10 RX ADMIN — FUROSEMIDE 20 MG: 20 TABLET ORAL at 08:05

## 2021-06-10 RX ADMIN — METHYLPREDNISOLONE SODIUM SUCCINATE 40 MG: 40 INJECTION, POWDER, FOR SOLUTION INTRAMUSCULAR; INTRAVENOUS at 23:22

## 2021-06-10 RX ADMIN — IPRATROPIUM BROMIDE AND ALBUTEROL SULFATE 1 AMPULE: .5; 3 SOLUTION RESPIRATORY (INHALATION) at 15:25

## 2021-06-10 RX ADMIN — SODIUM BICARBONATE: 84 INJECTION, SOLUTION INTRAVENOUS at 11:46

## 2021-06-10 RX ADMIN — IPRATROPIUM BROMIDE AND ALBUTEROL SULFATE 1 AMPULE: .5; 3 SOLUTION RESPIRATORY (INHALATION) at 11:27

## 2021-06-10 RX ADMIN — HYDROXYCHLOROQUINE SULFATE 200 MG: 200 TABLET ORAL at 08:05

## 2021-06-10 RX ADMIN — SODIUM CHLORIDE: 9 INJECTION, SOLUTION INTRAVENOUS at 07:36

## 2021-06-10 RX ADMIN — POTASSIUM CHLORIDE 20 MEQ: 1500 TABLET, EXTENDED RELEASE ORAL at 08:05

## 2021-06-10 RX ADMIN — METHYLPREDNISOLONE SODIUM SUCCINATE 40 MG: 40 INJECTION, POWDER, FOR SOLUTION INTRAMUSCULAR; INTRAVENOUS at 05:49

## 2021-06-10 RX ADMIN — PANTOPRAZOLE SODIUM 40 MG: 40 TABLET, DELAYED RELEASE ORAL at 07:33

## 2021-06-10 RX ADMIN — SODIUM CHLORIDE: 9 INJECTION, SOLUTION INTRAVENOUS at 22:36

## 2021-06-10 RX ADMIN — SODIUM CHLORIDE, PRESERVATIVE FREE 10 ML: 5 INJECTION INTRAVENOUS at 20:46

## 2021-06-10 RX ADMIN — LEVOTHYROXINE SODIUM 175 MCG: 150 TABLET ORAL at 07:33

## 2021-06-10 RX ADMIN — SERTRALINE HYDROCHLORIDE 50 MG: 50 TABLET ORAL at 08:05

## 2021-06-10 RX ADMIN — CEFEPIME HYDROCHLORIDE 2000 MG: 2 INJECTION, POWDER, FOR SOLUTION INTRAVENOUS at 19:51

## 2021-06-10 RX ADMIN — METHYLPREDNISOLONE SODIUM SUCCINATE 40 MG: 40 INJECTION, POWDER, FOR SOLUTION INTRAMUSCULAR; INTRAVENOUS at 18:44

## 2021-06-10 ASSESSMENT — PAIN SCALES - GENERAL
PAINLEVEL_OUTOF10: 0

## 2021-06-10 NOTE — PROGRESS NOTES
PRICE met with pt's spouse, Michelle Hess, to complete assessment. Pt presently sleeping on bi pap. Pt is a 73year old  female admitted for respiratory acidosis. Pt lives with her spouse in their home in [facility]. Pt's spouse reports that their home is all on one level. Pt has a cane, shower chair, grab bars, and rails on the toilet at home. Pt also has home oxygen. Pt does not have any services that she is using at home. Pt's spouse transports her to all appointments. Pt is a DNR CCA code status and pt follows with Dr Christiane Nelson as PCP. Pt has advance directives on file. Spouse reports that pt's medications are covered well by insurance. Spouse intends for pt to return home at discharge. Spouse expresses interest in skilled [facility] when pt discharges and understands what they can provide for her. SW provided listing of [facility] agencies to choose from. No other discharge needs identified. SW will follow and remain available.  Yeyo LOPEZ 6/10/2021

## 2021-06-10 NOTE — PROGRESS NOTES
Patient is awake and making conversation with family members at bedside. Patient is confused and pulls at cords intermittently but attention is able to be diverted elsewhere.

## 2021-06-10 NOTE — PROGRESS NOTES
Rapid Response called to [phone] at this time. Upon entering room, patient in wheelchair, unresponsive. Patient returned to the bed via staff members. EKG and glucose levels obtained. Patient remained unresponsive during the completion of these items. Labs drawn upon the entry of Mol NP to the room. Secondary IV access obtained as well. Patient was incontinent of urine during her unresponsive episode; samuel-care provided and brief changed. Will continue to monitor and call light is within reach.

## 2021-06-10 NOTE — PROGRESS NOTES
100.5 %   · BMI: 19.6  · Adjusted Body Weight:  ; No Adjustment   · BMI Categories: Normal Weight (BMI 18.5-24. 9)       Nutrition Diagnosis:   · Severe malnutrition related to inadequate protein-energy intake as evidenced by weight loss greater than or equal to 5% in 1 month, poor intake prior to admission, localized or generalized fluid accumulation, moderate muscle loss, mild loss of subcutaneous fat    Lab Results   Component Value Date     (L) 06/09/2021    K 4.2 06/09/2021    CL 80 (LL) 06/09/2021    CO2 >45 (HH) 06/09/2021    BUN 17 06/09/2021    CREATININE 0.57 06/09/2021    GLUCOSE 94 06/09/2021    CALCIUM 9.1 06/09/2021    PROT 7.1 06/09/2021    LABALBU 3.4 (L) 06/09/2021    BILITOT 0.55 06/09/2021    ALKPHOS 79 06/09/2021    AST 25 06/09/2021    ALT 19 06/09/2021    LABGLOM >60 06/09/2021    GFRAA >60 06/09/2021    GLOB NOT REPORTED 11/02/2017     No results found for: LABA1C  No results found for: EAG  No results found for: VITD25    Nutrition Interventions:   Food and/or Nutrient Delivery:  Continue Current Diet, Start Oral Nutrition Supplement  Nutrition Education/Counseling:  No recommendation at this time   Coordination of Nutrition Care:  Continue to monitor while inpatient    Goals:  PO >75% meals and supplements       Nutrition Monitoring and Evaluation:   Behavioral-Environmental Outcomes:   Other (Comment) (lethargy with condition)   Food/Nutrient Intake Outcomes:  Food and Nutrient Intake, Supplement Intake  Physical Signs/Symptoms Outcomes:  Biochemical Data, Weight, Fluid Status or Edema     Discharge Planning:    Continue Oral Nutrition Supplement, Continue current diet     Electronically signed by Lashae Bowman RD, LD on 6/10/21 at 7:45 AM EDT    Contact: [phone]

## 2021-06-10 NOTE — PROGRESS NOTES
[facility] SPECIALTY [facility]  OCCUPATIONAL THERAPY  No Visit Note    [x] ICU    [] Acute   Patient: Agustin Erm  Room: [facility]/J404-21      Agustin Erm not seen on 6/10/2021 at 10:42 AM due to patient rapid response. Pt just arrived back to the floor from a chest x-ray. Nurses and respiratory present with patient managing vitals. Patient is not appropriate for evaluation at this time. Will attempt at our earliest opportunity.            Signature: Waldon Kanner, MOT, OTR/L

## 2021-06-10 NOTE — PROGRESS NOTES
Rapid Response called. Met with , sister, and sister in law that were outside of the room. They were very concerned and said that a code blue was called. I met with the family and prayed together. They were very relieved when the nurses gave them an update.   called their kids to come to the hospital.
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
## 2021-06-10 NOTE — PLAN OF CARE
Problem: Falls - Risk of:  Goal: Will remain free from falls  Description: Will remain free from falls  6/10/2021 [id number] by Arlene Hutchinson RN  Outcome: Ongoing  Note: Patient is alert and oriented and has demonstrated the use of using the call light for assistance before getting up. Education has been provided to defer the use of the bed alarm and/or restraint free alarm as the patient has shown competency in calling for assistance and verbalizing the risk. Problem: Falls - Risk of:  Goal: Absence of physical injury  Description: Absence of physical injury  6/10/2021 1366 by Arlene Hutchinson RN  Outcome: Ongoing     Problem: Skin Integrity:  Goal: Will show no infection signs and symptoms  Description: Will show no infection signs and symptoms  6/10/2021 0922 by Arlene Hutchinson RN  Outcome: Ongoing  Note: No s/s of infection noted at this time. Labs being drawn daily. Patient remains afebrile.      Problem: Skin Integrity:  Goal: Absence of new skin breakdown  Description: Absence of new skin breakdown  6/10/2021 0922 by Arlene Hutchinson RN  Outcome: Ongoing     Problem: Nutrition  Goal: Optimal nutrition therapy  6/10/2021 0922 by Arlene Hutchinson RN  Outcome: Ongoing
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
## 2021-06-10 NOTE — PLAN OF CARE
Problem: Falls - Risk of:  Goal: Will remain free from falls  Description: Will remain free from falls  Outcome: Ongoing  Note: Call light and bedside table with in reach. Bed and chair wheels locked at all times, with bed in lowest position. Frequent checks and needs meet in appropriate timing. Goal: Absence of physical injury  Description: Absence of physical injury  Outcome: Ongoing     Problem: Skin Integrity:  Goal: Will show no infection signs and symptoms  Description: Will show no infection signs and symptoms  Outcome: Ongoing  Note: Nestor scale monitoring. inspect skin for breakdown. Chart all shin breakdown. Encourage frequent repositioning. No new breakdown.    Goal: Absence of new skin breakdown  Description: Absence of new skin breakdown  Outcome: Ongoing
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
## 2021-06-11 LAB
ABSOLUTE EOS #: 0 K/UL (ref 0–0.44)
ABSOLUTE IMMATURE GRANULOCYTE: 0 K/UL (ref 0–0.3)
ABSOLUTE LYMPH #: 0.28 K/UL (ref 1.1–3.7)
ABSOLUTE MONO #: 0.21 K/UL (ref 0.1–1.2)
ALLEN TEST: ABNORMAL
ANION GAP SERPL CALCULATED.3IONS-SCNC: ABNORMAL MMOL/L (ref 9–17)
BASOPHILS # BLD: 0 % (ref 0–2)
BASOPHILS ABSOLUTE: 0 K/UL (ref 0–0.2)
BUN BLDV-MCNC: 16 MG/DL (ref 8–23)
BUN/CREAT BLD: 29 (ref 9–20)
CALCIUM SERPL-MCNC: 9.1 MG/DL (ref 8.6–10.4)
CARBOXYHEMOGLOBIN: ABNORMAL % (ref 0–5)
CHLORIDE BLD-SCNC: 83 MMOL/L (ref 98–107)
CO2: >45 MMOL/L (ref 20–31)
CREAT SERPL-MCNC: 0.55 MG/DL (ref 0.5–0.9)
DIFFERENTIAL TYPE: ABNORMAL
EKG ATRIAL RATE: 102 BPM
EKG P AXIS: 53 DEGREES
EKG P-R INTERVAL: 142 MS
EKG Q-T INTERVAL: 338 MS
EKG QRS DURATION: 96 MS
EKG QTC CALCULATION (BAZETT): 440 MS
EKG R AXIS: 98 DEGREES
EKG T AXIS: 0 DEGREES
EKG VENTRICULAR RATE: 102 BPM
EOSINOPHILS RELATIVE PERCENT: 0 % (ref 1–4)
FIO2: 32
GFR AFRICAN AMERICAN: >60 ML/MIN
GFR NON-AFRICAN AMERICAN: >60 ML/MIN
GFR SERPL CREATININE-BSD FRML MDRD: ABNORMAL ML/MIN/{1.73_M2}
GFR SERPL CREATININE-BSD FRML MDRD: ABNORMAL ML/MIN/{1.73_M2}
GLUCOSE BLD-MCNC: 126 MG/DL (ref 70–99)
HCO3 ARTERIAL: 54 MMOL/L (ref 22–26)
HCT VFR BLD CALC: 39.8 % (ref 36.3–47.1)
HEMOGLOBIN: 12.7 G/DL (ref 11.9–15.1)
IMMATURE GRANULOCYTES: 0 %
LYMPHOCYTES # BLD: 4 % (ref 24–43)
MCH RBC QN AUTO: 31.8 PG (ref 25.2–33.5)
MCHC RBC AUTO-ENTMCNC: 31.9 G/DL (ref 28.4–34.8)
MCV RBC AUTO: 99.5 FL (ref 82.6–102.9)
METHEMOGLOBIN: ABNORMAL % (ref 0–1.9)
MODE: ABNORMAL
MONOCYTES # BLD: 3 % (ref 3–12)
MORPHOLOGY: NORMAL
NEGATIVE BASE EXCESS, ART: ABNORMAL MMOL/L (ref 0–2)
NOTIFICATION TIME: ABNORMAL
NOTIFICATION: ABNORMAL
NRBC AUTOMATED: 0 PER 100 WBC
O2 DEVICE/FLOW/%: ABNORMAL
O2 SAT, ARTERIAL: 92.4 % (ref 95–98)
OXYHEMOGLOBIN: ABNORMAL % (ref 95–98)
PATIENT TEMP: 37
PCO2 ARTERIAL: 95.5 MMHG (ref 35–45)
PCO2, ART, TEMP ADJ: ABNORMAL (ref 35–45)
PDW BLD-RTO: 11.9 % (ref 11.8–14.4)
PEEP/CPAP: ABNORMAL
PH ARTERIAL: 7.37 (ref 7.35–7.45)
PH, ART, TEMP ADJ: ABNORMAL (ref 7.35–7.45)
PLATELET # BLD: ABNORMAL K/UL (ref 138–453)
PLATELET ESTIMATE: ABNORMAL
PLATELET, FLUORESCENCE: 123 K/UL (ref 138–453)
PLATELET, IMMATURE FRACTION: 5.4 % (ref 1.1–10.3)
PMV BLD AUTO: ABNORMAL FL (ref 8.1–13.5)
PO2 ARTERIAL: 70.3 MMHG (ref 80–100)
PO2, ART, TEMP ADJ: ABNORMAL MMHG (ref 80–100)
POSITIVE BASE EXCESS, ART: 22.4 MMOL/L (ref 0–2)
POTASSIUM SERPL-SCNC: 4.3 MMOL/L (ref 3.7–5.3)
PSV: ABNORMAL
PT. POSITION: ABNORMAL
RBC # BLD: 4 M/UL (ref 3.95–5.11)
RBC # BLD: ABNORMAL 10*6/UL
RESPIRATORY RATE: 20
SAMPLE SITE: ABNORMAL
SEG NEUTROPHILS: 93 % (ref 36–65)
SEGMENTED NEUTROPHILS ABSOLUTE COUNT: 6.61 K/UL (ref 1.5–8.1)
SET RATE: ABNORMAL
SODIUM BLD-SCNC: 132 MMOL/L (ref 135–144)
TEXT FOR RESPIRATORY: ABNORMAL
TOTAL HB: ABNORMAL G/DL (ref 12–16)
TOTAL RATE: ABNORMAL
VT: ABNORMAL
WBC # BLD: 7.1 K/UL (ref 3.5–11.3)
WBC # BLD: ABNORMAL 10*3/UL

## 2021-06-11 PROCEDURE — 6370000000 HC RX 637 (ALT 250 FOR IP): Performed by: NURSE PRACTITIONER

## 2021-06-11 PROCEDURE — 36600 WITHDRAWAL OF ARTERIAL BLOOD: CPT

## 2021-06-11 PROCEDURE — 6360000002 HC RX W HCPCS: Performed by: NURSE PRACTITIONER

## 2021-06-11 PROCEDURE — 97166 OT EVAL MOD COMPLEX 45 MIN: CPT

## 2021-06-11 PROCEDURE — 6370000000 HC RX 637 (ALT 250 FOR IP): Performed by: INTERNAL MEDICINE

## 2021-06-11 PROCEDURE — 97530 THERAPEUTIC ACTIVITIES: CPT

## 2021-06-11 PROCEDURE — 2580000003 HC RX 258: Performed by: NURSE PRACTITIONER

## 2021-06-11 PROCEDURE — 93010 ELECTROCARDIOGRAM REPORT: CPT | Performed by: INTERNAL MEDICINE

## 2021-06-11 PROCEDURE — 94761 N-INVAS EAR/PLS OXIMETRY MLT: CPT

## 2021-06-11 PROCEDURE — 80048 BASIC METABOLIC PNL TOTAL CA: CPT

## 2021-06-11 PROCEDURE — 97110 THERAPEUTIC EXERCISES: CPT

## 2021-06-11 PROCEDURE — 94640 AIRWAY INHALATION TREATMENT: CPT

## 2021-06-11 PROCEDURE — 2000000000 HC ICU R&B

## 2021-06-11 PROCEDURE — 2580000003 HC RX 258: Performed by: INTERNAL MEDICINE

## 2021-06-11 PROCEDURE — 36415 COLL VENOUS BLD VENIPUNCTURE: CPT

## 2021-06-11 PROCEDURE — 97116 GAIT TRAINING THERAPY: CPT

## 2021-06-11 PROCEDURE — 6360000002 HC RX W HCPCS: Performed by: INTERNAL MEDICINE

## 2021-06-11 PROCEDURE — 85025 COMPLETE CBC W/AUTO DIFF WBC: CPT

## 2021-06-11 PROCEDURE — 2700000000 HC OXYGEN THERAPY PER DAY

## 2021-06-11 PROCEDURE — 94660 CPAP INITIATION&MGMT: CPT

## 2021-06-11 PROCEDURE — 82805 BLOOD GASES W/O2 SATURATION: CPT

## 2021-06-11 PROCEDURE — 85055 RETICULATED PLATELET ASSAY: CPT

## 2021-06-11 RX ADMIN — SODIUM CHLORIDE: 9 INJECTION, SOLUTION INTRAVENOUS at 23:28

## 2021-06-11 RX ADMIN — CEFEPIME HYDROCHLORIDE 2000 MG: 2 INJECTION, POWDER, FOR SOLUTION INTRAVENOUS at 18:43

## 2021-06-11 RX ADMIN — SODIUM CHLORIDE: 9 INJECTION, SOLUTION INTRAVENOUS at 11:41

## 2021-06-11 RX ADMIN — METHYLPREDNISOLONE SODIUM SUCCINATE 40 MG: 40 INJECTION, POWDER, FOR SOLUTION INTRAMUSCULAR; INTRAVENOUS at 05:29

## 2021-06-11 RX ADMIN — IPRATROPIUM BROMIDE AND ALBUTEROL SULFATE 1 AMPULE: .5; 3 SOLUTION RESPIRATORY (INHALATION) at 15:46

## 2021-06-11 RX ADMIN — IPRATROPIUM BROMIDE AND ALBUTEROL SULFATE 1 AMPULE: .5; 3 SOLUTION RESPIRATORY (INHALATION) at 11:17

## 2021-06-11 RX ADMIN — POTASSIUM CHLORIDE 20 MEQ: 1500 TABLET, EXTENDED RELEASE ORAL at 07:56

## 2021-06-11 RX ADMIN — IPRATROPIUM BROMIDE AND ALBUTEROL SULFATE 1 AMPULE: .5; 3 SOLUTION RESPIRATORY (INHALATION) at 06:09

## 2021-06-11 RX ADMIN — LEVOTHYROXINE SODIUM 175 MCG: 150 TABLET ORAL at 07:57

## 2021-06-11 RX ADMIN — PANTOPRAZOLE SODIUM 40 MG: 40 TABLET, DELAYED RELEASE ORAL at 07:57

## 2021-06-11 RX ADMIN — FUROSEMIDE 20 MG: 20 TABLET ORAL at 07:57

## 2021-06-11 RX ADMIN — METHYLPREDNISOLONE SODIUM SUCCINATE 40 MG: 40 INJECTION, POWDER, FOR SOLUTION INTRAMUSCULAR; INTRAVENOUS at 13:08

## 2021-06-11 RX ADMIN — HYDROXYCHLOROQUINE SULFATE 200 MG: 200 TABLET ORAL at 07:57

## 2021-06-11 RX ADMIN — SERTRALINE HYDROCHLORIDE 50 MG: 50 TABLET ORAL at 07:57

## 2021-06-11 RX ADMIN — CEFEPIME HYDROCHLORIDE 2000 MG: 2 INJECTION, POWDER, FOR SOLUTION INTRAVENOUS at 07:57

## 2021-06-11 RX ADMIN — IPRATROPIUM BROMIDE AND ALBUTEROL SULFATE 1 AMPULE: .5; 3 SOLUTION RESPIRATORY (INHALATION) at 21:57

## 2021-06-11 RX ADMIN — METOLAZONE 2.5 MG: 2.5 TABLET ORAL at 07:57

## 2021-06-11 RX ADMIN — ENOXAPARIN SODIUM 40 MG: 40 INJECTION SUBCUTANEOUS at 07:57

## 2021-06-11 RX ADMIN — SPIRONOLACTONE 25 MG: 25 TABLET, FILM COATED ORAL at 07:57

## 2021-06-11 ASSESSMENT — PAIN SCALES - GENERAL
PAINLEVEL_OUTOF10: 0

## 2021-06-11 NOTE — PROGRESS NOTES
Patient calls out to go to bathroom. One assist to bedside commode. Incontinent of urine. Brief changed. No output recorded from commode. Assisted back to bed and repositioned. BiPAP placed back on patient. Call light in reach. No further needs at this time.
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
## 2021-06-11 NOTE — PROGRESS NOTES
sp02 92% on 1L oxygen NC, pt request to leave bipap mask off for a bit longer, oxygen decreased to 0.5L to keep sp02 at 88% per Arbutus Lesch NP.

## 2021-06-11 NOTE — PROGRESS NOTES
Patient awake in chair with family at bedside. Alert and oriented x2. Disoriented to situation and time. Assessment and vitals as charted. One assist to bedside commode. Patient incontinent of large amount of urine and voided 200 ml of clear, yellow urine in commode as well. BiPAP placed back on at this time. Repositioned in chair. Call light in reach.

## 2021-06-11 NOTE — PROGRESS NOTES
Romel Lee M.D. Internal Medicine ICU Note 6/10/21      History:   The patient is a 73 y.o. female seen in ICU for f/u of acute on chronic respiratory failure with hypercapnia and respiratory acidosis. She was maintained on BiPAP overnight. Yesterday rapid response was called due to agonal breathing and unresponsiveness while on nasal canula. She was placed on BiPAP and once she woke up a little bit she was sent for CTA chest which was negative for PE. She did sleep with BiPAP on last night and is much more awake and alert this morning. She is sitting up in chair and conversing with her . Denies any complaints at this time. Review of Systems:  Constitutional:negative  for fevers, and negative for chills. Respiratory: positive for shortness of breath, positive for cough, and negative for wheezing  Cardiovascular: negative for chest pain, negative for palpitations, and negative for syncope  Gastrointestinal: negative for abdominal pain, negative for nausea,negative for vomiting, negative for diarrhea, negative for constipation, and negative for hematochezia or melena  Genitourinary: negative for dysuria, negative for urinary urgency, negative for urinary frequency, and negative for hematuria  Neurological: negative for unilateral weakness, numbness or tingling. All other systems were reviewed with the patient and are negative except as stated    Objective:    Vitals:   Temp: 97.9 °F (36.6 °C)  BP: 122/66  Resp: 25  Pulse: 92  SpO2: 90 %    -----------------------------------------------------------------  Exam:  GEN:    Awake, alert and oriented x3. EYES:  EOMI, pupils equal   NECK: Supple. No lymphadenopathy. No carotid bruit  CVS:    regular rate and rhythm, no audible murmur  PULM:  diminished with scattered rhonchi, no acute respiratory distress on BiPAP  ABD:    Bowels sounds normal.  Abdomen is soft. No distention. no tenderness to palpation. EXT:   no edema bilaterally .
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
## 2021-06-11 NOTE — PROGRESS NOTES
rest. Abn.  resp. []  SOB at rest.   2   Bilateral Breath Sounds (BBS) []  Clear [x]  Diminish-ed bases  []  Diminish-ed t/o, or rales   []  Sporadic, scattered wheezes or rhonchi []  Persistentwheezes and, or absent BBS 1   Cough [x]  Strong, effective, & non-prod. []  Effective & prod. Less than 25 ml (2 TBSP) over past 24 hrs []  Ineffective & non-prod to less than 25 ML over past 24 hrs []  Ineffective and, or greater than 25 ml sputum prod. past 24 hrs. []  Nonspon- taneous; Requires suctioning 0   Pulmonary History  (PULM HX) []  No smoking and no chronic pulmonaryhistory []  Former smoker. Quit over 12 mos. ago []  Current smoker or quit w/ in 12 mos []  Pulm. History and, or 20 pk/yr smoking hx [x]  Admitted w/ acute pulm. dx and, or has been admitted w/ pulm. dx 2 or more times over past 12 mos 4   Surgical History this Admit  (SURG HX) [x]  No surgery []  General surgery []  Lower abdominal []  Thoracic or upper abdominal   []  Thoracic w/ pulm. disease 0   Chest X-Ray (CXR)/CT Scan []  Clear or not applicable []  Not available []  Atelect- asis or pleural effusions [x]  Localized infiltrate or pulm. edema []  Con-solidated Infiltrates, bilateral, or in more than 1 lobe 3   Slow or Forced VC, FEV1 OR PEFR (PULM FXN)  [x]  80% or greater, or not indicated []  Pt. unable to perform []  FEV1 or PEFR or VC 51-79%.   []  FEV1 or PEFR or VC  30-49%   []  FEV1 or PEFR or VC less than 30%   0   TOTAL ACUITY: 10       CARE PLAN    If Acuity Level is 2, 3, or 4 in any of the following:    [] BILATERAL BREATH SOUNDS (BBS)     [x] PULMONARY HISTORY (PULM HX)  [] PULMONARY FUNCTION (PULM FX)    Goal: Improve respiratory functions in patients with airway disease and decrease WOB    [x] AEROSOL PROTOCOL    Total Acuity:   16-32  []  Secondary Assessment in 24 hrs Total Acuity:  9-15  [x]  Secondary Assessment in 24 hrs Total Acuity:  4-8  []  Secondary Assessment in 48 hrs Total Acuity:  0-3  []  Secondary Assessment in 72 hrs   HHN AEROSOL THERAPY with  [physician-ordered bronchodilator(s)] q 4 & Albuterol PRN q2 hrs. Breath-Actuated Neb if BBS Acuity = 4, and pt. can use MP. Notify physician if condition deteriorates. HHN AEROSOL THERAPY with  [physician-ordered bronchodilator(s)]  QID and Albuterol PRN q4 hrs. Breath-Actuated Neb if BBS Acuity = 4, and pt. can use MP. Notify physician if condition deteriorates. MDI THERAPY with  2 actuations of [physician-ordered bronchodilator(s)] via spacer TID Albuterol and PRNq4 hrs. If unable to utilize MDI: HHN [physician-ordered bronchodilator(s)] TID and Albuterol PRN q4 hrs. Notify physician if condition deteriorates. MDI THERAPY with  [physician-ordered bronchodilator(s)] via spacer TID PRN. If unable to utilize MDI: HHN [physician-ordered bronchodilator(s)] TID PRN. Notify physician if condition deteriorates. If Acuity Level is 2, 3, or 4 in any of the following:    [] COUGH     [] SURGICAL HISTORY (SURG HX)  [x] CHEST XRAY (CXR)    Goal: Improvement in sputum mobilization in patients with ineffective airway clearance. Reverse atelectasis. [x] Bronchopulmonary Hygiene Protocol    Total Acuity:   16-32  []  Secondary Assessment in 24 hrs Total Acuity:  9-15  [x]  Secondary Assessment in 24 hrs Total Acuity:  4-8  []  Secondary Assessment in 48 hrs Total Acuity:  0-3  []  Secondary Assessment in 72 hrs   METANEB QID with [physician-ordered bronchodilator(s)] if CXR Acuity = 4; otherwise:  PD&P, PEP, or Vest QID & PRN  NT Sxn PRN for ineffective cough  METANEB QID with [physician-ordered bronchodilator(s)] if CXR Acuity = 4; otherwise:  PD&P, PEP, or Vest TID & PRN  NT Sxn PRN for ineffective cough  Instruct patient to self-perform IS q1hr WA   Directed Cough self-performed q1hr WA     If Acuity Level is 2 or above in the following:    [] PULMONARY HISTORY (PULM HX)    Goal: Assist patient in quitting smoking to slow or stop the progression of lung disease.     [] Smoking Cessation Protocol    SMOKING CESSATION EDUCATION provided according to policy XV_825: (agatha with an X)  ____Yes    ____ No     ____ NA    Smoking Cessation Booklet given:  ____Yes  ____No ____Patient Demetria Ramirez

## 2021-06-11 NOTE — PLAN OF CARE
Problem: Falls - Risk of:  Goal: Will remain free from falls  Description: Will remain free from falls  6/11/2021 1607 by Aman Funes RN  Outcome: Ongoing     Problem: Falls - Risk of:  Goal: Absence of physical injury  Description: Absence of physical injury  Outcome: Ongoing     Problem: Skin Integrity:  Goal: Will show no infection signs and symptoms  Description: Will show no infection signs and symptoms  Outcome: Ongoing     Problem: Skin Integrity:  Goal: Absence of new skin breakdown  Description: Absence of new skin breakdown  6/11/2021 1607 by Aman Funes RN  Outcome: Ongoing     Problem: Nutrition  Goal: Optimal nutrition therapy  Outcome: Ongoing     Problem: OXYGENATION/RESPIRATORY FUNCTION  Goal: Patient will maintain patent airway  6/11/2021 1607 by Aman Funes RN  Outcome: Ongoing     Problem: OXYGENATION/RESPIRATORY FUNCTION  Goal: Patient will achieve/maintain normal respiratory rate/effort  Description: Respiratory rate and effort will be within normal limits for the patient  Outcome: Ongoing     Problem: MECHANICAL VENTILATION  Goal: Patient will maintain patent airway  Outcome: Ongoing     Problem: MECHANICAL VENTILATION  Goal: Oral health is maintained or improved  Outcome: Ongoing     Problem: MECHANICAL VENTILATION  Goal: Tracheostomy will be managed safely  Outcome: Ongoing     Problem: MECHANICAL VENTILATION  Goal: ET tube will be managed safely  Outcome: Ongoing     Problem: MECHANICAL VENTILATION  Goal: Ability to express needs and understand communication  Outcome: Ongoing     Problem: MECHANICAL VENTILATION  Goal: Mobility/activity is maintained at optimum level for patient  Outcome: Ongoing     Problem: SKIN INTEGRITY  Goal: Skin integrity is maintained or improved  Outcome: Ongoing     Problem: NUTRITION  Goal: Nutritional status is improving  Outcome: Ongoing     Problem: Nutrition Deficit:  Goal: Ability to achieve adequate nutritional intake will

## 2021-06-11 NOTE — PLAN OF CARE
Problem: Falls - Risk of:  Goal: Will remain free from falls  Description: Will remain free from falls  Outcome: Met This Shift  Note: Fall precautions in place. Side rails up x2. Call light in reach. Frequent reorientation to situation. Bed alarm on. Problem: Skin Integrity:  Goal: Absence of new skin breakdown  Description: Absence of new skin breakdown  Outcome: Ongoing  Note: Protective barrier in place on nose. Bi PAP on. No presence of new skin breakdown. Problem: OXYGENATION/RESPIRATORY FUNCTION  Goal: Patient will maintain patent airway  Outcome: Ongoing  Note: Patient is on Bi PAP. Monitoring oxygen saturation. Problem: Nutrition Deficit:  Goal: Ability to achieve adequate nutritional intake will improve  Description: Ability to achieve adequate nutritional intake will improve  Outcome: Ongoing  Note: Encouraging intake at meal times.

## 2021-06-11 NOTE — PROGRESS NOTES
Assessment performed. Lung sounds are diminished throughout and denies shortness of breath. Patient is oriented to person, place, and time. Assisted to [facility] then returned to bed with assistance. Remains on 1L O2 while visiting with family. Denies any other needs at this time. Call light within reach and bed alarm on.

## 2021-06-11 NOTE — PROGRESS NOTES
Patients  out to nurses station and informs RN patient needs a break from BiPAP for awhile. Per patient request BiPAP removed and placed on 2L of oxygen per nasal cannula. Monitoring O2 saturation.

## 2021-06-12 PROBLEM — J96.21 ACUTE ON CHRONIC RESPIRATORY FAILURE WITH HYPOXIA AND HYPERCAPNIA (HCC): Status: ACTIVE | Noted: 2017-08-11

## 2021-06-12 PROBLEM — J96.22 ACUTE ON CHRONIC RESPIRATORY FAILURE WITH HYPOXIA AND HYPERCAPNIA (HCC): Status: ACTIVE | Noted: 2017-08-11

## 2021-06-12 LAB
ABSOLUTE EOS #: <0.03 K/UL (ref 0–0.44)
ABSOLUTE IMMATURE GRANULOCYTE: <0.03 K/UL (ref 0–0.3)
ABSOLUTE LYMPH #: 1.27 K/UL (ref 1.1–3.7)
ABSOLUTE MONO #: 0.7 K/UL (ref 0.1–1.2)
ALLEN TEST: ABNORMAL
ANION GAP SERPL CALCULATED.3IONS-SCNC: ABNORMAL MMOL/L (ref 9–17)
BASOPHILS # BLD: 0 % (ref 0–2)
BASOPHILS ABSOLUTE: <0.03 K/UL (ref 0–0.2)
BUN BLDV-MCNC: 24 MG/DL (ref 8–23)
BUN/CREAT BLD: 46 (ref 9–20)
CALCIUM SERPL-MCNC: 8.9 MG/DL (ref 8.6–10.4)
CARBOXYHEMOGLOBIN: ABNORMAL % (ref 0–5)
CHLORIDE BLD-SCNC: 87 MMOL/L (ref 98–107)
CO2: >45 MMOL/L (ref 20–31)
CREAT SERPL-MCNC: 0.52 MG/DL (ref 0.5–0.9)
CULTURE: ABNORMAL
DIFFERENTIAL TYPE: ABNORMAL
EOSINOPHILS RELATIVE PERCENT: 0 % (ref 1–4)
FIO2: 32
GFR AFRICAN AMERICAN: >60 ML/MIN
GFR NON-AFRICAN AMERICAN: >60 ML/MIN
GFR SERPL CREATININE-BSD FRML MDRD: ABNORMAL ML/MIN/{1.73_M2}
GFR SERPL CREATININE-BSD FRML MDRD: ABNORMAL ML/MIN/{1.73_M2}
GLUCOSE BLD-MCNC: 91 MG/DL (ref 70–99)
HCO3 ARTERIAL: 51.5 MMOL/L (ref 22–26)
HCT VFR BLD CALC: 36.6 % (ref 36.3–47.1)
HEMOGLOBIN: 11.2 G/DL (ref 11.9–15.1)
IMMATURE GRANULOCYTES: 0 %
LYMPHOCYTES # BLD: 18 % (ref 24–43)
Lab: ABNORMAL
MCH RBC QN AUTO: 31.3 PG (ref 25.2–33.5)
MCHC RBC AUTO-ENTMCNC: 30.6 G/DL (ref 28.4–34.8)
MCV RBC AUTO: 102.2 FL (ref 82.6–102.9)
METHEMOGLOBIN: ABNORMAL % (ref 0–1.9)
MODE: ABNORMAL
MONOCYTES # BLD: 10 % (ref 3–12)
NEGATIVE BASE EXCESS, ART: ABNORMAL MMOL/L (ref 0–2)
NOTIFICATION TIME: ABNORMAL
NOTIFICATION: ABNORMAL
NRBC AUTOMATED: 0 PER 100 WBC
O2 DEVICE/FLOW/%: ABNORMAL
O2 SAT, ARTERIAL: 95.9 % (ref 95–98)
OXYHEMOGLOBIN: ABNORMAL % (ref 95–98)
PATIENT TEMP: 37
PCO2 ARTERIAL: 94.4 MMHG (ref 35–45)
PCO2, ART, TEMP ADJ: ABNORMAL (ref 35–45)
PDW BLD-RTO: 12.1 % (ref 11.8–14.4)
PEEP/CPAP: ABNORMAL
PH ARTERIAL: 7.36 (ref 7.35–7.45)
PH, ART, TEMP ADJ: ABNORMAL (ref 7.35–7.45)
PLATELET # BLD: ABNORMAL K/UL (ref 138–453)
PLATELET ESTIMATE: ABNORMAL
PLATELET, FLUORESCENCE: 121 K/UL (ref 138–453)
PLATELET, IMMATURE FRACTION: 6.4 % (ref 1.1–10.3)
PMV BLD AUTO: ABNORMAL FL (ref 8.1–13.5)
PO2 ARTERIAL: 89.5 MMHG (ref 80–100)
PO2, ART, TEMP ADJ: ABNORMAL MMHG (ref 80–100)
POSITIVE BASE EXCESS, ART: 20.1 MMOL/L (ref 0–2)
POTASSIUM SERPL-SCNC: 4.6 MMOL/L (ref 3.7–5.3)
PSV: ABNORMAL
PT. POSITION: ABNORMAL
RBC # BLD: 3.58 M/UL (ref 3.95–5.11)
RBC # BLD: ABNORMAL 10*6/UL
RESPIRATORY RATE: ABNORMAL
SAMPLE SITE: ABNORMAL
SEG NEUTROPHILS: 71 % (ref 36–65)
SEGMENTED NEUTROPHILS ABSOLUTE COUNT: 4.96 K/UL (ref 1.5–8.1)
SET RATE: ABNORMAL
SODIUM BLD-SCNC: 133 MMOL/L (ref 135–144)
SPECIMEN DESCRIPTION: ABNORMAL
TEXT FOR RESPIRATORY: 18
TOTAL HB: ABNORMAL G/DL (ref 12–16)
TOTAL RATE: ABNORMAL
VT: ABNORMAL
WBC # BLD: 7 K/UL (ref 3.5–11.3)
WBC # BLD: ABNORMAL 10*3/UL

## 2021-06-12 PROCEDURE — 2700000000 HC OXYGEN THERAPY PER DAY

## 2021-06-12 PROCEDURE — 94669 MECHANICAL CHEST WALL OSCILL: CPT

## 2021-06-12 PROCEDURE — 82805 BLOOD GASES W/O2 SATURATION: CPT

## 2021-06-12 PROCEDURE — 97110 THERAPEUTIC EXERCISES: CPT

## 2021-06-12 PROCEDURE — 94660 CPAP INITIATION&MGMT: CPT

## 2021-06-12 PROCEDURE — 6360000002 HC RX W HCPCS: Performed by: INTERNAL MEDICINE

## 2021-06-12 PROCEDURE — 85055 RETICULATED PLATELET ASSAY: CPT

## 2021-06-12 PROCEDURE — 97535 SELF CARE MNGMENT TRAINING: CPT

## 2021-06-12 PROCEDURE — 36415 COLL VENOUS BLD VENIPUNCTURE: CPT

## 2021-06-12 PROCEDURE — 6370000000 HC RX 637 (ALT 250 FOR IP): Performed by: INTERNAL MEDICINE

## 2021-06-12 PROCEDURE — 36600 WITHDRAWAL OF ARTERIAL BLOOD: CPT

## 2021-06-12 PROCEDURE — 94640 AIRWAY INHALATION TREATMENT: CPT

## 2021-06-12 PROCEDURE — 94664 DEMO&/EVAL PT USE INHALER: CPT

## 2021-06-12 PROCEDURE — 2000000000 HC ICU R&B

## 2021-06-12 PROCEDURE — 94761 N-INVAS EAR/PLS OXIMETRY MLT: CPT

## 2021-06-12 PROCEDURE — 80048 BASIC METABOLIC PNL TOTAL CA: CPT

## 2021-06-12 PROCEDURE — 6360000002 HC RX W HCPCS: Performed by: NURSE PRACTITIONER

## 2021-06-12 PROCEDURE — 6370000000 HC RX 637 (ALT 250 FOR IP): Performed by: NURSE PRACTITIONER

## 2021-06-12 PROCEDURE — 85025 COMPLETE CBC W/AUTO DIFF WBC: CPT

## 2021-06-12 PROCEDURE — 2580000003 HC RX 258: Performed by: NURSE PRACTITIONER

## 2021-06-12 PROCEDURE — 97116 GAIT TRAINING THERAPY: CPT

## 2021-06-12 PROCEDURE — 2580000003 HC RX 258: Performed by: INTERNAL MEDICINE

## 2021-06-12 RX ADMIN — IPRATROPIUM BROMIDE AND ALBUTEROL SULFATE 1 AMPULE: .5; 3 SOLUTION RESPIRATORY (INHALATION) at 11:33

## 2021-06-12 RX ADMIN — PREDNISONE 40 MG: 20 TABLET ORAL at 08:29

## 2021-06-12 RX ADMIN — SODIUM CHLORIDE: 9 INJECTION, SOLUTION INTRAVENOUS at 12:52

## 2021-06-12 RX ADMIN — IPRATROPIUM BROMIDE AND ALBUTEROL SULFATE 1 AMPULE: .5; 3 SOLUTION RESPIRATORY (INHALATION) at 16:35

## 2021-06-12 RX ADMIN — ACETAMINOPHEN 650 MG: 325 TABLET, FILM COATED ORAL at 03:07

## 2021-06-12 RX ADMIN — CEFEPIME HYDROCHLORIDE 2000 MG: 2 INJECTION, POWDER, FOR SOLUTION INTRAVENOUS at 07:19

## 2021-06-12 RX ADMIN — LEVOTHYROXINE SODIUM 175 MCG: 150 TABLET ORAL at 07:20

## 2021-06-12 RX ADMIN — POTASSIUM CHLORIDE 20 MEQ: 1500 TABLET, EXTENDED RELEASE ORAL at 08:28

## 2021-06-12 RX ADMIN — FUROSEMIDE 20 MG: 20 TABLET ORAL at 08:29

## 2021-06-12 RX ADMIN — CEFEPIME HYDROCHLORIDE 2000 MG: 2 INJECTION, POWDER, FOR SOLUTION INTRAVENOUS at 18:49

## 2021-06-12 RX ADMIN — IPRATROPIUM BROMIDE AND ALBUTEROL SULFATE 1 AMPULE: .5; 3 SOLUTION RESPIRATORY (INHALATION) at 08:11

## 2021-06-12 RX ADMIN — IPRATROPIUM BROMIDE AND ALBUTEROL SULFATE 1 AMPULE: .5; 3 SOLUTION RESPIRATORY (INHALATION) at 20:19

## 2021-06-12 RX ADMIN — PANTOPRAZOLE SODIUM 40 MG: 40 TABLET, DELAYED RELEASE ORAL at 07:20

## 2021-06-12 RX ADMIN — HYDROXYCHLOROQUINE SULFATE 200 MG: 200 TABLET ORAL at 08:28

## 2021-06-12 RX ADMIN — SERTRALINE HYDROCHLORIDE 50 MG: 50 TABLET ORAL at 08:29

## 2021-06-12 RX ADMIN — ENOXAPARIN SODIUM 40 MG: 40 INJECTION SUBCUTANEOUS at 08:29

## 2021-06-12 RX ADMIN — SPIRONOLACTONE 25 MG: 25 TABLET, FILM COATED ORAL at 08:29

## 2021-06-12 ASSESSMENT — PAIN SCALES - GENERAL
PAINLEVEL_OUTOF10: 0

## 2021-06-12 NOTE — PROGRESS NOTES
Progress Note  Tuyet Smith MD    OBJECTIVE:    Patient seen for f/u of Acute on chronic respiratory failure with hypoxia and hypercapnia ([address] She remains hypercarbic     ROS:   Constitutional: negative  for fevers, and negative for chills. Respiratory: positive for shortness of breath, negative for cough, and negative for wheezing  Cardiovascular: negative for chest pain, and negative for palpitations  Gastrointestinal: negative for abdominal pain, negative for nausea,negative for vomiting, negative for diarrhea, and negative for constipation     All other systems were reviewed with the patient and are negative unless otherwise stated in HPI    OBJECTIVE:  Vitals:   Temp: 97.1 °F (36.2 °C)  BP: 113/63  Resp: 20  Pulse: 100  SpO2: 92 %    24HR INTAKE/OUTPUT:      Intake/Output Summary (Last 24 hours) at 6/12/2021 0948  Last data filed at 6/12/2021 0851  Gross per 24 hour   Intake 2641 ml   Output 950 ml   Net 1691 ml     -----------------------------------------------------------------  Exam:  GEN:    Awake, alert and oriented x3. EYES:  EOMI, pupils equal   NECK: Supple. No lymphadenopathy. No carotid bruit  CVS:    regular rate and rhythm, no audible murmur  PULM:  diminished but clear without wheezing, rales or rhonchi, no acute respiratory distress  ABD:    Bowels sounds normal.  Abdomen is soft. No distention. no tenderness to palpation. EXT:   no edema bilaterally . No calf tenderness. NEURO: Moves all extremities. Motor and sensory are grossly intact  SKIN:  No rashes.   No skin lesions.    -----------------------------------------------------------------  Diagnostic Data:    · All available data reviewed  Lab Results   Component Value Date    WBC 7.0 06/12/2021    HGB 11.2 (L) 06/12/2021    .2 06/12/2021    PLT See Reflexed IPF Result 06/12/2021      Lab Results   Component Value Date    GLUCOSE 91 06/12/2021    BUN 24 (H) 06/12/2021    CREATININE 0.52 06/12/2021     (L) 06/12/2021    K 4.6 06/12/2021    CALCIUM 8.9 06/12/2021    CL 87 (L) 06/12/2021    CO2 >45 (HH) 06/12/2021       PROBLEM LIST:  Principal Problem:    Acute on chronic respiratory failure with hypoxia and hypercapnia (HCC)  Active Problems:    Severe malnutrition (HCC)    Respiratory acidosis    Transient alteration of awareness    COPD exacerbation (HCC)  Resolved Problems:    * No resolved hospital problems.  *      ASSESSMENT / PLAN:  Acute on chronic respiratory failure with hypoxia and hypercapnia (HCC)  · Continue current therapy  · bipap  ·   · Nutrition status: at risk for malnutrition  · DVT prophylaxis: Lovenox   · High risk medications: none   · Disposition:  Discharge plan is home    Rivera Tony MD , M.D.  6/12/2021  9:48 AM

## 2021-06-12 NOTE — PLAN OF CARE
Problem: Falls - Risk of:  Goal: Will remain free from falls  Description: Will remain free from falls  6/12/2021 1028 by Thony Page RN  Outcome: Ongoing  Note: Patient at high risk for falls. Fall precautions in place. 6/11/2021 2104 by Roseline Cardozo RN  Outcome: Ongoing  Note: Fall risk assessment done and patient is a high risk for falls. Alarms on as needed for patient safety. Patient being monitored on a regular basis. No falls noted at this time. Goal: Absence of physical injury  Description: Absence of physical injury  Note: Patient free from physical injury. Bed in low and locked position at all times. Call light within reach at all times. Problem: Skin Integrity:  Goal: Will show no infection signs and symptoms  Description: Will show no infection signs and symptoms  6/12/2021 1028 by Thony Page RN  Note: Patient remains free from infection. 6/11/2021 2104 by Roseline Cardozo RN  Outcome: Ongoing  Note: Patient is able to turn self. No new open areas or redness noted. Will continue to assess     Goal: Absence of new skin breakdown  Description: Absence of new skin breakdown  Note: Patient without new area of skin breakdown. Turns and repositions self in bed and in chair. Problem: Nutrition  Goal: Optimal nutrition therapy  Outcome: Ongoing  Note: Patient encouraged to eat >75% of meals. Encouraged to consume >75% of supplement. Problem: OXYGENATION/RESPIRATORY FUNCTION  Goal: Patient will maintain patent airway  6/12/2021 1028 by Thony Page RN  Outcome: Ongoing  Note: Patient to wear Bipap between meals and at bedtime. PCO2 remains elevated. 6/11/2021 2104 by Roseline Cardozo RN  Outcome: Ongoing  Note: Patient is able to cough and maintain airway.   Goal: Patient will achieve/maintain normal respiratory rate/effort  Description: Respiratory rate and effort will be within normal limits for the patient  Outcome: Ongoing  Note: Respirations unlabored at rest.       Problem: SKIN INTEGRITY  Goal: Skin integrity is maintained or improved  Outcome: Ongoing  Note: Skin integrity maintained. Patient able to turn and reposition self in bed and in chair.

## 2021-06-12 NOTE — PROGRESS NOTES
[address] [facility]  OCCUPATIONAL THERAPY  No Visit Note    [x] ICU    [] Acute   Patient: Nohemi Mendenhall  Room: [facility] not seen on 6/12/2021 at 8:56 AM due to pt with RN then Doctor arrival, will check back as schedule allows.           Signature: Breann ROLON
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
## 2021-06-12 NOTE — PROGRESS NOTES
[facility]  Other Apparatus: O2  Assistance: Contact guard assistance  Quality of Gait: SPO2 decreased to 83%, but recovered to 91% with proper breathing technique. NRSG has pt on Bipap this afternoon. Distance: 5ftx4 fwd and bwd; limited by bipap machine     Balance  Sitting - Static: Good  Sitting - Dynamic: Good  Standing - Static: Fair;+  Standing - Dynamic: Fair  Exercises  Hip Flexion: x15  Hip Abduction: x15  Knee Long Arc Quad: x15  Ankle Pumps: 15x  Comments: Above ex completed in reclined/seated                     Goals  Short term goals  Time Frame for Short term goals: 20 days  Short term goal 1: Pt will be educated on her POC  Short term goal 2: Pt will perform all transfers Mod I inorder toincrease independence  Short term goal 3: Pt will ambulate 100 feet with LRAD Mod I in order to return to PLOF  Short term goal 4: Pt will increase dynamic standing balance to Good in order to reduce fall risk  Patient Goals   Patient goals : \"to get better and go home\"    Plan    Plan  Times per week: 7x/wk  Times per day: Twice a day  Plan weeks: 2x/daily except weekends 1x/daily  Current Treatment Recommendations: Strengthening, Neuromuscular Re-education, Home Exercise Program, Manual Therapy - Soft Tissue Mobilization, Safety Education & Training, Balance Training, Endurance Training, Patient/Caregiver Education & Training, Functional Mobility Training, Transfer Training, Gait Training  Safety Devices  Type of devices:  All fall risk precautions in place, Nurse notified, Call light within reach, Patient at risk for falls, Left in chair, Gait belt     Therapy Time   Individual Concurrent Group Co-treatment   Time In 1400         Time Out 1425         Minutes 25         Timed Code Treatment Minutes: [address] Basilio Graf, PT, DPT
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
## 2021-06-12 NOTE — PLAN OF CARE
Problem: Falls - Risk of:  Goal: Will remain free from falls  Description: Will remain free from falls  6/11/2021 2104 by Sonu Everett RN  Outcome: Ongoing  Note: Fall risk assessment done and patient is a high risk for falls. Alarms on as needed for patient safety. Patient being monitored on a regular basis. No falls noted at this time. Problem: Skin Integrity:  Goal: Will show no infection signs and symptoms  Description: Will show no infection signs and symptoms  6/11/2021 2104 by Sonu Everett RN  Outcome: Ongoing  Note: Patient is able to turn self. No new open areas or redness noted. Will continue to assess        Problem: OXYGENATION/RESPIRATORY FUNCTION  Goal: Patient will maintain patent airway  6/11/2021 2104 by Sonu Everett RN  Outcome: Ongoing  Note: Patient is able to cough and maintain airway.

## 2021-06-13 LAB
ALLEN TEST: ABNORMAL
ANION GAP SERPL CALCULATED.3IONS-SCNC: 0 MMOL/L (ref 9–17)
BUN BLDV-MCNC: 20 MG/DL (ref 8–23)
BUN/CREAT BLD: 40 (ref 9–20)
CALCIUM SERPL-MCNC: 8.6 MG/DL (ref 8.6–10.4)
CARBOXYHEMOGLOBIN: ABNORMAL % (ref 0–5)
CHLORIDE BLD-SCNC: 90 MMOL/L (ref 98–107)
CO2: 44 MMOL/L (ref 20–31)
CREAT SERPL-MCNC: 0.5 MG/DL (ref 0.5–0.9)
FIO2: ABNORMAL
GFR AFRICAN AMERICAN: >60 ML/MIN
GFR NON-AFRICAN AMERICAN: >60 ML/MIN
GFR SERPL CREATININE-BSD FRML MDRD: ABNORMAL ML/MIN/{1.73_M2}
GFR SERPL CREATININE-BSD FRML MDRD: ABNORMAL ML/MIN/{1.73_M2}
GLUCOSE BLD-MCNC: 91 MG/DL (ref 70–99)
HCO3 ARTERIAL: 43.9 MMOL/L (ref 22–26)
METHEMOGLOBIN: ABNORMAL % (ref 0–1.9)
MODE: ABNORMAL
NEGATIVE BASE EXCESS, ART: ABNORMAL MMOL/L (ref 0–2)
NOTIFICATION TIME: ABNORMAL
NOTIFICATION: ABNORMAL
O2 DEVICE/FLOW/%: ABNORMAL
O2 SAT, ARTERIAL: 95 % (ref 95–98)
OXYHEMOGLOBIN: ABNORMAL % (ref 95–98)
PATIENT TEMP: 37
PCO2 ARTERIAL: 75.6 MMHG (ref 35–45)
PCO2, ART, TEMP ADJ: ABNORMAL
PEEP/CPAP: ABNORMAL
PH ARTERIAL: 7.38 (ref 7.35–7.45)
PH, ART, TEMP ADJ: ABNORMAL (ref 7.35–7.45)
PO2 ARTERIAL: 79.2 MMHG (ref 80–100)
PO2, ART, TEMP ADJ: ABNORMAL MMHG (ref 80–100)
POSITIVE BASE EXCESS, ART: 14.7 MMOL/L (ref 0–2)
POTASSIUM SERPL-SCNC: 4.3 MMOL/L (ref 3.7–5.3)
PSV: ABNORMAL
PT. POSITION: ABNORMAL
RESPIRATORY RATE: ABNORMAL
SAMPLE SITE: ABNORMAL
SET RATE: ABNORMAL
SODIUM BLD-SCNC: 134 MMOL/L (ref 135–144)
TEXT FOR RESPIRATORY: ABNORMAL
TOTAL HB: ABNORMAL G/DL (ref 12–16)
TOTAL RATE: ABNORMAL
VT: ABNORMAL

## 2021-06-13 PROCEDURE — 6370000000 HC RX 637 (ALT 250 FOR IP): Performed by: INTERNAL MEDICINE

## 2021-06-13 PROCEDURE — 97116 GAIT TRAINING THERAPY: CPT

## 2021-06-13 PROCEDURE — 94660 CPAP INITIATION&MGMT: CPT

## 2021-06-13 PROCEDURE — 97110 THERAPEUTIC EXERCISES: CPT

## 2021-06-13 PROCEDURE — 80048 BASIC METABOLIC PNL TOTAL CA: CPT

## 2021-06-13 PROCEDURE — 36415 COLL VENOUS BLD VENIPUNCTURE: CPT

## 2021-06-13 PROCEDURE — 82805 BLOOD GASES W/O2 SATURATION: CPT

## 2021-06-13 PROCEDURE — 94669 MECHANICAL CHEST WALL OSCILL: CPT

## 2021-06-13 PROCEDURE — 94761 N-INVAS EAR/PLS OXIMETRY MLT: CPT

## 2021-06-13 PROCEDURE — 2000000000 HC ICU R&B

## 2021-06-13 PROCEDURE — 2580000003 HC RX 258: Performed by: NURSE PRACTITIONER

## 2021-06-13 PROCEDURE — 6360000002 HC RX W HCPCS: Performed by: NURSE PRACTITIONER

## 2021-06-13 PROCEDURE — 2580000003 HC RX 258: Performed by: INTERNAL MEDICINE

## 2021-06-13 PROCEDURE — 36600 WITHDRAWAL OF ARTERIAL BLOOD: CPT

## 2021-06-13 PROCEDURE — 6360000002 HC RX W HCPCS: Performed by: INTERNAL MEDICINE

## 2021-06-13 PROCEDURE — 94664 DEMO&/EVAL PT USE INHALER: CPT

## 2021-06-13 PROCEDURE — 6370000000 HC RX 637 (ALT 250 FOR IP): Performed by: NURSE PRACTITIONER

## 2021-06-13 PROCEDURE — 94640 AIRWAY INHALATION TREATMENT: CPT

## 2021-06-13 PROCEDURE — 2700000000 HC OXYGEN THERAPY PER DAY

## 2021-06-13 RX ADMIN — SODIUM CHLORIDE: 9 INJECTION, SOLUTION INTRAVENOUS at 01:17

## 2021-06-13 RX ADMIN — PREDNISONE 40 MG: 20 TABLET ORAL at 08:59

## 2021-06-13 RX ADMIN — SODIUM CHLORIDE, PRESERVATIVE FREE 10 ML: 5 INJECTION INTRAVENOUS at 12:05

## 2021-06-13 RX ADMIN — ONDANSETRON 4 MG: 2 INJECTION INTRAMUSCULAR; INTRAVENOUS at 12:05

## 2021-06-13 RX ADMIN — LEVOTHYROXINE SODIUM 175 MCG: 150 TABLET ORAL at 07:21

## 2021-06-13 RX ADMIN — IPRATROPIUM BROMIDE AND ALBUTEROL SULFATE 1 AMPULE: .5; 3 SOLUTION RESPIRATORY (INHALATION) at 16:29

## 2021-06-13 RX ADMIN — POTASSIUM CHLORIDE 20 MEQ: 1500 TABLET, EXTENDED RELEASE ORAL at 08:59

## 2021-06-13 RX ADMIN — SPIRONOLACTONE 25 MG: 25 TABLET, FILM COATED ORAL at 08:59

## 2021-06-13 RX ADMIN — CEFEPIME HYDROCHLORIDE 2000 MG: 2 INJECTION, POWDER, FOR SOLUTION INTRAVENOUS at 07:21

## 2021-06-13 RX ADMIN — IPRATROPIUM BROMIDE AND ALBUTEROL SULFATE 1 AMPULE: .5; 3 SOLUTION RESPIRATORY (INHALATION) at 10:40

## 2021-06-13 RX ADMIN — PANTOPRAZOLE SODIUM 40 MG: 40 TABLET, DELAYED RELEASE ORAL at 07:21

## 2021-06-13 RX ADMIN — SODIUM CHLORIDE, PRESERVATIVE FREE 10 ML: 5 INJECTION INTRAVENOUS at 18:58

## 2021-06-13 RX ADMIN — HYDROXYCHLOROQUINE SULFATE 200 MG: 200 TABLET ORAL at 08:59

## 2021-06-13 RX ADMIN — CEFEPIME HYDROCHLORIDE 2000 MG: 2 INJECTION, POWDER, FOR SOLUTION INTRAVENOUS at 18:58

## 2021-06-13 RX ADMIN — SODIUM CHLORIDE, PRESERVATIVE FREE 10 ML: 5 INJECTION INTRAVENOUS at 08:59

## 2021-06-13 RX ADMIN — ENOXAPARIN SODIUM 40 MG: 40 INJECTION SUBCUTANEOUS at 08:59

## 2021-06-13 RX ADMIN — FUROSEMIDE 20 MG: 20 TABLET ORAL at 08:59

## 2021-06-13 RX ADMIN — IPRATROPIUM BROMIDE AND ALBUTEROL SULFATE 1 AMPULE: .5; 3 SOLUTION RESPIRATORY (INHALATION) at 04:58

## 2021-06-13 RX ADMIN — IPRATROPIUM BROMIDE AND ALBUTEROL SULFATE 1 AMPULE: .5; 3 SOLUTION RESPIRATORY (INHALATION) at 20:34

## 2021-06-13 RX ADMIN — SERTRALINE HYDROCHLORIDE 50 MG: 50 TABLET ORAL at 08:59

## 2021-06-13 ASSESSMENT — PAIN SCALES - GENERAL
PAINLEVEL_OUTOF10: 0

## 2021-06-13 NOTE — PLAN OF CARE
Problem: Falls - Risk of:  Goal: Will remain free from falls  Description: Will remain free from falls  6/13/2021 0945 by Iván Castro RN  Outcome: Ongoing  Note: Patient at high risk for falls. Fall precautions in place. Call light within reach at all times. 6/12/2021 2125 by Chiquita Kam RN  Outcome: Ongoing  Note: Fall risk assessment done and patient is a high risk for falls. Alarms on as needed for patient safety. Patient being monitored on a regular basis. No falls noted at this time. Goal: Absence of physical injury  Description: Absence of physical injury  Outcome: Met This Shift  Note: Patient remains free from injury. Bed in low and locked position at all times. Problem: Skin Integrity:  Goal: Will show no infection signs and symptoms  Description: Will show no infection signs and symptoms  6/13/2021 0945 by Iván Castro RN  Outcome: Met This Shift  Note: Patient WBC WNL. Afebrile. Vitals stable. 6/12/2021 2125 by Chiquita Kam RN  Outcome: Ongoing  Note: Patient is able to turn self. No new open areas or redness noted. Will continue to assess     Goal: Absence of new skin breakdown  Description: Absence of new skin breakdown  Outcome: Ongoing  Note: Patient without new skin breakdown. Patient turns and repositions self in bed and in chair. Problem: Nutrition  Goal: Optimal nutrition therapy  Outcome: Ongoing  Note: Patient encouraged to eat >75% of meals. Encouraged to drink 100% of supplement. Problem: OXYGENATION/RESPIRATORY FUNCTION  Goal: Patient will maintain patent airway  6/13/2021 0945 by Iván Castro RN  Note: Patient able to maintain a patent airway. Bipap on in between meals. Wears oxygen @ 1 liter per nasal cannula during meals. 6/12/2021 2125 by Chiquita Kam RN  Outcome: Ongoing  Note: Patient is able to cough and maintain airway.    Goal: Patient will achieve/maintain normal respiratory rate/effort  Description: Respiratory rate and effort will be within normal limits for the patient  Outcome: Ongoing  Note: Patient with dyspnea on exertion noted. Otherwise unlabored. Problem: SKIN INTEGRITY  Goal: Skin integrity is maintained or improved  Note: Skin integrity maintained. Problem: NUTRITION  Goal: Nutritional status is improving  Outcome: Ongoing  Note: Patient eating >75% of meals. Problem: Nutrition Deficit:  Goal: Ability to achieve adequate nutritional intake will improve  Description: Ability to achieve adequate nutritional intake will improve  Outcome: Ongoing  Note: Patient encouraged to eat >75% of meals.
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
## 2021-06-13 NOTE — PROGRESS NOTES
Progress Note  Tuyet Smith MD    OBJECTIVE:    Patient seen for f/u of Acute on chronic respiratory failure with hypoxia and hypercapnia ([address] She is hypercarbic -little better today    ROS:   Constitutional: negative  for fevers, and negative for chills. Respiratory: positive for shortness of breath, negative for cough, and negative for wheezing  Cardiovascular: negative for chest pain, and negative for palpitations  Gastrointestinal: negative for abdominal pain, negative for nausea,negative for vomiting, negative for diarrhea, and negative for constipation     All other systems were reviewed with the patient and are negative unless otherwise stated in HPI    OBJECTIVE:  Vitals:   Temp: 97.7 °F (36.5 °C)  BP: (!) 146/71  Resp: 23  Pulse: 92  SpO2: 94 %    24HR INTAKE/OUTPUT:      Intake/Output Summary (Last 24 hours) at 6/13/2021 0908  Last data filed at 6/13/2021 0717  Gross per 24 hour   Intake 2611 ml   Output 2275 ml   Net 336 ml     -----------------------------------------------------------------  Exam:  GEN:    Awake, alert and oriented x3. EYES:  EOMI, pupils equal   NECK: Supple. No lymphadenopathy. No carotid bruit  CVS:    regular rate and rhythm, no audible murmur  PULM:  diminished but clear without wheezing, rales or rhonchi, no acute respiratory distress  ABD:    Bowels sounds normal.  Abdomen is soft. No distention. no tenderness to palpation. EXT:   no edema bilaterally . No calf tenderness. NEURO: Moves all extremities. Motor and sensory are grossly intact  SKIN:  No rashes.   No skin lesions.    -----------------------------------------------------------------  Diagnostic Data:    · All available data reviewed  Lab Results   Component Value Date    WBC 7.0 06/12/2021    HGB 11.2 (L) 06/12/2021    .2 06/12/2021    PLT See Reflexed IPF Result 06/12/2021      Lab Results   Component Value Date    GLUCOSE 91 06/13/2021    BUN 20 06/13/2021    CREATININE 0.50 06/13/2021    NA 134 (L) 06/13/2021    K 4.3 06/13/2021    CALCIUM 8.6 06/13/2021    CL 90 (L) 06/13/2021    CO2 44 (HH) 06/13/2021       PROBLEM LIST:  Principal Problem:    Acute on chronic respiratory failure with hypoxia and hypercapnia (HCC)  Active Problems:    Severe malnutrition (HCC)    Respiratory acidosis    Transient alteration of awareness    COPD exacerbation (HCC)  Resolved Problems:    * No resolved hospital problems.  *      ASSESSMENT / PLAN:  Acute on chronic respiratory failure with hypoxia and hypercapnia (HCC)  · Continue current therapy of bipap,oxygen  ·   · Nutrition status: at risk for malnutrition  · DVT prophylaxis: Lovenox   · High risk medications: none   · Disposition:  Discharge plan is home    Clarice Jones MD , M.D.  6/13/2021  9:08 AM

## 2021-06-13 NOTE — FLOWSHEET NOTE
Patient's spouse called. Update given. Spouse stated he will be out later to see patient. Patient resting in chair with Bipap on.

## 2021-06-13 NOTE — PLAN OF CARE
Problem: Falls - Risk of:  Goal: Will remain free from falls  Description: Will remain free from falls  6/12/2021 2125 by Lalito Tobar RN  Outcome: Ongoing  Note: Fall risk assessment done and patient is a high risk for falls. Alarms on as needed for patient safety. Patient being monitored on a regular basis. No falls noted at this time. Problem: Skin Integrity:  Goal: Will show no infection signs and symptoms  Description: Will show no infection signs and symptoms  6/12/2021 2125 by Lalito Tobar RN  Outcome: Ongoing  Note: Patient is able to turn self. No new open areas or redness noted. Will continue to assess        Problem: OXYGENATION/RESPIRATORY FUNCTION  Goal: Patient will maintain patent airway  6/12/2021 2125 by Lalito Tobar RN  Outcome: Ongoing  Note: Patient is able to cough and maintain airway.

## 2021-06-13 NOTE — PROGRESS NOTES
better today and appropriate for OT  Vital Signs  Patient Currently in Pain: Denies   Orientation  Orientation  Overall Orientation Status: Within Functional Limits  Objective                                           Cognition  Overall Cognitive Status: WFL                    Type of ROM/Therapeutic Exercise  Type of ROM/Therapeutic Exercise: Free weights  Comment: Therapist instructed pt in BUE ther ex in order to promote strength and activity tolerance with ADL participation. pt completes 15 reps x 5 planes of motion with 1# weight. Pt falling asleep throughout requiring  verbal instruction to stay on task. Pt HR staying at 124, sp02 68-72%. Did not attend transfers at this time. Plan   Plan  Times per week: 7  Times per day: Daily  Current Treatment Recommendations: Strengthening, Safety Education & Training, Patient/Caregiver Education & Training, Self-Care / ADL, Functional Mobility Training, Endurance Training  G-Code     OutComes Score                                                  AM-PAC Score             Goals  Short term goals  Time Frame for Short term goals: 20 visits  Short term goal 1: Patient will tolerate 5 minutes of self-care/ADL task with no more than 2 rest breaks to manage O2 levels and increase activity tolerance. Short term goal 2: Patient will complete light ther ex for 5 minutes with no more than 2 rest breaks to increase functional strength. Short term goal 3: Patient will complete all transfers with SBA with good safety awareness to manage tubing to increase independence with functional mobility.        Therapy Time   Individual Concurrent Group Co-treatment   Time In 1418         Time Out 1428         Minutes [address] [facility]
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
## 2021-06-14 ENCOUNTER — APPOINTMENT (OUTPATIENT)
Dept: GENERAL RADIOLOGY | Age: 71
DRG: 189 | End: 2021-06-14
Payer: MEDICARE

## 2021-06-14 LAB
ALLEN TEST: ABNORMAL
ALLEN TEST: ABNORMAL
ANION GAP SERPL CALCULATED.3IONS-SCNC: 2 MMOL/L (ref 9–17)
BUN BLDV-MCNC: 26 MG/DL (ref 8–23)
BUN/CREAT BLD: 46 (ref 9–20)
CALCIUM SERPL-MCNC: 9 MG/DL (ref 8.6–10.4)
CARBOXYHEMOGLOBIN: ABNORMAL % (ref 0–5)
CARBOXYHEMOGLOBIN: ABNORMAL % (ref 0–5)
CHLORIDE BLD-SCNC: 87 MMOL/L (ref 98–107)
CO2: 42 MMOL/L (ref 20–31)
CREAT SERPL-MCNC: 0.57 MG/DL (ref 0.5–0.9)
CULTURE: NORMAL
FIO2: ABNORMAL
FIO2: ABNORMAL
GFR AFRICAN AMERICAN: >60 ML/MIN
GFR NON-AFRICAN AMERICAN: >60 ML/MIN
GFR SERPL CREATININE-BSD FRML MDRD: ABNORMAL ML/MIN/{1.73_M2}
GFR SERPL CREATININE-BSD FRML MDRD: ABNORMAL ML/MIN/{1.73_M2}
GLUCOSE BLD-MCNC: 124 MG/DL (ref 70–99)
HCO3 ARTERIAL: 47.9 MMOL/L (ref 22–26)
HCO3 ARTERIAL: 53.7 MMOL/L (ref 22–26)
Lab: NORMAL
METHEMOGLOBIN: ABNORMAL % (ref 0–1.9)
METHEMOGLOBIN: ABNORMAL % (ref 0–1.9)
MODE: ABNORMAL
MODE: ABNORMAL
NEGATIVE BASE EXCESS, ART: ABNORMAL MMOL/L (ref 0–2)
NEGATIVE BASE EXCESS, ART: ABNORMAL MMOL/L (ref 0–2)
NOTIFICATION TIME: ABNORMAL
NOTIFICATION TIME: ABNORMAL
NOTIFICATION: ABNORMAL
NOTIFICATION: ABNORMAL
O2 DEVICE/FLOW/%: ABNORMAL
O2 DEVICE/FLOW/%: ABNORMAL
O2 SAT, ARTERIAL: 92.6 % (ref 95–98)
O2 SAT, ARTERIAL: 94.5 % (ref 95–98)
OXYHEMOGLOBIN: ABNORMAL % (ref 95–98)
OXYHEMOGLOBIN: ABNORMAL % (ref 95–98)
PATIENT TEMP: 37
PATIENT TEMP: 37
PCO2 ARTERIAL: 132 MMHG (ref 35–45)
PCO2 ARTERIAL: 86.2 MMHG (ref 35–45)
PCO2, ART, TEMP ADJ: ABNORMAL
PCO2, ART, TEMP ADJ: ABNORMAL
PEEP/CPAP: ABNORMAL
PEEP/CPAP: ABNORMAL
PH ARTERIAL: 7.23 (ref 7.35–7.45)
PH ARTERIAL: 7.36 (ref 7.35–7.45)
PH, ART, TEMP ADJ: ABNORMAL (ref 7.35–7.45)
PH, ART, TEMP ADJ: ABNORMAL (ref 7.35–7.45)
PO2 ARTERIAL: 70.7 MMHG (ref 80–100)
PO2 ARTERIAL: 92.3 MMHG (ref 80–100)
PO2, ART, TEMP ADJ: ABNORMAL MMHG (ref 80–100)
PO2, ART, TEMP ADJ: ABNORMAL MMHG (ref 80–100)
POSITIVE BASE EXCESS, ART: 17.4 MMOL/L (ref 0–2)
POSITIVE BASE EXCESS, ART: 18.8 MMOL/L (ref 0–2)
POTASSIUM SERPL-SCNC: 5.3 MMOL/L (ref 3.7–5.3)
PSV: ABNORMAL
PSV: ABNORMAL
PT. POSITION: ABNORMAL
PT. POSITION: ABNORMAL
RESPIRATORY RATE: ABNORMAL
RESPIRATORY RATE: ABNORMAL
SAMPLE SITE: ABNORMAL
SAMPLE SITE: ABNORMAL
SET RATE: ABNORMAL
SET RATE: ABNORMAL
SODIUM BLD-SCNC: 131 MMOL/L (ref 135–144)
SPECIMEN DESCRIPTION: NORMAL
TEXT FOR RESPIRATORY: ABNORMAL
TEXT FOR RESPIRATORY: ABNORMAL
TOTAL HB: ABNORMAL G/DL (ref 12–16)
TOTAL HB: ABNORMAL G/DL (ref 12–16)
TOTAL RATE: ABNORMAL
TOTAL RATE: ABNORMAL
TSH SERPL DL<=0.05 MIU/L-ACNC: 3.32 MIU/L (ref 0.3–5)
VT: ABNORMAL
VT: ABNORMAL

## 2021-06-14 PROCEDURE — 6370000000 HC RX 637 (ALT 250 FOR IP): Performed by: NURSE PRACTITIONER

## 2021-06-14 PROCEDURE — 94660 CPAP INITIATION&MGMT: CPT

## 2021-06-14 PROCEDURE — 2000000000 HC ICU R&B

## 2021-06-14 PROCEDURE — 71045 X-RAY EXAM CHEST 1 VIEW: CPT

## 2021-06-14 PROCEDURE — 36415 COLL VENOUS BLD VENIPUNCTURE: CPT

## 2021-06-14 PROCEDURE — 6360000002 HC RX W HCPCS: Performed by: NURSE PRACTITIONER

## 2021-06-14 PROCEDURE — 97535 SELF CARE MNGMENT TRAINING: CPT

## 2021-06-14 PROCEDURE — 6360000002 HC RX W HCPCS: Performed by: INTERNAL MEDICINE

## 2021-06-14 PROCEDURE — 94669 MECHANICAL CHEST WALL OSCILL: CPT

## 2021-06-14 PROCEDURE — 2580000003 HC RX 258: Performed by: NURSE PRACTITIONER

## 2021-06-14 PROCEDURE — 2700000000 HC OXYGEN THERAPY PER DAY

## 2021-06-14 PROCEDURE — 94761 N-INVAS EAR/PLS OXIMETRY MLT: CPT

## 2021-06-14 PROCEDURE — 6370000000 HC RX 637 (ALT 250 FOR IP): Performed by: INTERNAL MEDICINE

## 2021-06-14 PROCEDURE — 36600 WITHDRAWAL OF ARTERIAL BLOOD: CPT

## 2021-06-14 PROCEDURE — 84443 ASSAY THYROID STIM HORMONE: CPT

## 2021-06-14 PROCEDURE — 94640 AIRWAY INHALATION TREATMENT: CPT

## 2021-06-14 PROCEDURE — 97110 THERAPEUTIC EXERCISES: CPT

## 2021-06-14 PROCEDURE — 80048 BASIC METABOLIC PNL TOTAL CA: CPT

## 2021-06-14 PROCEDURE — 99233 SBSQ HOSP IP/OBS HIGH 50: CPT | Performed by: INTERNAL MEDICINE

## 2021-06-14 PROCEDURE — 82805 BLOOD GASES W/O2 SATURATION: CPT

## 2021-06-14 PROCEDURE — 2580000003 HC RX 258: Performed by: INTERNAL MEDICINE

## 2021-06-14 RX ADMIN — CEFEPIME HYDROCHLORIDE 2000 MG: 2 INJECTION, POWDER, FOR SOLUTION INTRAVENOUS at 18:29

## 2021-06-14 RX ADMIN — SPIRONOLACTONE 25 MG: 25 TABLET, FILM COATED ORAL at 07:55

## 2021-06-14 RX ADMIN — ENOXAPARIN SODIUM 40 MG: 40 INJECTION SUBCUTANEOUS at 07:55

## 2021-06-14 RX ADMIN — IPRATROPIUM BROMIDE AND ALBUTEROL SULFATE 1 AMPULE: .5; 3 SOLUTION RESPIRATORY (INHALATION) at 05:25

## 2021-06-14 RX ADMIN — FUROSEMIDE 20 MG: 20 TABLET ORAL at 07:55

## 2021-06-14 RX ADMIN — POTASSIUM CHLORIDE 20 MEQ: 1500 TABLET, EXTENDED RELEASE ORAL at 07:55

## 2021-06-14 RX ADMIN — SODIUM CHLORIDE, PRESERVATIVE FREE 10 ML: 5 INJECTION INTRAVENOUS at 18:29

## 2021-06-14 RX ADMIN — PREDNISONE 40 MG: 20 TABLET ORAL at 07:55

## 2021-06-14 RX ADMIN — LEVOTHYROXINE SODIUM 175 MCG: 150 TABLET ORAL at 07:55

## 2021-06-14 RX ADMIN — SERTRALINE HYDROCHLORIDE 50 MG: 50 TABLET ORAL at 07:55

## 2021-06-14 RX ADMIN — METOLAZONE 2.5 MG: 2.5 TABLET ORAL at 07:55

## 2021-06-14 RX ADMIN — IPRATROPIUM BROMIDE AND ALBUTEROL SULFATE 1 AMPULE: .5; 3 SOLUTION RESPIRATORY (INHALATION) at 20:19

## 2021-06-14 RX ADMIN — HYDROXYCHLOROQUINE SULFATE 200 MG: 200 TABLET ORAL at 07:55

## 2021-06-14 RX ADMIN — IPRATROPIUM BROMIDE AND ALBUTEROL SULFATE 1 AMPULE: .5; 3 SOLUTION RESPIRATORY (INHALATION) at 10:45

## 2021-06-14 RX ADMIN — PANTOPRAZOLE SODIUM 40 MG: 40 TABLET, DELAYED RELEASE ORAL at 07:55

## 2021-06-14 RX ADMIN — CEFEPIME HYDROCHLORIDE 2000 MG: 2 INJECTION, POWDER, FOR SOLUTION INTRAVENOUS at 07:05

## 2021-06-14 RX ADMIN — IPRATROPIUM BROMIDE AND ALBUTEROL SULFATE 1 AMPULE: .5; 3 SOLUTION RESPIRATORY (INHALATION) at 15:58

## 2021-06-14 RX ADMIN — SODIUM CHLORIDE, PRESERVATIVE FREE 10 ML: 5 INJECTION INTRAVENOUS at 07:05

## 2021-06-14 ASSESSMENT — PAIN SCALES - GENERAL
PAINLEVEL_OUTOF10: 0

## 2021-06-14 NOTE — PROGRESS NOTES
sp02 79-80%, pt opens eyes with verbal stimulus. bipap remains on, fi02 increased to 32%. Jean Carlos Houston NP aware of critical ABG results, sp02 and bipap settings.
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
## 2021-06-14 NOTE — PROGRESS NOTES
[facility] decided [facility]. Referral made to hospice and paper work fax.   JESSICA Lagunas
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
## 2021-06-14 NOTE — PROGRESS NOTES
Agustin Erm, was evaluated through a synchronous (real-time) audio-video encounter. The patient (or guardian if applicable) is aware that this is a billable service. Verbal consent to proceed has been obtained within the past 12 months. The visit was conducted pursuant to the emergency declaration under the [facility], [address] [place] [address] and the [email or web address] and [employer] Act. Patient identification was verified, and a caregiver was present when appropriate. The patient was located in a state where the provider was credentialed to provide care. Total time spent for this encounter: Not billed by time    --Brina Gill MD on 6/14/2021 at 10:23 AM    An electronic signature was used to authenticate this note. PULMONARY PROGRESS NOTE      Patient:  Agustin Erm  YOB: 1950    MRN: [id number]     Acct: [de-identified]     Admit date: 6/9/2021    REASON FOR CONSULT:-Acute on chronic hypoxemic and hypercarbic respiratory failure    Pt seen and Chart reviewed. Subjective:   Review of Systems -   Could not be done as the patient is on a BiPAP and unresponsive  Information obtained from Ms. Mika Julio CNP  Patient remains on the BiPAP with a setting of 20/10 centimeters of water  Blood gases this morning showed increasing carbon dioxide  No fever  Patient is on 30% oxygen with a PO2 of 92.3  Not much cough  Chest x-ray without significant infiltrate  CT scan of the chest on [address] without significant infiltrate      Diet:  ADULT DIET;  Regular; Low Fat/Low Chol/High Fiber/2 gm Na  Adult Oral Nutrition Supplement; Standard High Calorie/High Protein Oral Supplement      Medications:Current Inpatient    Scheduled Meds:   enoxaparin  40 mg Subcutaneous Daily    cefepime  2,000 mg Intravenous Q12H    furosemide  20 mg Oral Daily    hydroxychloroquine  200 mg Oral Daily    levothyroxine  175 mcg Oral Daily    metOLazone  2.5 mg Oral Once per day on Mon Wed Fri    pantoprazole  40 mg Oral QAM AC    potassium chloride  20 mEq Oral Daily    sertraline  50 mg Oral Daily    spironolactone  25 mg Oral Daily    sodium chloride flush  5-40 mL Intravenous 2 times per day    predniSONE  40 mg Oral Daily    ipratropium-albuterol  1 ampule Inhalation 4x daily     Continuous Infusions:   sodium chloride       PRN Meds:sodium chloride flush, sodium chloride, polyethylene glycol, acetaminophen **OR** acetaminophen, ondansetron, albuterol    Objective:      Physical Exam:  Vitals: BP (!) 106/55   Pulse 106   Temp 97 °F (36.1 °C) (Temporal)   Resp 20   Ht 5' (1.524 m)   Wt 112 lb 11.2 oz (51.1 kg)   SpO2 (!) 86%   BMI 22.01 kg/m²   24 hour intake/output:    Intake/Output Summary (Last 24 hours) at 6/14/2021 1024  Last data filed at 6/14/2021 0837  Gross per 24 hour   Intake 696 ml   Output --   Net 696 ml     Last 3 weights: Wt Readings from Last 3 Encounters:   06/14/21 112 lb 11.2 oz (51.1 kg)   06/01/21 110 lb (49.9 kg)   05/11/21 121 lb (54.9 kg)         Physical Examination:   PHYSICAL EXAMINATION:  Vitals:    06/14/21 0833 06/14/21 0835 06/14/21 0838 06/14/21 0900   BP:    (!) 106/55   Pulse:    106   Resp:    20   Temp:       TempSrc:       SpO2: (!) 79% (!) 80% (!) 88% (!) 86%   Weight:       Height:         As this was a video visit, patient could not be examined other than inspecting via the video. Patient is comfortable on the BiPAP machine and sleeping. Not using accessory musculature for breathing.          CBC:   Recent Labs     06/12/21  0510   WBC 7.0   HGB 11.2*   PLT See Reflexed IPF Result     BMP:    Recent Labs     06/12/21  0510 06/13/21  0515 06/14/21  0530   * 134* 131*   K 4.6 4.3 5.3   CL 87* 90* 87*   CO2 >45* 44* 42*   BUN 24* 20 26*   CREATININE 0.52 0.50 0.57   GLUCOSE 91 91 124*     Calcium:  Recent Labs 06/14/21  0530   CALCIUM 9.0     Ionized Calcium:No results for input(s): IONCA in the last 72 hours. Magnesium:No results for input(s): MG in the last 72 hours. Phosphorus:No results for input(s): PHOS in the last 72 hours. BNP:No results for input(s): BNP in the last 72 hours. Glucose:No results for input(s): POCGLU in the last 72 hours. HgbA1C: No results for input(s): LABA1C in the last 72 hours. INR: No results for input(s): INR in the last 72 hours. Hepatic: No results for input(s): ALKPHOS, ALT, AST, PROT, BILITOT, BILIDIR, LABALBU in the last 72 hours. Amylase and Lipase:No results for input(s): LACTA, AMYLASE in the last 72 hours. Lactic Acid: No results for input(s): LACTA in the last 72 hours. CARDIAC ENZYMES:No results for input(s): CKTOTAL, CKMB, CKMBINDEX, TROPONINI in the last 72 hours. BNP: No results for input(s): BNP in the last 72 hours. Lipids: No results for input(s): CHOL, TRIG, HDL, LDLCALC in the last 72 hours. Invalid input(s): LDL  ABGs: No results found for: PH, PCO2, PO2, HCO3, O2SAT  Thyroid:   Lab Results   Component Value Date    TSH 4.31 09/15/2020      Urinalysis: No results for input(s): BACTERIA, BLOODU, CLARITYU, COLORU, PHUR, PROTEINU, RBCUA, SPECGRAV, BILIRUBINUR, NITRU, WBCUA, LEUKOCYTESUR, GLUCOSEU in the last 72 hours. CULTURES:          Assessment:    Principal Problem:    Acute on chronic respiratory failure with hypoxia and hypercapnia (HCC)  Active Problems:    Pulmonary hypertension due to hypoxia (HCC) /  interstitial lung disease related    IPF (idiopathic pulmonary fibrosis) (HCC)    Severe malnutrition (HCC)    Respiratory acidosis from CO2 retention with Interstitial Lung Dz. Transient alteration of awareness    COPD exacerbation ([facility]  Resolved Problems:    * No resolved hospital problems. *  Patient having progression of the hypercarbia despite being on the BiPAP. No infectious process contributing to this problem.   The main contributor reasons for hypercarbia is interstitial lung disease, kyphoscoliosis along with some COPD and malnutrition    Plan:    Check serum TSH  BiPAP was adjusted with increasing IPAP to 22. This could be increased to 24 if needed. Also decrease the EPAP to 5 cm of water. Keep oxygen saturation 88 to 90%. Patient apparently informed the caregivers that she would not want to be intubated and go on a ventilator. If the changes were not to make any difference, hospice care would be appropriate  Meds reviewed- changes made as appropriate. Increase activity as permitted and tolerated. Questions That Ms. Garcia had were answered to her satisfaction. Continue supplemental oxygen  Patient was informed of the need for using oxygen. Patient was educated on the importance of compliance and the benefits of oxygen use. Complications of oxygen use, including dryness of the nostrils, epistaxis and also the fire hazard were explained to the patient. Patient willingly accepts to use  the oxygen as recommended  BiPAP data reviewed. Changes made as needed. Cxr reviewed. No new infiltrate  Diet reviewed. Thank you for having us involved in the care of your patient. Please call us if you have any questions or concerns.     Magdiel Pulido MD, MD      6/14/2021, 10:24 AM    Pulmonary & Critical Care

## 2021-06-14 NOTE — PROGRESS NOTES
Jean Carlos Houston NP at bedside doing a tele health visit with Dr Abelino Hall [facility] [address] Dr Abelino Hall requested to change Bipap settings to 22/5.
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
## 2021-06-14 NOTE — PROGRESS NOTES
[facility] called and they are able to meet with pt and family this evening. Pt and  and her daughters state they are ok with this and meeting will be tonight at [phone].

## 2021-06-14 NOTE — PROGRESS NOTES
BiPAP removed per patient request. Placed on 1L nasal cannula. Will place BiPAP back on once patient and family are finished meeting with [facility].

## 2021-06-14 NOTE — PROGRESS NOTES
Patient in chair, alert and oriented x4. On 1L O2 per nasal cannula. Vitals and assessment as charted. Patient requests to go back to bed. One assist from chair to bed. Repositioned in bed. BiPAP placed on patient at this time. Call light in reach. Bed alarm on.

## 2021-06-14 NOTE — PLAN OF CARE
Problem: Falls - Risk of:  Goal: Will remain free from falls  Description: Will remain free from falls  Outcome: Ongoing     Problem: Falls - Risk of:  Goal: Absence of physical injury  Description: Absence of physical injury  Outcome: Ongoing     Problem: Skin Integrity:  Goal: Will show no infection signs and symptoms  Description: Will show no infection signs and symptoms  Outcome: Ongoing     Problem: Skin Integrity:  Goal: Absence of new skin breakdown  Description: Absence of new skin breakdown  Outcome: Ongoing     Problem: Skin Integrity:  Goal: Demonstration of wound healing without infection will improve  Description: Demonstration of wound healing without infection will improve  Outcome: Ongoing     Problem: Skin Integrity:  Goal: Complications related to intravenous access or infusion will be avoided or minimized  Description: Complications related to intravenous access or infusion will be avoided or minimized  Outcome: Ongoing     Problem: Nutrition  Goal: Optimal nutrition therapy  Outcome: Ongoing     Problem: OXYGENATION/RESPIRATORY FUNCTION  Goal: Patient will maintain patent airway  Outcome: Ongoing     Problem: OXYGENATION/RESPIRATORY FUNCTION  Goal: Patient will achieve/maintain normal respiratory rate/effort  Description: Respiratory rate and effort will be within normal limits for the patient  Outcome: Ongoing     Problem: SKIN INTEGRITY  Goal: Skin integrity is maintained or improved  Outcome: Ongoing     Problem: NUTRITION  Goal: Nutritional status is improving  Outcome: Ongoing     Problem: Nutrition Deficit:  Goal: Ability to achieve adequate nutritional intake will improve  Description: Ability to achieve adequate nutritional intake will improve  Outcome: Ongoing     Problem: Nutritional:  Goal: Nutritional status will improve  Description: Nutritional status will improve  Outcome: Ongoing     Problem: Physical Regulation:  Goal: Diagnostic test results will improve  Description: Diagnostic test results will improve  Outcome: Ongoing     Problem: Physical Regulation:  Goal: Will remain free from infection  Description: Will remain free from infection  Outcome: Ongoing     Problem: Physical Regulation:  Goal: Ability to maintain vital signs within normal range will improve  Description: Ability to maintain vital signs within normal range will improve  Outcome: Ongoing     Problem: Respiratory:  Goal: Ability to maintain normal respiratory secretions will improve  Description: Ability to maintain normal respiratory secretions will improve  Outcome: Ongoing     Problem: Gas Exchange - Impaired:  Goal: Levels of oxygenation will improve  Description: Levels of oxygenation will improve  Outcome: Ongoing

## 2021-06-14 NOTE — PROGRESS NOTES
Alejo Viera from [facility] spoke with patient and family. Family wants to speak with patients two sons before making any decisions. Family is wanting patient to continue with PT and is thinking discharge home with home health services. [facility] hospice will follow up on Wednesday.

## 2021-06-14 NOTE — PROGRESS NOTES
Pt resting in bed, opens eyes to verbal stimulus. Pt able to answer questions appropriately. bipap mask readjusted. Will continue to monitor.

## 2021-06-14 NOTE — PROGRESS NOTES
Called outpt clinic, pulmonology not here today.  They will be here Thursday this week and Monday next wee, will update Kapil Broussard NP.

## 2021-06-14 NOTE — PROGRESS NOTES
Gave the patient and family the list of Hospice agencies to review. They will let the nurse know on which one they decided they would like to meet with. Will make the referral in the morning. They plan at this time is home with hospice care.     JESSICA Hartley

## 2021-06-14 NOTE — PROGRESS NOTES
ICU Progress Note    SUBJECTIVE/INTERVAL HISTORY:    Patient seen in follow up for respiratory failure. Patient resting in bed alert but drowsy with BiPAP on in no acute distress. Patient's PCO2 levels this morning were 132 with a pH of 7.227 and bicarb of 53.7 today. Patient has been struggling up and down with stabilization of her ABGs. Patient does not complain of any chest pain. She is afebrile. She is in no distress at this time and rest easy. OBJECTIVE:    Vitals:   Temp: 97 °F (36.1 °C)  Temp range:    Temp  Av.8 °F (36.6 °C)  Min: 97 °F (36.1 °C)  Max: 98.6 °F (37 °C)    BP: (!) 106/55  BP Range:      Systolic (84XQS), XAVIER:471 , Min:93 , MEP:488      Diastolic (79DJS), ZNN:64, Min:54, Max:87    Pulse: 106  Pulse Range:    Pulse  Av.6  Min: 96  Max: 119    Resp: 20  Resp Range:   Resp  Av.2  Min: 16  Max: 37    SpO2: (!) 86 % on supplemental O2  SpO2 range:   SpO2  Av %  Min: 72 %  Max: 95 %    24HR INTAKE/OUTPUT:      Intake/Output Summary (Last 24 hours) at 2021 0948  Last data filed at 2021 0837  Gross per 24 hour   Intake 696 ml   Output --   Net 696 ml       -----------------------------------------------------------------  Review of Systems:  Constitutional:negative  for fevers, and negative for chills.   Eyes: negative for visual disturbance   ENT: negative for sore throat, negative nasal congestion, and negative for earache  Respiratory: positive for shortness of breath, negative for cough, and negative for wheezing  Cardiovascular: negative for chest pain, negative for palpitations, and negative for syncope  Gastrointestinal: negative for abdominal pain, negative for nausea,negative for vomiting, negative for diarrhea, negative for constipation, and negative for hematochezia or melena  Genitourinary: negative for dysuria, negative for urinary urgency, negative for urinary frequency, and negative for hematuria  Skin: negative for skin rash, and negative for skin lesions  Neurological: negative for unilateral weakness, numbness or tingling. Exam:  GEN:  alert and oriented to person, place and time, alert and frail-appearing  EYES: No gross abnormalities. , PERRL and EOMI  NECK: normal, supple, no lymphadenopathy,  no carotid bruits  PULM: clear to auscultation bilaterally- no wheezes, rales or rhonchi, normal air movement, no respiratory distress and decreased breath sounds noted-throughout  COR: regular rate & rhythm, no murmurs, no gallops, S1 normal and S2 normal  ABD:  soft, non-tender, non-distended, normal bowel sounds, no masses or organomegaly  EXT:   no cyanosis, clubbing or edema present    NEURO: follows commands, BALDWIN, no deficits  SKIN:  no rashes or significant lesions      -----------------------------------------------------------------  Diagnostic Data:  Lab Results   Component Value Date    WBC 7.0 06/12/2021    HGB 11.2 (L) 06/12/2021    HCT 36.6 06/12/2021    PLT See Reflexed IPF Result 06/12/2021    HDL 68 09/15/2020    ALT 19 06/09/2021    AST 25 06/09/2021     (L) 06/14/2021    K 5.3 06/14/2021    CL 87 (L) 06/14/2021    CREATININE 0.57 06/14/2021    BUN 26 (H) 06/14/2021    CO2 42 (HH) 06/14/2021    TSH 4.31 09/15/2020    INR 1.0 08/27/2019    GLUF 91 09/15/2020       Results for Inocencio Leblanc (MRN [id number]) as of 6/14/2021 09:50   Ref.  Range 5/5/2021 12:10 6/10/2021 05:40 6/10/2021 10:42 6/10/2021 15:20 6/11/2021 06:17 6/12/2021 08:10 6/13/2021 05:33 6/14/2021 06:05   pH, Arterial Latest Ref Range: 7.35 - 7.45  7.234 (L) 7.367 7.155 (LL) 7.367 7.370 7.355 7.382 7.227 (L)   pH, Art, Temp Adj Latest Ref Range: 7.350 - 7.450  NOT REPORTED NOT REPORTED NOT REPORTED NOT REPORTED NOT REPORTED NOT REPORTED NOT REPORTED NOT REPORTED   pCO2, Art, Temp Adj Unknown NOT REPORTED NOT REPORTED NOT REPORTED NOT REPORTED NOT REPORTED NOT REPORTED NOT REPORTED NOT REPORTED   pCO2, Arterial Latest Ref Range: 35 - 45 mmHg 94.8 (HH) 95.4 (HH) 129.6 (HH) 97.4 (HH) 95.5 (HH) 94.4 (HH) 75.6 (HH) 132.0 (HH)   pO2, Art, Temp Adj Latest Ref Range: 80.0 - 100.0 mmHg NOT REPORTED NOT REPORTED NOT REPORTED NOT REPORTED NOT REPORTED NOT REPORTED NOT REPORTED NOT REPORTED   pO2, Arterial Latest Ref Range: 80 - 100 mmHg 127.1 (H) 194.6 (H) 445.7 (H) 84.3 70.3 (L) 89.5 79.2 (L) 92.3   HCO3, Arterial Latest Ref Range: 22 - 26 mmol/L 48.2 (H) 53.5 (H) 44.6 (H) 54.7 (H) 54.0 (H) 51.5 (H) 43.9 (H) 53.7 (H)   Positive Base Excess, Art Latest Ref Range: 0.0 - 2.0 mmol/L 16.7 (H) 21.9 (H) 10.0 (H) 22.8 (H) 22.4 (H) 20.1 (H) 14.7 (H) 18.8 (H)   Negative Base Excess, Art Latest Ref Range: 0.0 - 2.0 mmol/L NOT REPORTED NOT REPORTED NOT REPORTED NOT REPORTED NOT REPORTED NOT REPORTED NOT REPORTED NOT REPORTED   O2 Sat, Arterial Latest Ref Range: 95 - 98 % 98.1 (H) 99.2 (H) 99.7 (H) 95.2 92.4 (L) 95.9 95.0 94.5 (L)       ASSESSMENT:    Principal Problem:    Acute on chronic respiratory failure with hypoxia and hypercapnia (HCC)  Active Problems:    Pulmonary hypertension due to hypoxia (HCC) /  interstitial lung disease related    IPF (idiopathic pulmonary fibrosis) (HCC)    Severe malnutrition (HCC)    Respiratory acidosis from CO2 retention with Interstitial Lung Dz. Transient alteration of awareness    COPD exacerbation ([facility]  Resolved Problems:    * No resolved hospital problems.  *      Patient Active Problem List    Diagnosis Date Noted    Respiratory acidosis from CO2 retention with Interstitial Lung Dz. 06/09/2021    Transient alteration of awareness 06/09/2021    COPD exacerbation ([facility] 06/09/2021    Major depressive disorder with single episode, in full remission ([facility] 01/22/2021    Severe malnutrition ([facility] 08/28/2019    BAYRON (obstructive sleep apnea) 11/02/2017    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 11/02/2017    Chronic cor pulmonale ([facility] 11/02/2017    Pulmonary hypertension due to hypoxia [facility]) /  interstitial lung disease related 11/02/2017    IPF (idiopathic pulmonary fibrosis) ([facility] 11/02/2017    Acute on chronic respiratory failure with hypoxia and hypercapnia (HCC) 08/11/2017    Chronic interstitial lung disease ([facility] 06/23/2017    Kyphoscoliosis 05/01/2017    Hypothyroidism        PLAN:  Principal Problem:    Acute on chronic respiratory failure with hypoxia and hypercapnia (HCC)  Active Problems:    Pulmonary hypertension due to hypoxia (HCC) /  interstitial lung disease related    IPF (idiopathic pulmonary fibrosis) (HCC)    Severe malnutrition (HCC)    Respiratory acidosis from CO2 retention with Interstitial Lung Dz. Transient alteration of awareness    COPD exacerbation ([facility]  Resolved Problems:    * No resolved hospital problems. *    · Acute on chronic respiratory failure withy hypercapnia, respiratory acidosis with metabolic encephalopathy  ? Appreciate pulmonology  ? BiPAP  ? Wean supplemental O2 to keep SpO2 around 88%  ? Trend ABG's-worsening today  ? Cefepime for bronchiectasis per Dr. Thea Conley  ? Repeat chest x-ray today-no change from previous  ? I will reach out to pulmonology today to discuss possibility of either trilogy or hospice per their recommendation. · Pulmonary fibrosis  ? Continue Plaquenil  · BAYRON on CPAP  ? Continue BiPAP  · DVT prophylaxis: Lovenox   · Nutrition status: malnutrition  ?  Dietician consult initiated  · High risk medications: none   · Discharge plan: Pending    MANJINDER Boland CNP, MANJINDER, NP-C  6/14/2021, 9:48 AM

## 2021-06-14 NOTE — PROGRESS NOTES
Quad: x10  Ankle Pumps: 15x  Comments: Above listed ex completed in supine position. Pt able to completed most exercises by self but did require active assist towards end. G-Code     OutComes Score    AM-PAC Score    Goals  Short term goals  Time Frame for Short term goals: 20 days  Short term goal 1: Pt will be educated on her POC  Short term goal 2: Pt will perform all transfers Mod I inorder toincrease independence  Short term goal 3: Pt will ambulate 100 feet with LRAD Mod I in order to return to PLOF  Short term goal 4: Pt will increase dynamic standing balance to Good in order to reduce fall risk  Patient Goals   Patient goals : \"to get better and go home\"    Plan    Plan  Times per week: 7x/wk  Times per day: Twice a day  Plan weeks: 2x/daily except weekends 1x/daily  Current Treatment Recommendations: Strengthening, Neuromuscular Re-education, Home Exercise Program, Manual Therapy - Soft Tissue Mobilization, Safety Education & Training, Balance Training, Endurance Training, Patient/Caregiver Education & Training, Functional Mobility Training, Transfer Training, Gait Training  Safety Devices  Type of devices:  All fall risk precautions in place, Call light within reach, Left in bed, Nurse notified     Therapy Time   Individual Concurrent Group Co-treatment   Time In 0843         Time Out 0902         Minutes [address]

## 2021-06-15 LAB
ALLEN TEST: ABNORMAL
ANION GAP SERPL CALCULATED.3IONS-SCNC: ABNORMAL MMOL/L (ref 9–17)
BUN BLDV-MCNC: 24 MG/DL (ref 8–23)
BUN/CREAT BLD: 46 (ref 9–20)
CALCIUM SERPL-MCNC: 9.1 MG/DL (ref 8.6–10.4)
CARBOXYHEMOGLOBIN: ABNORMAL % (ref 0–5)
CHLORIDE BLD-SCNC: 84 MMOL/L (ref 98–107)
CO2: >45 MMOL/L (ref 20–31)
CREAT SERPL-MCNC: 0.52 MG/DL (ref 0.5–0.9)
FIO2: 26
GFR AFRICAN AMERICAN: >60 ML/MIN
GFR NON-AFRICAN AMERICAN: >60 ML/MIN
GFR SERPL CREATININE-BSD FRML MDRD: ABNORMAL ML/MIN/{1.73_M2}
GFR SERPL CREATININE-BSD FRML MDRD: ABNORMAL ML/MIN/{1.73_M2}
GLUCOSE BLD-MCNC: 83 MG/DL (ref 70–99)
HCO3 ARTERIAL: 49.2 MMOL/L (ref 22–26)
METHEMOGLOBIN: ABNORMAL % (ref 0–1.9)
MODE: ABNORMAL
NEGATIVE BASE EXCESS, ART: ABNORMAL MMOL/L (ref 0–2)
NOTIFICATION TIME: ABNORMAL
NOTIFICATION: ABNORMAL
O2 DEVICE/FLOW/%: ABNORMAL
O2 SAT, ARTERIAL: 91.6 % (ref 95–98)
OXYHEMOGLOBIN: ABNORMAL % (ref 95–98)
PATIENT TEMP: 37
PCO2 ARTERIAL: 75.5 MMHG (ref 35–45)
PCO2, ART, TEMP ADJ: ABNORMAL (ref 35–45)
PEEP/CPAP: ABNORMAL
PH ARTERIAL: 7.43 (ref 7.35–7.45)
PH, ART, TEMP ADJ: ABNORMAL (ref 7.35–7.45)
PO2 ARTERIAL: 62.9 MMHG (ref 80–100)
PO2, ART, TEMP ADJ: ABNORMAL MMHG (ref 80–100)
POSITIVE BASE EXCESS, ART: 20 MMOL/L (ref 0–2)
POTASSIUM SERPL-SCNC: 4.5 MMOL/L (ref 3.7–5.3)
PSV: ABNORMAL
PT. POSITION: ABNORMAL
RESPIRATORY RATE: 21
SAMPLE SITE: ABNORMAL
SET RATE: ABNORMAL
SODIUM BLD-SCNC: 130 MMOL/L (ref 135–144)
TEXT FOR RESPIRATORY: ABNORMAL
TOTAL HB: ABNORMAL G/DL (ref 12–16)
TOTAL RATE: ABNORMAL
VT: ABNORMAL

## 2021-06-15 PROCEDURE — 97530 THERAPEUTIC ACTIVITIES: CPT

## 2021-06-15 PROCEDURE — 94640 AIRWAY INHALATION TREATMENT: CPT

## 2021-06-15 PROCEDURE — 80048 BASIC METABOLIC PNL TOTAL CA: CPT

## 2021-06-15 PROCEDURE — 94660 CPAP INITIATION&MGMT: CPT

## 2021-06-15 PROCEDURE — 6360000002 HC RX W HCPCS: Performed by: INTERNAL MEDICINE

## 2021-06-15 PROCEDURE — 94664 DEMO&/EVAL PT USE INHALER: CPT

## 2021-06-15 PROCEDURE — 97110 THERAPEUTIC EXERCISES: CPT

## 2021-06-15 PROCEDURE — 36415 COLL VENOUS BLD VENIPUNCTURE: CPT

## 2021-06-15 PROCEDURE — 2000000000 HC ICU R&B

## 2021-06-15 PROCEDURE — 6370000000 HC RX 637 (ALT 250 FOR IP): Performed by: NURSE PRACTITIONER

## 2021-06-15 PROCEDURE — 2580000003 HC RX 258: Performed by: NURSE PRACTITIONER

## 2021-06-15 PROCEDURE — 97116 GAIT TRAINING THERAPY: CPT

## 2021-06-15 PROCEDURE — 2700000000 HC OXYGEN THERAPY PER DAY

## 2021-06-15 PROCEDURE — 82805 BLOOD GASES W/O2 SATURATION: CPT

## 2021-06-15 PROCEDURE — 97535 SELF CARE MNGMENT TRAINING: CPT

## 2021-06-15 PROCEDURE — 6370000000 HC RX 637 (ALT 250 FOR IP): Performed by: INTERNAL MEDICINE

## 2021-06-15 PROCEDURE — 6360000002 HC RX W HCPCS: Performed by: NURSE PRACTITIONER

## 2021-06-15 PROCEDURE — 94761 N-INVAS EAR/PLS OXIMETRY MLT: CPT

## 2021-06-15 PROCEDURE — 36600 WITHDRAWAL OF ARTERIAL BLOOD: CPT

## 2021-06-15 PROCEDURE — 2580000003 HC RX 258: Performed by: INTERNAL MEDICINE

## 2021-06-15 RX ADMIN — ENOXAPARIN SODIUM 40 MG: 40 INJECTION SUBCUTANEOUS at 08:12

## 2021-06-15 RX ADMIN — PANTOPRAZOLE SODIUM 40 MG: 40 TABLET, DELAYED RELEASE ORAL at 07:03

## 2021-06-15 RX ADMIN — CEFEPIME HYDROCHLORIDE 2000 MG: 2 INJECTION, POWDER, FOR SOLUTION INTRAVENOUS at 19:00

## 2021-06-15 RX ADMIN — SODIUM CHLORIDE, PRESERVATIVE FREE 10 ML: 5 INJECTION INTRAVENOUS at 21:08

## 2021-06-15 RX ADMIN — LEVOTHYROXINE SODIUM 175 MCG: 150 TABLET ORAL at 07:03

## 2021-06-15 RX ADMIN — SERTRALINE HYDROCHLORIDE 50 MG: 50 TABLET ORAL at 08:12

## 2021-06-15 RX ADMIN — IPRATROPIUM BROMIDE AND ALBUTEROL SULFATE 1 AMPULE: .5; 3 SOLUTION RESPIRATORY (INHALATION) at 19:27

## 2021-06-15 RX ADMIN — POTASSIUM CHLORIDE 20 MEQ: 1500 TABLET, EXTENDED RELEASE ORAL at 08:12

## 2021-06-15 RX ADMIN — PREDNISONE 40 MG: 20 TABLET ORAL at 08:12

## 2021-06-15 RX ADMIN — IPRATROPIUM BROMIDE AND ALBUTEROL SULFATE 1 AMPULE: .5; 3 SOLUTION RESPIRATORY (INHALATION) at 15:13

## 2021-06-15 RX ADMIN — CEFEPIME HYDROCHLORIDE 2000 MG: 2 INJECTION, POWDER, FOR SOLUTION INTRAVENOUS at 07:02

## 2021-06-15 RX ADMIN — FUROSEMIDE 20 MG: 20 TABLET ORAL at 08:12

## 2021-06-15 RX ADMIN — HYDROXYCHLOROQUINE SULFATE 200 MG: 200 TABLET ORAL at 08:12

## 2021-06-15 RX ADMIN — SODIUM CHLORIDE, PRESERVATIVE FREE 10 ML: 5 INJECTION INTRAVENOUS at 07:03

## 2021-06-15 RX ADMIN — SPIRONOLACTONE 25 MG: 25 TABLET, FILM COATED ORAL at 08:12

## 2021-06-15 RX ADMIN — IPRATROPIUM BROMIDE AND ALBUTEROL SULFATE 1 AMPULE: .5; 3 SOLUTION RESPIRATORY (INHALATION) at 05:24

## 2021-06-15 ASSESSMENT — PAIN SCALES - GENERAL
PAINLEVEL_OUTOF10: 0
PAINLEVEL_OUTOF10: 0

## 2021-06-15 NOTE — PROGRESS NOTES
ICU Progress Note    SUBJECTIVE/INTERVAL HISTORY:    Patient seen in follow up for respiratory failure. Patient sitting up in the chair eating breakfast alert and oriented in no acute distress. Patient currently on nasal cannula and tolerating well. Her blood gases are improved today. She has no complaints at this time. She and her family are discussing hospice at this time. They did meet with Brooke Silver last night. She does have further family coming in and wants to continue to have those conversations for her plan of discharge. OBJECTIVE:    Vitals:   Temp: 97.3 °F (36.3 °C)  Temp range:    Temp  Av.9 °F (36.6 °C)  Min: 97 °F (36.1 °C)  Max: 98.9 °F (37.2 °C)    BP: 119/62  BP Range:      Systolic (10IUH), ZQM:615 , Min:86 , JHL:817      Diastolic (02DXO), PGL:49, Min:46, Max:123    Pulse: 95  Pulse Range:    Pulse  Av.8  Min: 77  Max: 111    Resp: 21  Resp Range:   Resp  Av.4  Min: 20  Max: 28    SpO2: 94 % on supplemental O2  SpO2 range:   SpO2  Av %  Min: 81 %  Max: 96 %    24HR INTAKE/OUTPUT:      Intake/Output Summary (Last 24 hours) at 6/15/2021 1053  Last data filed at 6/15/2021 0800  Gross per 24 hour   Intake 637 ml   Output 2100 ml   Net -1463 ml       -----------------------------------------------------------------  Review of Systems:  Constitutional:negative  for fevers, and negative for chills.   Eyes: negative for visual disturbance   ENT: negative for sore throat, negative nasal congestion, and negative for earache  Respiratory: positive for shortness of breath, negative for cough, and negative for wheezing  Cardiovascular: negative for chest pain, negative for palpitations, and negative for syncope  Gastrointestinal: negative for abdominal pain, negative for nausea,negative for vomiting, negative for diarrhea, negative for constipation, and negative for hematochezia or melena  Genitourinary: negative for dysuria, negative for urinary urgency, negative for urinary frequency, and negative for hematuria  Skin: negative for skin rash, and negative for skin lesions  Neurological: negative for unilateral weakness, numbness or tingling. Exam:  GEN:  alert and oriented to person, place and time, alert and frail-appearing  EYES: No gross abnormalities. , PERRL and EOMI  NECK: normal, supple, no lymphadenopathy,  no carotid bruits  PULM: clear to auscultation bilaterally- no wheezes, rales or rhonchi, normal air movement, no respiratory distress  COR: regular rate & rhythm, no murmurs, no gallops, S1 normal and S2 normal  ABD:  soft, non-tender, non-distended, normal bowel sounds, no masses or organomegaly  EXT:   no cyanosis, clubbing or edema present    NEURO: follows commands, BALDWIN, no deficits  SKIN:  no rashes or significant lesions      -----------------------------------------------------------------  Diagnostic Data:  Lab Results   Component Value Date    WBC 7.0 06/12/2021    HGB 11.2 (L) 06/12/2021    HCT 36.6 06/12/2021    PLT See Reflexed IPF Result 06/12/2021    HDL 68 09/15/2020    ALT 19 06/09/2021    AST 25 06/09/2021     (L) 06/15/2021    K 4.5 06/15/2021    CL 84 (LL) 06/15/2021    CREATININE 0.52 06/15/2021    BUN 24 (H) 06/15/2021    CO2 >45 (HH) 06/15/2021    TSH 3.32 06/14/2021    INR 1.0 08/27/2019    GLUF 91 09/15/2020     ASSESSMENT:    Principal Problem:    Acute on chronic respiratory failure with hypoxia and hypercapnia (HCC)  Active Problems:    Pulmonary hypertension due to hypoxia (HCC) /  interstitial lung disease related    IPF (idiopathic pulmonary fibrosis) (HCC)    Severe malnutrition (HCC)    Respiratory acidosis from CO2 retention with Interstitial Lung Dz. Transient alteration of awareness    COPD exacerbation ([address]  Resolved Problems:    * No resolved hospital problems.  *      Patient Active Problem List    Diagnosis Date Noted    Respiratory acidosis from CO2 retention with Interstitial Lung Dz. 06/09/2021    Transient alteration of awareness CPAP  ? Continue BiPAP  · DVT prophylaxis: Lovenox   · Nutrition status: malnutrition  ?  Dietician consult initiated  · High risk medications: none   · Discharge plan: Possible with hospice    MANJINDER Fletcher - CNP, APRN, NP-C  6/15/2021, 10:53 AM

## 2021-06-15 NOTE — PLAN OF CARE
Problem: Falls - Risk of:  Goal: Will remain free from falls  Description: Will remain free from falls  6/14/2021 2321 by Natasha Reynoso RN  Outcome: Met This Shift  Note: Fall precautions in place. Side rails up x2. Call light in reach. Bed alarm on. Problem: Skin Integrity:  Goal: Absence of new skin breakdown  Description: Absence of new skin breakdown  6/14/2021 2321 by Natasha Reynoso RN  Outcome: Ongoing  Note: Intact redness on bridge of nose from BiPAP machine. Skin barrier in place. Redness on coccyx as well. Turning every 2 hours and zinc paste applied. 6/14/2021 1238 by Aman Funes RN  Outcome: Ongoing     Problem: Nutrition  Goal: Optimal nutrition therapy  6/14/2021 2321 by Natasha Reynoso RN  Outcome: Ongoing  Note: Encouraging intake of meals and oral supplements. 6/14/2021 1238 by Aman Funes RN  Outcome: Ongoing     Problem: OXYGENATION/RESPIRATORY FUNCTION  Goal: Patient will achieve/maintain normal respiratory rate/effort  Description: Respiratory rate and effort will be within normal limits for the patient  6/14/2021 2321 by Natasha Reynoso RN  Outcome: Ongoing  Note: Patient on continuous BiPAP. Goal is to maintain oxygen saturation at 88%. Problem: Physical Regulation:  Goal: Diagnostic test results will improve  Description: Diagnostic test results will improve  6/14/2021 2321 by Natasha Reynoso RN  Outcome: Ongoing  Note: Monitoring daily labs.    6/14/2021 1238 by Aman Funes RN  Outcome: Ongoing     Problem: Gas Exchange - Impaired:  Goal: Levels of oxygenation will improve  Description: Levels of oxygenation will improve  6/14/2021 2321 by Natasha Reynoso RN  Outcome: Ongoing  6/14/2021 1238 by Aman Funes RN  Outcome: Ongoing     Problem: Coping:  Goal: Ability to remain calm will improve  Description: Ability to remain calm will improve  Outcome: Met This Shift
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
## 2021-06-15 NOTE — PROGRESS NOTES
Writer with patient to give breathing medication at this time, but patient wanted to eat lunch first.   At this time I changed the patients trilogy mask from a medium to large at this time.

## 2021-06-15 NOTE — PLAN OF CARE
Problem: Falls - Risk of:  Goal: Will remain free from falls  Description: Will remain free from falls  6/15/2021 0724 by Destinee Rodriguez RN  Outcome: Ongoing  Note: Fall assessment completed daily. Bed in lowest position. Call light within reach. Falling star posted to outside of door. Fall risk sticker in place on ID band. Side rails up 2/4. Bed alarm on for safety. Patient ambulates fairly well with staff assistance. Will continue to monitor. 6/14/2021 2321 by Margret Owens RN  Outcome: Met This Shift  Note: Fall precautions in place. Side rails up x2. Call light in reach. Bed alarm on. Goal: Absence of physical injury  Description: Absence of physical injury  Outcome: Ongoing     Problem: Skin Integrity:  Goal: Will show no infection signs and symptoms  Description: Will show no infection signs and symptoms  Outcome: Ongoing  Note: Skin assessment performed, no breakdown noted at this time. Some redness noted to buttocks. Zinc applied. Patient skin is dry and intact. Will continue to monitor for redness or breakdown. Goal: Absence of new skin breakdown  Description: Absence of new skin breakdown  6/15/2021 0724 by Destinee Rodriguez RN  Outcome: Ongoing  6/14/2021 2321 by Margret Owens RN  Outcome: Ongoing  Note: Intact redness on bridge of nose from BiPAP machine. Skin barrier in place. Redness on coccyx as well. Turning every 2 hours and zinc paste applied.    Goal: Demonstration of wound healing without infection will improve  Description: Demonstration of wound healing without infection will improve  Outcome: Ongoing  Goal: Complications related to intravenous access or infusion will be avoided or minimized  Description: Complications related to intravenous access or infusion will be avoided or minimized  Outcome: Ongoing     Problem: Nutrition  Goal: Optimal nutrition therapy  6/15/2021 0724 by Destinee Rodriguez RN  Outcome: Ongoing  6/14/2021 2321 by Margret Owens RN  Outcome: Ongoing  Note: Encouraging intake of meals and oral supplements. Problem: OXYGENATION/RESPIRATORY FUNCTION  Goal: Patient will maintain patent airway  Outcome: Ongoing  Goal: Patient will achieve/maintain normal respiratory rate/effort  Description: Respiratory rate and effort will be within normal limits for the patient  6/15/2021 0724 by Eris Valdez RN  Outcome: Ongoing  Note: Patient remains on BiPAP at this time. Settings regulated by pulmonologist.   6/14/2021 2321 by Natasha Reynoso RN  Outcome: Ongoing  Note: Patient on continuous BiPAP. Goal is to maintain oxygen saturation at 88%. Problem: SKIN INTEGRITY  Goal: Skin integrity is maintained or improved  Outcome: Ongoing     Problem: NUTRITION  Goal: Nutritional status is improving  Outcome: Ongoing     Problem: Nutrition Deficit:  Goal: Ability to achieve adequate nutritional intake will improve  Description: Ability to achieve adequate nutritional intake will improve  Outcome: Ongoing     Problem: Nutritional:  Goal: Nutritional status will improve  Description: Nutritional status will improve  Outcome: Ongoing     Problem: Physical Regulation:  Goal: Diagnostic test results will improve  Description: Diagnostic test results will improve  6/15/2021 0724 by Eris Valdez RN  Outcome: Ongoing  6/14/2021 2321 by Natasha Reynoso RN  Outcome: Ongoing  Note: Monitoring daily labs.    Goal: Will remain free from infection  Description: Will remain free from infection  Outcome: Ongoing  Goal: Ability to maintain vital signs within normal range will improve  Description: Ability to maintain vital signs within normal range will improve  Outcome: Ongoing     Problem: Respiratory:  Goal: Ability to maintain normal respiratory secretions will improve  Description: Ability to maintain normal respiratory secretions will improve  Outcome: Ongoing     Problem: Gas Exchange - Impaired:  Goal: Levels of oxygenation will improve  Description: Levels of oxygenation will improve  6/15/2021 0724 by Jet Shaw RN  Outcome: Ongoing  6/14/2021 2321 by Vincenzo Ledesma RN  Outcome: Ongoing     Problem: Coping:  Goal: Ability to remain calm will improve  Description: Ability to remain calm will improve  6/15/2021 0724 by Jet Shaw RN  Outcome: Ongoing  6/14/2021 2321 by Vincenzo Ledesma RN  Outcome: Met This Shift     Problem: Safety:  Goal: Ability to remain free from injury will improve  Description: Ability to remain free from injury will improve  Outcome: Ongoing  Note: ID band correct and in place. Bed locked and in lowest position. Call light within reach. Patient free from injury. Will continue to monitor.         Problem: Self-Care:  Goal: Ability to participate in self-care as condition permits will improve  Description: Ability to participate in self-care as condition permits will improve  Outcome: Ongoing

## 2021-06-15 NOTE — PROGRESS NOTES
This RN and respiratory in with patient. BiPAP removed and patients home Trilogy applied at this time. Mask does not fit properly. Patients  states that the machine came with three different sized masks and he will bring the other masks tomorrow. BiPAP re applied.
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
## 2021-06-15 NOTE — CONSULTS
Palliative Care Notes    Reason for Consult:  Chronic disease, Goals of care    Patient Active Problem List   Diagnosis    Hypothyroidism    Kyphoscoliosis    Chronic interstitial [facility] disease [facility]    Acute on chronic respiratory failure with hypoxia and hypercapnia (HCC)    BAYRON (obstructive sleep apnea)    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    Chronic cor pulmonale (HCC)    Pulmonary hypertension due to hypoxia ([facility] /  interstitial lung disease related    IPF (idiopathic pulmonary fibrosis) (HCC)    Severe malnutrition ([facility]    Major depressive disorder with single episode, [facility] full [facility]    Respiratory acidosis from CO2 retention with Interstitial Lung Dz.    Transient alteration of awareness    COPD exacerbation ([facility]       Advance Directives:  Code status: DNR-CCA  Patient has capacity for medical decisions: yes  Health Care Power of : yes  Living Will: yes        Pain Management:  The patient is not having any pain. Symptom Management:  Are there any other symptoms that are distressing to the patient or family that needs addressed? Anxiety:  none                          Dyspnea:  chronic dyspnea and Trilogy                          Fatigue:  exercise intolerance                          Bladder function:  Denies Issues                          Bowel function:  poor appetite    Other:  None     Spiritual history/needs:   notified: n/a    Palliative Performance Scale:  ___70%  Ambulation reduced; Some disease; Can't do normal job or work; intake normal or reduced; can do full self care; LOC full  _x__60%  Ambulation reduced; Significant disease; Can't do hobbies/housework; intake normal or reduced; occasional assist; LOC full/confusion  ___50%  Mainly sit/lie;  Extensive disease; Can't do any work; Considerable assist; intake normal or reduced; LOC full/confusion  ___40%  Mainly in bed; Extensive disease; Mainly assist; intake normal or reduced; LOC full/confusion   ___30%  Bed Bound; Extensive disease; Total care; intake reduced; LOC full/confusion  ___20%  Bed Bound; Extensive disease; Total care; intake minimal; Drowsy/coma  ___10%  Bed Bound; Extensive disease; Total care; Mouth care only; Drowsy/coma  ___0       Death    Readmission Risk:  12%    Notes:   Lalito Tobar is resting in bed for our discussion. She presented to the emergency room with confusion. She has a history of severe pulmonary fibrosis and was admitted with respiratory failure. Her , Stephanie Klein and her son are at the bedside as well. Lalito Tobar lives at home with her . She has a wheelchair, cane, shower chair and grab bars in the bathroom. She also has home oxygen and a Trilogy. She requires assist with getting into and out of the tub but otherwise is able to complete ADL's independently. Stephanie Klein drives an completes house hold chores. Lalito Tobar manages her own medications. Lalito Tobar states that she is SOB at home and has to rest for a few minutes when she is walking in the home. She has 4 children, one son lives out of state but they are all supportive. She also talks about her great grandchildren. Alvino Mcfarland met with [facility] yesterday. They plan to have a family meeting this evening to discuss and come to a decision. Stephanie Klein states that he is concerned about not having someone with him at night. Discussed hospice 24 hour assistance via phone, home health and nursing home options. Lalito Tobar states that she does not care if she has hospice or not \"I just want to go home. \" Stephanie Klein states that his big concern about taking her home is getting her Trilogy mask to fit. \"We have been through the company and have all three mask sizes and just cant get it to fit right. \" Support provided. Offered to discuss this with hospice to see if their staff are familiar with the device and fitting it. Stephanie Klein voices his appreciation.      Lalito Tobar has a DPOAHC on file dated 7/12/2017 with her  and daughter, Vinicius Hughes listed as decision makers. States that this remains active and up to date. She is currently a DNR-CCA and wishes to remain so. Discussed Emre's concerns about the Trilogy machine with hospice. Spoke with Juvencio Gibbs who states that if the staff are not familiar with a piece of equipment that they will have an in service with the provider so that they are comfortable. Discussed with Beverley Lane and Fortunato Dill. Beverley Lane states that he is relieved that they will be knowledgeable about the device. Both deny further questions at this time. Will continue to follow and support.      Palliative Care Plan:  Education/support to family  Education/support to patient  Discharge planning/helping to coordinate care  Providing support for coping/adaptation/distress of family  Providing support for coping/adaptation/distress of patient  Caregiver support/education  Continue with current plan of care  Validating patient/family distress  Recognizing, reflecting, and empathizing with family members' anticipatory grief  Recognizing, reflecting, and empathizing with the patient's anticipatory grief  Palliative Care Goals:  improve or maintain function/quality of life, provide comfort care/support/palliation/relieve suffering, remain at home, strengthening relationships, preserve independence/autonomy/control and support for family/caregiver  Visit focus:  Routine meeting  Functional decline  Discuss goals of care  Listen to patient/family concerns  Assess family understanding, concerns, and coping  Discuss chronic critical illness  Discuss discharge planning  Interdiscplinary collaboration  Address patient/family distress  Build trust  Provide emotional support to the family  Elicit patient's goals and values, and use these to establish or modify goals of care     Millie Darnell Nurse Coordinator  6/15/2021 11:10 AM

## 2021-06-16 LAB
ALLEN TEST: ABNORMAL
ANION GAP SERPL CALCULATED.3IONS-SCNC: ABNORMAL MMOL/L (ref 9–17)
BUN BLDV-MCNC: 24 MG/DL (ref 8–23)
BUN/CREAT BLD: 38 (ref 9–20)
CALCIUM SERPL-MCNC: 9.5 MG/DL (ref 8.6–10.4)
CARBOXYHEMOGLOBIN: ABNORMAL % (ref 0–5)
CHLORIDE BLD-SCNC: 82 MMOL/L (ref 98–107)
CO2: >45 MMOL/L (ref 20–31)
CREAT SERPL-MCNC: 0.63 MG/DL (ref 0.5–0.9)
FIO2: ABNORMAL
GFR AFRICAN AMERICAN: >60 ML/MIN
GFR NON-AFRICAN AMERICAN: >60 ML/MIN
GFR SERPL CREATININE-BSD FRML MDRD: ABNORMAL ML/MIN/{1.73_M2}
GFR SERPL CREATININE-BSD FRML MDRD: ABNORMAL ML/MIN/{1.73_M2}
GLUCOSE BLD-MCNC: 95 MG/DL (ref 70–99)
HCO3 ARTERIAL: 49.3 MMOL/L (ref 22–26)
METHEMOGLOBIN: ABNORMAL % (ref 0–1.9)
MODE: ABNORMAL
NEGATIVE BASE EXCESS, ART: ABNORMAL MMOL/L (ref 0–2)
NOTIFICATION TIME: ABNORMAL
NOTIFICATION: ABNORMAL
O2 DEVICE/FLOW/%: ABNORMAL
O2 SAT, ARTERIAL: 98.9 % (ref 95–98)
OXYHEMOGLOBIN: ABNORMAL % (ref 95–98)
PATIENT TEMP: 37
PCO2 ARTERIAL: 75.1 MMHG (ref 35–45)
PCO2, ART, TEMP ADJ: ABNORMAL
PEEP/CPAP: ABNORMAL
PH ARTERIAL: 7.43 (ref 7.35–7.45)
PH, ART, TEMP ADJ: ABNORMAL (ref 7.35–7.45)
PO2 ARTERIAL: 150.1 MMHG (ref 80–100)
PO2, ART, TEMP ADJ: ABNORMAL MMHG (ref 80–100)
POSITIVE BASE EXCESS, ART: 20.2 MMOL/L (ref 0–2)
POTASSIUM SERPL-SCNC: 4.8 MMOL/L (ref 3.7–5.3)
PSV: ABNORMAL
PT. POSITION: ABNORMAL
RESPIRATORY RATE: 17
SAMPLE SITE: ABNORMAL
SET RATE: ABNORMAL
SODIUM BLD-SCNC: 129 MMOL/L (ref 135–144)
TEXT FOR RESPIRATORY: ABNORMAL
TOTAL HB: ABNORMAL G/DL (ref 12–16)
TOTAL RATE: ABNORMAL
VT: ABNORMAL

## 2021-06-16 PROCEDURE — 94640 AIRWAY INHALATION TREATMENT: CPT

## 2021-06-16 PROCEDURE — 94664 DEMO&/EVAL PT USE INHALER: CPT

## 2021-06-16 PROCEDURE — 94761 N-INVAS EAR/PLS OXIMETRY MLT: CPT

## 2021-06-16 PROCEDURE — 36415 COLL VENOUS BLD VENIPUNCTURE: CPT

## 2021-06-16 PROCEDURE — 6360000002 HC RX W HCPCS: Performed by: INTERNAL MEDICINE

## 2021-06-16 PROCEDURE — 82805 BLOOD GASES W/O2 SATURATION: CPT

## 2021-06-16 PROCEDURE — 97110 THERAPEUTIC EXERCISES: CPT

## 2021-06-16 PROCEDURE — 6370000000 HC RX 637 (ALT 250 FOR IP): Performed by: INTERNAL MEDICINE

## 2021-06-16 PROCEDURE — 6360000002 HC RX W HCPCS: Performed by: NURSE PRACTITIONER

## 2021-06-16 PROCEDURE — 36600 WITHDRAWAL OF ARTERIAL BLOOD: CPT

## 2021-06-16 PROCEDURE — 2580000003 HC RX 258: Performed by: INTERNAL MEDICINE

## 2021-06-16 PROCEDURE — 6370000000 HC RX 637 (ALT 250 FOR IP): Performed by: NURSE PRACTITIONER

## 2021-06-16 PROCEDURE — 80048 BASIC METABOLIC PNL TOTAL CA: CPT

## 2021-06-16 PROCEDURE — 2580000003 HC RX 258: Performed by: NURSE PRACTITIONER

## 2021-06-16 PROCEDURE — 2700000000 HC OXYGEN THERAPY PER DAY

## 2021-06-16 PROCEDURE — 94669 MECHANICAL CHEST WALL OSCILL: CPT

## 2021-06-16 PROCEDURE — 97530 THERAPEUTIC ACTIVITIES: CPT

## 2021-06-16 PROCEDURE — 97116 GAIT TRAINING THERAPY: CPT

## 2021-06-16 PROCEDURE — 1200000000 HC SEMI PRIVATE

## 2021-06-16 RX ADMIN — IPRATROPIUM BROMIDE AND ALBUTEROL SULFATE 1 AMPULE: .5; 3 SOLUTION RESPIRATORY (INHALATION) at 16:24

## 2021-06-16 RX ADMIN — SERTRALINE HYDROCHLORIDE 50 MG: 50 TABLET ORAL at 08:01

## 2021-06-16 RX ADMIN — PANTOPRAZOLE SODIUM 40 MG: 40 TABLET, DELAYED RELEASE ORAL at 06:47

## 2021-06-16 RX ADMIN — SPIRONOLACTONE 25 MG: 25 TABLET, FILM COATED ORAL at 08:01

## 2021-06-16 RX ADMIN — IPRATROPIUM BROMIDE AND ALBUTEROL SULFATE 1 AMPULE: .5; 3 SOLUTION RESPIRATORY (INHALATION) at 10:50

## 2021-06-16 RX ADMIN — POTASSIUM CHLORIDE 20 MEQ: 1500 TABLET, EXTENDED RELEASE ORAL at 08:01

## 2021-06-16 RX ADMIN — IPRATROPIUM BROMIDE AND ALBUTEROL SULFATE 1 AMPULE: .5; 3 SOLUTION RESPIRATORY (INHALATION) at 19:46

## 2021-06-16 RX ADMIN — IPRATROPIUM BROMIDE AND ALBUTEROL SULFATE 1 AMPULE: .5; 3 SOLUTION RESPIRATORY (INHALATION) at 05:24

## 2021-06-16 RX ADMIN — FUROSEMIDE 20 MG: 20 TABLET ORAL at 08:01

## 2021-06-16 RX ADMIN — ENOXAPARIN SODIUM 40 MG: 40 INJECTION SUBCUTANEOUS at 08:01

## 2021-06-16 RX ADMIN — METOLAZONE 2.5 MG: 2.5 TABLET ORAL at 08:01

## 2021-06-16 RX ADMIN — CEFEPIME HYDROCHLORIDE 2000 MG: 2 INJECTION, POWDER, FOR SOLUTION INTRAVENOUS at 06:46

## 2021-06-16 RX ADMIN — CEFEPIME HYDROCHLORIDE 2000 MG: 2 INJECTION, POWDER, FOR SOLUTION INTRAVENOUS at 20:09

## 2021-06-16 RX ADMIN — SODIUM CHLORIDE, PRESERVATIVE FREE 10 ML: 5 INJECTION INTRAVENOUS at 20:09

## 2021-06-16 RX ADMIN — LEVOTHYROXINE SODIUM 175 MCG: 150 TABLET ORAL at 06:47

## 2021-06-16 RX ADMIN — PREDNISONE 40 MG: 20 TABLET ORAL at 08:01

## 2021-06-16 RX ADMIN — HYDROXYCHLOROQUINE SULFATE 200 MG: 200 TABLET ORAL at 08:01

## 2021-06-16 ASSESSMENT — PAIN SCALES - GENERAL
PAINLEVEL_OUTOF10: 0

## 2021-06-16 NOTE — PROGRESS NOTES
PT is alert and oriented. Feels about the same as yesterday. Skin color, turgor and temp are normal. Respirations unlabored while at rest becomes SOB with exertion. Breath sounds diminished. Abdomin is soft, no guarding. Appetite is fair, PT is in a ST rates in the low 100's Denies any chest pain. No lower extremity edema. Denies any wants or needs at this time.

## 2021-06-16 NOTE — PLAN OF CARE
Problem: Falls - Risk of:  Goal: Will remain free from falls  Description: Will remain free from falls  6/15/2021 2022 by Emily Martin RN  Outcome: Ongoing     Problem: Falls - Risk of:  Goal: Absence of physical injury  Description: Absence of physical injury  6/15/2021 2022 by Emily Martin RN  Outcome: Ongoing     Problem: Skin Integrity:  Goal: Will show no infection signs and symptoms  Description: Will show no infection signs and symptoms  6/15/2021 2022 by Emily Martin RN  Outcome: Ongoing     Problem: Skin Integrity:  Goal: Absence of new skin breakdown  Description: Absence of new skin breakdown  6/15/2021 2022 by Emily Martin RN  Outcome: Ongoing     Problem: Skin Integrity:  Goal: Demonstration of wound healing without infection will improve  Description: Demonstration of wound healing without infection will improve  6/15/2021 2022 by Emily Martin RN  Outcome: Ongoing     Problem: Skin Integrity:  Goal: Complications related to intravenous access or infusion will be avoided or minimized  Description: Complications related to intravenous access or infusion will be avoided or minimized  6/15/2021 2022 by Emily Martin RN  Outcome: Ongoing     Problem: Nutrition  Goal: Optimal nutrition therapy  6/15/2021 2022 by Emily Martin RN  Outcome: Ongoing     Problem: OXYGENATION/RESPIRATORY FUNCTION  Goal: Patient will maintain patent airway  6/15/2021 2022 by Emily Martin RN  Outcome: Ongoing     Problem: OXYGENATION/RESPIRATORY FUNCTION  Goal: Patient will achieve/maintain normal respiratory rate/effort  Description: Respiratory rate and effort will be within normal limits for the patient  6/15/2021 2022 by Emily Martin RN  Outcome: Ongoing     Problem: SKIN INTEGRITY  Goal: Skin integrity is maintained or improved  6/15/2021 2022 by Emily Martin RN  Outcome: Ongoing     Problem: NUTRITION  Goal: Nutritional status is improving  6/15/2021 2022 by Manjinder Riddle MARY Maloney  Outcome: Ongoing     Problem: Nutrition Deficit:  Goal: Ability to achieve adequate nutritional intake will improve  Description: Ability to achieve adequate nutritional intake will improve  6/15/2021 2022 by Keri Solis RN  Outcome: Ongoing     Problem: Physical Regulation:  Goal: Will remain free from infection  Description: Will remain free from infection  6/15/2021 2022 by Keri Solis RN  Outcome: Ongoing     Problem: Physical Regulation:  Goal: Ability to maintain vital signs within normal range will improve  Description: Ability to maintain vital signs within normal range will improve  6/15/2021 2022 by Keri Solis RN  Outcome: Ongoing     Problem: Gas Exchange - Impaired:  Goal: Levels of oxygenation will improve  Description: Levels of oxygenation will improve  6/15/2021 2022 by Keri Solis RN  Outcome: Ongoing     Problem: Coping:  Goal: Ability to remain calm will improve  Description: Ability to remain calm will improve  6/15/2021 2022 by Keri Solis RN  Outcome: Ongoing     Problem: Safety:  Goal: Ability to remain free from injury will improve  Description: Ability to remain free from injury will improve  6/15/2021 2022 by Keri Solis RN  Outcome: Ongoing     Problem: Self-Care:  Goal: Ability to participate in self-care as condition permits will improve  Description: Ability to participate in self-care as condition permits will improve  6/15/2021 2022 by Keri Solis RN  Outcome: Ongoing
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
## 2021-06-16 NOTE — PROGRESS NOTES
Awoke suddenly and is confused as to date time and location. PT is somewhat argumentative about wearing her O2. PT reoriented and returned to bed.

## 2021-06-16 NOTE — PROGRESS NOTES
Comprehensive Nutrition Assessment    Type and Reason for Visit:  Reassess    Nutrition Recommendations/Plan: Encourage oral fluids    Nutrition Assessment:  Continued malnutrition r/t inadequate nutrient intakes, AEB prior weight losses, and losses in fat and muscle stores. PO recorded as % most recently, with only fair acceptance of supplement. Her weight is up 3# from admission, with +3.3 L per I/O (no oral fluid intakes recorded yesterday however). Noted questions of Hospice plan upon d/c. Food and fluid for comfort would be appropriate if hospice is desired. CO2 retention continues, and pCO2 is as low as it's been since admission. Malnutrition Assessment:  Malnutrition Status:  Severe malnutrition    Context:  Acute Illness     Findings of the 6 clinical characteristics of malnutrition:  Energy Intake:  7 - 50% or less of estimated energy requirements for 5 or more days  Weight Loss:  1 - 5% over 1 month     Body Fat Loss:  1 - Mild body fat loss Buccal region   Muscle Mass Loss:  7 - Moderate muscle mass loss Clavicles (pectoralis & deltoids), Temples (temporalis)  Fluid Accumulation:  1 - Mild Extremities   Strength:  Not Performed    Estimated Daily Nutrient Needs:  Energy (kcal):  5562-3193 (28-32); Weight Used for Energy Requirements:  Current     Protein (g):  68-82 (1.5-1.8); Weight Used for Protein Requirements:  Current        Fluid (ml/day):  1500; Method Used for Fluid Requirements:  1 ml/kcal      Nutrition Related Findings:  trace edema, soft bm, + b/s      Wounds:  None       Current Nutrition Therapies:    ADULT DIET;  Regular; Low Fat/Low Chol/High Fiber/2 gm Na  Adult Oral Nutrition Supplement; Standard High Calorie/High Protein Oral Supplement    Anthropometric Measures:  · Height: 5' (152.4 cm)  · Current Body Weight: 106 lb 4.8 oz (48.2 kg)   · Admission Body Weight: 103 lb 4.8 oz (46.9 kg)    · Usual Body Weight: 121 lb (54.9 kg)     · Ideal Body Weight: 100 lbs; % Ideal Body Weight 100.5 %   · BMI: 20.8  · Adjusted Body Weight:  ; No Adjustment   · BMI Categories: Normal Weight (BMI 18.5-24. 9)       Nutrition Diagnosis:   · Severe malnutrition related to inadequate protein-energy intake as evidenced by weight loss greater than or equal to 5% in 1 month, poor intake prior to admission, localized or generalized fluid accumulation, moderate muscle loss, mild loss of subcutaneous fat    Lab Results   Component Value Date     (L) 06/15/2021    K 4.5 06/15/2021    CL 84 (LL) 06/15/2021    CO2 >45 (HH) 06/15/2021    BUN 24 (H) 06/15/2021    CREATININE 0.52 06/15/2021    GLUCOSE 83 06/15/2021    CALCIUM 9.1 06/15/2021    PROT 7.1 06/09/2021    LABALBU 3.4 (L) 06/09/2021    BILITOT 0.55 06/09/2021    ALKPHOS 79 06/09/2021    AST 25 06/09/2021    ALT 19 06/09/2021    LABGLOM >60 06/15/2021    GFRAA >60 06/15/2021    GLOB NOT REPORTED 11/02/2017     ABG  Lab Results   Component Value Date    VOB2XCY 49.3 06/16/2021    H6DIAZCQ 98.9 06/16/2021    PHART 7.435 06/16/2021    WWQ3RPT 75.1 06/16/2021    PO2ART 150.1 06/16/2021     Nutrition Interventions:   Food and/or Nutrient Delivery:  Continue Current Diet, Continue Oral Nutrition Supplement  Nutrition Education/Counseling:  No recommendation at this time   Coordination of Nutrition Care:  Continue to monitor while inpatient    Goals:  PO >75% meals and supplements       Nutrition Monitoring and Evaluation:   Behavioral-Environmental Outcomes:   Other (Comment) (lethargy with condition)   Food/Nutrient Intake Outcomes:  Food and Nutrient Intake, Supplement Intake  Physical Signs/Symptoms Outcomes:  Biochemical Data, Weight, Fluid Status or Edema     Discharge Planning:    Continue Oral Nutrition Supplement, Continue current diet     Electronically signed by Berry Garduno RD, LD on 6/16/21 at 6:17 AM EDT    Contact: [phone]

## 2021-06-16 NOTE — PROGRESS NOTES
Patient's home BIPAP mask does not fit her face well. The straps are too long and is ill fitting to her face. Suggest getting a new style of mask for trilogy machine.
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
## 2021-06-16 NOTE — PROGRESS NOTES
ICU Progress Note    SUBJECTIVE/INTERVAL HISTORY:    Patient seen in follow up for respiratory failure. Resting in bed alert and oriented in no acute distress. Patient currently on 1 L of oxygen with SPO2 of 97 to 98%. Nursing reports that she did have some confusion early this morning however she appears to be intact at this time. Her  is at her bedside. OBJECTIVE:    Vitals:   Temp: 98 °F (36.7 °C)  Temp range:    Temp  Av.1 °F (36.7 °C)  Min: 97.2 °F (36.2 °C)  Max: 98.9 °F (37.2 °C)    BP: 112/65  BP Range:      Systolic (89UAR), SVU:544 , Min:88 , TRM:570      Diastolic (67RMR), FYD:79, Min:59, Max:97    Pulse: 103  Pulse Range:    Pulse  Av.5  Min: 87  Max: 112    Resp: 21  Resp Range:   Resp  Av  Min: 11  Max: 21    SpO2: 94 % on supplemental O2  SpO2 range:   SpO2  Av.1 %  Min: 83 %  Max: 99 %    24HR INTAKE/OUTPUT:      Intake/Output Summary (Last 24 hours) at 2021 1052  Last data filed at 2021 0904  Gross per 24 hour   Intake 460 ml   Output 500 ml   Net -40 ml       -----------------------------------------------------------------  Review of Systems:  Constitutional:negative  for fevers, and negative for chills.   Eyes: negative for visual disturbance   ENT: negative for sore throat, negative nasal congestion, and negative for earache  Respiratory: positive for shortness of breath, negative for cough, and negative for wheezing  Cardiovascular: negative for chest pain, negative for palpitations, and negative for syncope  Gastrointestinal: negative for abdominal pain, negative for nausea,negative for vomiting, negative for diarrhea, negative for constipation, and negative for hematochezia or melena  Genitourinary: negative for dysuria, negative for urinary urgency, negative for urinary frequency, and negative for hematuria  Skin: negative for skin rash, and negative for skin lesions  Neurological: negative for unilateral weakness, numbness or tingling. Exam:  GEN:  alert and oriented to person, place and time, alert and frail-appearing  EYES: No gross abnormalities. , PERRL and EOMI  NECK: normal, supple, no lymphadenopathy,  no carotid bruits  PULM: clear to auscultation bilaterally- no wheezes, rales or rhonchi, normal air movement, no respiratory distress  COR: regular rate & rhythm, no murmurs, no gallops, S1 normal and S2 normal  ABD:  soft, non-tender, non-distended, normal bowel sounds, no masses or organomegaly  EXT:   no cyanosis, clubbing or edema present    NEURO: follows commands, BALDWIN, no deficits  SKIN:  no rashes or significant lesions      -----------------------------------------------------------------  Diagnostic Data:  Lab Results   Component Value Date    WBC 7.0 06/12/2021    HGB 11.2 (L) 06/12/2021    HCT 36.6 06/12/2021    PLT See Reflexed IPF Result 06/12/2021    HDL 68 09/15/2020    ALT 19 06/09/2021    AST 25 06/09/2021     (L) 06/16/2021    K 4.8 06/16/2021    CL 82 (LL) 06/16/2021    CREATININE 0.63 06/16/2021    BUN 24 (H) 06/16/2021    CO2 >45 (HH) 06/16/2021    TSH 3.32 06/14/2021    INR 1.0 08/27/2019    GLUF 91 09/15/2020     ASSESSMENT:    Principal Problem:    Acute on chronic respiratory failure with hypoxia and hypercapnia (HCC)  Active Problems:    Pulmonary hypertension due to hypoxia (HCC) /  interstitial lung disease related    IPF (idiopathic pulmonary fibrosis) (HCC)    Severe malnutrition (HCC)    Respiratory acidosis from CO2 retention with Interstitial Lung Dz. Transient alteration of awareness    COPD exacerbation ([address]  Resolved Problems:    * No resolved hospital problems.  *      Patient Active Problem List    Diagnosis Date Noted    Respiratory acidosis from CO2 retention with Interstitial Lung Dz. 06/09/2021    Transient alteration of awareness 06/09/2021    COPD exacerbation ([address] 06/09/2021    Major depressive disorder with single episode, in full remission ([email or web address] 01/22/2021    Severe malnutrition ([facility] 08/28/2019    BAYRON (obstructive sleep apnea) 11/02/2017    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 11/02/2017    Chronic cor pulmonale ([facility] 11/02/2017    Pulmonary hypertension due to hypoxia [facility]) /  interstitial lung disease related 11/02/2017    IPF (idiopathic pulmonary fibrosis) ([facility] 11/02/2017    Acute on chronic respiratory failure with hypoxia and hypercapnia (HCC) 08/11/2017    Chronic interstitial lung disease ([facility] 06/23/2017    Kyphoscoliosis 05/01/2017    Hypothyroidism        PLAN:  Principal Problem:    Acute on chronic respiratory failure with hypoxia and hypercapnia (HCC)  Active Problems:    Pulmonary hypertension due to hypoxia (HCC) /  interstitial lung disease related    IPF (idiopathic pulmonary fibrosis) (HCC)    Severe malnutrition (HCC)    Respiratory acidosis from CO2 retention with Interstitial Lung Dz. Transient alteration of awareness    COPD exacerbation ([facility]  Resolved Problems:    * No resolved hospital problems. *    · Acute on chronic respiratory failure withy hypercapnia, respiratory acidosis with metabolic encephalopathy  ? Appreciate pulmonology  ? BiPAP  ? Wean supplemental O2 to keep SpO2 around 88%  ? Trend ABG's-improved today  ? Cefepime for bronchiectasis per Dr. Razo Click  ? Repeat chest x-ray -no change from previous  ? I I spoke to Dr. Woods Sender with pulmonology yesterday to discuss possibility of either trilogy or hospice per their recommendation. Patient was started on trilogy in May 2021. He stated at this time trilogy or BiPAP does not really matter however we need to think about quality of life at this point. He does agree that hospice would be a good opportunity for her unless she wants to live on a mask every day. · Pulmonary fibrosis  ? Continue Plaquenil  · BAYRON on CPAP  ? Continue home trilogy  · DVT prophylaxis: Lovenox   · Nutrition status: malnutrition  ?  Dietician consult initiated  · High risk medications: none · Discharge plan: Home possibly with hospice on Friday    MANJINDER Godinez - CNP, MANJINDER, NP-C  6/16/2021, 10:52 AM

## 2021-06-16 NOTE — RT PROTOCOL NOTE
Notify physician if condition deteriorates. HHN AEROSOL THERAPY with  [physician-ordered bronchodilator(s)]  QID and Albuterol PRN q4 hrs. Breath-Actuated Neb if BBS Acuity = 4, and pt. can use MP. Notify physician if condition deteriorates. MDI THERAPY with  2 actuations of [physician-ordered bronchodilator(s)] via spacer TID Albuterol and PRNq4 hrs. If unable to utilize MDI: HHN [physician-ordered bronchodilator(s)] TID and Albuterol PRN q4 hrs. Notify physician if condition deteriorates. MDI THERAPY with  [physician-ordered bronchodilator(s)] via spacer TID PRN. If unable to utilize MDI: HHN [physician-ordered bronchodilator(s)] TID PRN. Notify physician if condition deteriorates. If Acuity Level is 2, 3, or 4 in any of the following:    [] COUGH     [] SURGICAL HISTORY (SURG HX)  [] CHEST XRAY (CXR)    Goal: Improvement in sputum mobilization in patients with ineffective airway clearance. Reverse atelectasis. [x] Bronchopulmonary Hygiene Protocol    Total Acuity:   16-32  []  Secondary Assessment in 24 hrs Total Acuity:  9-15  [x]  Secondary Assessment in 24 hrs Total Acuity:  4-8  []  Secondary Assessment in 48 hrs Total Acuity:  0-3  []  Secondary Assessment in 72 hrs   METANEB QID with [physician-ordered bronchodilator(s)] if CXR Acuity = 4; otherwise:  PD&P, PEP, or Vest QID & PRN  NT Sxn PRN for ineffective cough  METANEB QID with [physician-ordered bronchodilator(s)] if CXR Acuity = 4; otherwise:  PD&P, PEP, or Vest TID & PRN  NT Sxn PRN for ineffective cough  Instruct patient to self-perform IS q1hr WA   Directed Cough self-performed q1hr WA     If Acuity Level is 2 or above in the following:    [] PULMONARY HISTORY (PULM HX)    Goal: Assist patient in quitting smoking to slow or stop the progression of lung disease.     [] Smoking Cessation Protocol    SMOKING CESSATION EDUCATION provided according to policy [id number]: (agatha with an X)  ____Yes    ____ No     ____ NA    Smoking Cessation Booklet given:  ____Yes  ____No ____Patient Jorge Palma

## 2021-06-17 LAB
ALLEN TEST: ABNORMAL
ANION GAP SERPL CALCULATED.3IONS-SCNC: ABNORMAL MMOL/L (ref 9–17)
BUN BLDV-MCNC: 30 MG/DL (ref 8–23)
BUN/CREAT BLD: 49 (ref 9–20)
CALCIUM SERPL-MCNC: 9.2 MG/DL (ref 8.6–10.4)
CARBOXYHEMOGLOBIN: ABNORMAL % (ref 0–5)
CHLORIDE BLD-SCNC: 84 MMOL/L (ref 98–107)
CO2: >45 MMOL/L (ref 20–31)
CREAT SERPL-MCNC: 0.61 MG/DL (ref 0.5–0.9)
FIO2: ABNORMAL
GFR AFRICAN AMERICAN: >60 ML/MIN
GFR NON-AFRICAN AMERICAN: >60 ML/MIN
GFR SERPL CREATININE-BSD FRML MDRD: ABNORMAL ML/MIN/{1.73_M2}
GFR SERPL CREATININE-BSD FRML MDRD: ABNORMAL ML/MIN/{1.73_M2}
GLUCOSE BLD-MCNC: 90 MG/DL (ref 70–99)
HCO3 ARTERIAL: 44.9 MMOL/L (ref 22–26)
METHEMOGLOBIN: ABNORMAL % (ref 0–1.9)
MODE: ABNORMAL
NEGATIVE BASE EXCESS, ART: ABNORMAL MMOL/L (ref 0–2)
NOTIFICATION TIME: ABNORMAL
NOTIFICATION: ABNORMAL
O2 DEVICE/FLOW/%: ABNORMAL
O2 SAT, ARTERIAL: 96.9 % (ref 95–98)
OXYHEMOGLOBIN: ABNORMAL % (ref 95–98)
PATIENT TEMP: 37
PCO2 ARTERIAL: 69 MMHG (ref 35–45)
PCO2, ART, TEMP ADJ: ABNORMAL (ref 35–45)
PEEP/CPAP: ABNORMAL
PH ARTERIAL: 7.43 (ref 7.35–7.45)
PH, ART, TEMP ADJ: ABNORMAL (ref 7.35–7.45)
PO2 ARTERIAL: 92 MMHG (ref 80–100)
PO2, ART, TEMP ADJ: ABNORMAL MMHG (ref 80–100)
POSITIVE BASE EXCESS, ART: 16.6 MMOL/L (ref 0–2)
POTASSIUM SERPL-SCNC: 4 MMOL/L (ref 3.7–5.3)
PSV: ABNORMAL
PT. POSITION: ABNORMAL
RESPIRATORY RATE: 15
SAMPLE SITE: ABNORMAL
SET RATE: ABNORMAL
SODIUM BLD-SCNC: 132 MMOL/L (ref 135–144)
TEXT FOR RESPIRATORY: ABNORMAL
TOTAL HB: ABNORMAL G/DL (ref 12–16)
TOTAL RATE: ABNORMAL
VT: ABNORMAL

## 2021-06-17 PROCEDURE — 94669 MECHANICAL CHEST WALL OSCILL: CPT

## 2021-06-17 PROCEDURE — 94640 AIRWAY INHALATION TREATMENT: CPT

## 2021-06-17 PROCEDURE — 2700000000 HC OXYGEN THERAPY PER DAY

## 2021-06-17 PROCEDURE — 97530 THERAPEUTIC ACTIVITIES: CPT

## 2021-06-17 PROCEDURE — 6360000002 HC RX W HCPCS: Performed by: NURSE PRACTITIONER

## 2021-06-17 PROCEDURE — 36415 COLL VENOUS BLD VENIPUNCTURE: CPT

## 2021-06-17 PROCEDURE — 36600 WITHDRAWAL OF ARTERIAL BLOOD: CPT

## 2021-06-17 PROCEDURE — 2580000003 HC RX 258: Performed by: NURSE PRACTITIONER

## 2021-06-17 PROCEDURE — 82805 BLOOD GASES W/O2 SATURATION: CPT

## 2021-06-17 PROCEDURE — 97535 SELF CARE MNGMENT TRAINING: CPT

## 2021-06-17 PROCEDURE — 97110 THERAPEUTIC EXERCISES: CPT

## 2021-06-17 PROCEDURE — 97116 GAIT TRAINING THERAPY: CPT

## 2021-06-17 PROCEDURE — 94664 DEMO&/EVAL PT USE INHALER: CPT

## 2021-06-17 PROCEDURE — 6370000000 HC RX 637 (ALT 250 FOR IP): Performed by: NURSE PRACTITIONER

## 2021-06-17 PROCEDURE — 80048 BASIC METABOLIC PNL TOTAL CA: CPT

## 2021-06-17 PROCEDURE — 1200000000 HC SEMI PRIVATE

## 2021-06-17 PROCEDURE — 94761 N-INVAS EAR/PLS OXIMETRY MLT: CPT

## 2021-06-17 PROCEDURE — 6360000002 HC RX W HCPCS: Performed by: INTERNAL MEDICINE

## 2021-06-17 PROCEDURE — 6370000000 HC RX 637 (ALT 250 FOR IP): Performed by: INTERNAL MEDICINE

## 2021-06-17 PROCEDURE — 2580000003 HC RX 258: Performed by: INTERNAL MEDICINE

## 2021-06-17 RX ADMIN — PREDNISONE 40 MG: 20 TABLET ORAL at 07:08

## 2021-06-17 RX ADMIN — SERTRALINE HYDROCHLORIDE 50 MG: 50 TABLET ORAL at 09:41

## 2021-06-17 RX ADMIN — SODIUM CHLORIDE, PRESERVATIVE FREE 10 ML: 5 INJECTION INTRAVENOUS at 07:15

## 2021-06-17 RX ADMIN — CEFEPIME HYDROCHLORIDE 2000 MG: 2 INJECTION, POWDER, FOR SOLUTION INTRAVENOUS at 07:15

## 2021-06-17 RX ADMIN — IPRATROPIUM BROMIDE AND ALBUTEROL SULFATE 1 AMPULE: .5; 3 SOLUTION RESPIRATORY (INHALATION) at 16:05

## 2021-06-17 RX ADMIN — LEVOTHYROXINE SODIUM 175 MCG: 150 TABLET ORAL at 07:08

## 2021-06-17 RX ADMIN — IPRATROPIUM BROMIDE AND ALBUTEROL SULFATE 1 AMPULE: .5; 3 SOLUTION RESPIRATORY (INHALATION) at 05:25

## 2021-06-17 RX ADMIN — SPIRONOLACTONE 25 MG: 25 TABLET, FILM COATED ORAL at 09:41

## 2021-06-17 RX ADMIN — POTASSIUM CHLORIDE 20 MEQ: 1500 TABLET, EXTENDED RELEASE ORAL at 09:41

## 2021-06-17 RX ADMIN — IPRATROPIUM BROMIDE AND ALBUTEROL SULFATE 1 AMPULE: .5; 3 SOLUTION RESPIRATORY (INHALATION) at 10:40

## 2021-06-17 RX ADMIN — ENOXAPARIN SODIUM 40 MG: 40 INJECTION SUBCUTANEOUS at 09:41

## 2021-06-17 RX ADMIN — FUROSEMIDE 20 MG: 20 TABLET ORAL at 09:41

## 2021-06-17 RX ADMIN — PANTOPRAZOLE SODIUM 40 MG: 40 TABLET, DELAYED RELEASE ORAL at 07:09

## 2021-06-17 RX ADMIN — IPRATROPIUM BROMIDE AND ALBUTEROL SULFATE 1 AMPULE: .5; 3 SOLUTION RESPIRATORY (INHALATION) at 19:31

## 2021-06-17 RX ADMIN — HYDROXYCHLOROQUINE SULFATE 200 MG: 200 TABLET ORAL at 09:41

## 2021-06-17 ASSESSMENT — PAIN SCALES - GENERAL: PAINLEVEL_OUTOF10: 0

## 2021-06-17 NOTE — PROGRESS NOTES
Pt assisted to bedside commode. 200 ml urine recorded then patient assisted back to bed. Pt positioned comfortably. Pt denies any other needs at this time. Call light and bedside table within reach.

## 2021-06-17 NOTE — PROGRESS NOTES
Initial shift assessment done and VS obtained as charted in flow sheet. Pt is sitting up in chair watching television with family visiting. Pt denies any pain at this time. Pt has no complaints or needs at this time. Call light and bedside table within reach. Will monitor.

## 2021-06-17 NOTE — PROGRESS NOTES
Received call from lab with critical values on this PT, Chloride 84 and Co2 greater than 45.  Reported to [facility]
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
## 2021-06-17 NOTE — PROGRESS NOTES
Pt reassessed and VS rechecked. Pt denies any pain at this time and is resting in bed. Pt assisted to bedside commode, output recorded. Pt returned to bed and positioned comfortably. Pt denies any other needs at this time, call light within reach. Will monitor.

## 2021-06-17 NOTE — PLAN OF CARE
Problem: Falls - Risk of:  Goal: Will remain free from falls  Description: Will remain free from falls  6/17/2021 1127 by Dane iMx RN  Outcome: Ongoing  Note: Patient is alert and oriented and has demonstrated the use of the call light for assistance before getting up. Education has been provided to defer the use of the bed alarm and/or restraint free alarm as the patient has shown competency in calling for assistance and verbalizing the risk. Problem: Skin Integrity:  Goal: Will show no infection signs and symptoms  Description: Will show no infection signs and symptoms  Outcome: Ongoing  Note: NO s/s infection noted. Problem: Skin Integrity:  Goal: Absence of new skin breakdown  Description: Absence of new skin breakdown  6/17/2021 1127 by Dane Mix RN  Outcome: Ongoing  Note: Skin assessment performed at regular intervals. Buttocks pink, no open areas noted. Pt able to turn self, assistance provided when needed. Will continue to monitor patient closely for breakdown. Problem: OXYGENATION/RESPIRATORY FUNCTION  Goal: Patient will maintain patent airway  Outcome: Ongoing  Note: Patent airway maintained. Spo2 maintained low 90s on 1L of oxygen via nc. Problem: Nutrition Deficit:  Goal: Ability to achieve adequate nutritional intake will improve  Description: Ability to achieve adequate nutritional intake will improve  Outcome: Ongoing  Note: Patient tolerates po food and fluids. High mellissa supplement given with meals, taken well.
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
## 2021-06-17 NOTE — PROGRESS NOTES
Progress Note    SUBJECTIVE/INTERVAL HISTORY:    Patient seen in follow up for respiratory failure. Sitting up in chair alert and oriented in no acute distress. Patient currently on 1 L of oxygen. She denies complaints at this time. She is afebrile. Tolerating diet and activity fairly well. OBJECTIVE:    Vitals:   Temp: 98.6 °F (37 °C)  Temp range:    Temp  Av.4 °F (36.9 °C)  Min: 97.6 °F (36.4 °C)  Max: 99.2 °F (37.3 °C)    BP: 113/74  BP Range:      Systolic (10MAX), KNQ:301 , Min:113 , JXH:025      Diastolic (47ROY), MXZ:35, Min:67, Max:74    Pulse: 108  Pulse Range:    Pulse  Av.3  Min: 100  Max: 108    Resp: 18  Resp Range:   Resp  Av.5  Min: 18  Max: 20    SpO2: 92 % on supplemental O2  SpO2 range:   SpO2  Av.3 %  Min: 91 %  Max: 94 %    24HR INTAKE/OUTPUT:      Intake/Output Summary (Last 24 hours) at 2021 1609  Last data filed at 2021 1440  Gross per 24 hour   Intake 540 ml   Output 900 ml   Net -360 ml       -----------------------------------------------------------------  Exam:  GEN:  alert and oriented to person, place and time, alert and frail-appearing  EYES: No gross abnormalities. , PERRL and EOMI  NECK: normal, supple, no lymphadenopathy,  no carotid bruits  PULM: clear to auscultation bilaterally- no wheezes, rales or rhonchi, normal air movement, no respiratory distress  COR: regular rate & rhythm, no murmurs, no gallops, S1 normal and S2 normal  ABD:  soft, non-tender, non-distended, normal bowel sounds, no masses or organomegaly  EXT:   no cyanosis, clubbing or edema present    NEURO: follows commands, BALDWIN, no deficits  SKIN:  no rashes or significant lesions      -----------------------------------------------------------------  Diagnostic Data:  Lab Results   Component Value Date    WBC 7.0 2021    HGB 11.2 (L) 2021    HCT 36.6 2021    PLT See Reflexed IPF Result 2021    HDL 68 09/15/2020    ALT 19 2021    AST 25 2021  (L) 06/17/2021    K 4.0 06/17/2021    CL 84 (LL) 06/17/2021    CREATININE 0.61 06/17/2021    BUN 30 (H) 06/17/2021    CO2 >45 (HH) 06/17/2021    TSH 3.32 06/14/2021    INR 1.0 08/27/2019    GLUF 91 09/15/2020     ASSESSMENT:    Principal Problem:    Acute on chronic respiratory failure with hypoxia and hypercapnia (HCC)  Active Problems:    Pulmonary hypertension due to hypoxia (HCC) /  interstitial lung disease related    IPF (idiopathic pulmonary fibrosis) (HCC)    Severe malnutrition (HCC)    Respiratory acidosis from CO2 retention with Interstitial Lung Dz. Transient alteration of awareness    COPD exacerbation ([address]  Resolved Problems:    * No resolved hospital problems. *      Patient Active Problem List    Diagnosis Date Noted    Respiratory acidosis from CO2 retention with Interstitial Lung Dz. 06/09/2021    Transient alteration of awareness 06/09/2021    COPD exacerbation ([address] 06/09/2021    Major depressive disorder with single episode, in full remission ([address] 01/22/2021    Severe malnutrition ([address] 08/28/2019    BAYRON (obstructive sleep apnea) 11/02/2017    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 11/02/2017    Chronic cor pulmonale ([address] 11/02/2017    Pulmonary hypertension due to hypoxia [facility]) /  interstitial lung disease related 11/02/2017    IPF (idiopathic pulmonary fibrosis) ([address] 11/02/2017    Acute on chronic respiratory failure with hypoxia and hypercapnia (HCC) 08/11/2017    Chronic interstitial lung disease ([address] 06/23/2017    Kyphoscoliosis 05/01/2017    Hypothyroidism        PLAN:  Principal Problem:    Acute on chronic respiratory failure with hypoxia and hypercapnia (HCC)  Active Problems:    Pulmonary hypertension due to hypoxia (HCC) /  interstitial lung disease related    IPF (idiopathic pulmonary fibrosis) (HCC)    Severe malnutrition ([address]    Respiratory acidosis from CO2 retention with Interstitial Lung Dz.     Transient alteration of awareness    COPD exacerbation ([address]  Resolved Problems:    * No resolved hospital problems. *    · Acute on chronic respiratory failure withy hypercapnia, respiratory acidosis with metabolic encephalopathy  ? Appreciate pulmonology  ? Trilogy from home  ? Wean supplemental O2 to keep SpO2 around 88%  ? Trend ABG's-stable  ? Stop cefepime  ? Repeat chest x-ray -no change from previous  ? I I spoke to Dr. Vanessa Garcia with pulmonology yesterday to discuss possibility of either trilogy or hospice per their recommendation. Patient was started on trilogy in May 2021. He stated at this time trilogy or BiPAP does not really matter however we need to think about quality of life at this point. He does agree that hospice would be a good opportunity for her unless she wants to live on a mask every day. · Pulmonary fibrosis  ? Continue Plaquenil  · BAYRON on CPAP  ? Continue home trilogy  · DVT prophylaxis: Lovenox   · Nutrition status: malnutrition  ?  Dietician consult initiated  · High risk medications: none   · Discharge plan: Home with hospice on Friday    MANJINDER Joseph - CNP, MANJINDER, NP-C  6/17/2021, 4:09 PM

## 2021-06-17 NOTE — PROGRESS NOTES
Pt noted to have swelling to left arm at this time when Lesta RT was in room to obtain ABG this morning. Pt has no complaints of pain. No redness noted. Will continue to monitor.
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
## 2021-06-17 NOTE — PLAN OF CARE
Problem: Falls - Risk of:  Goal: Will remain free from falls  Description: Will remain free from falls  Outcome: Ongoing  Note: Pt bed in lowest position with wheels locked. Call light within reach. Bed alarm in place. Room remains free of obstacles. Pt reminded to use call light as needed, verbalizes understanding. Will continue to monitor. Problem: Skin Integrity:  Goal: Absence of new skin breakdown  Description: Absence of new skin breakdown  Outcome: Ongoing  Note: Skin assessment done as charted. Problem: OXYGENATION/RESPIRATORY FUNCTION  Goal: Patient will achieve/maintain normal respiratory rate/effort  Description: Respiratory rate and effort will be within normal limits for the patient  Outcome: Ongoing     Problem: Physical Regulation:  Goal: Diagnostic test results will improve  Description: Diagnostic test results will improve  Outcome: Ongoing  Note: Labs monitored.       Problem: Gas Exchange - Impaired:  Goal: Levels of oxygenation will improve  Description: Levels of oxygenation will improve  Outcome: Ongoing

## 2021-06-18 VITALS
HEART RATE: 102 BPM | HEIGHT: 60 IN | DIASTOLIC BLOOD PRESSURE: 78 MMHG | WEIGHT: 97.3 LBS | BODY MASS INDEX: 19.1 KG/M2 | SYSTOLIC BLOOD PRESSURE: 114 MMHG | RESPIRATION RATE: 18 BRPM | OXYGEN SATURATION: 92 % | TEMPERATURE: 97.7 F

## 2021-06-18 LAB
ANION GAP SERPL CALCULATED.3IONS-SCNC: ABNORMAL MMOL/L (ref 9–17)
BUN BLDV-MCNC: 38 MG/DL (ref 8–23)
BUN/CREAT BLD: 63 (ref 9–20)
CALCIUM SERPL-MCNC: 8.9 MG/DL (ref 8.6–10.4)
CHLORIDE BLD-SCNC: 84 MMOL/L (ref 98–107)
CO2: >45 MMOL/L (ref 20–31)
CREAT SERPL-MCNC: 0.6 MG/DL (ref 0.5–0.9)
GFR AFRICAN AMERICAN: >60 ML/MIN
GFR NON-AFRICAN AMERICAN: >60 ML/MIN
GFR SERPL CREATININE-BSD FRML MDRD: ABNORMAL ML/MIN/{1.73_M2}
GFR SERPL CREATININE-BSD FRML MDRD: ABNORMAL ML/MIN/{1.73_M2}
GLUCOSE BLD-MCNC: 93 MG/DL (ref 70–99)
POTASSIUM SERPL-SCNC: 3.8 MMOL/L (ref 3.7–5.3)
SODIUM BLD-SCNC: 133 MMOL/L (ref 135–144)

## 2021-06-18 PROCEDURE — 6370000000 HC RX 637 (ALT 250 FOR IP): Performed by: NURSE PRACTITIONER

## 2021-06-18 PROCEDURE — 97116 GAIT TRAINING THERAPY: CPT

## 2021-06-18 PROCEDURE — 94761 N-INVAS EAR/PLS OXIMETRY MLT: CPT

## 2021-06-18 PROCEDURE — 82805 BLOOD GASES W/O2 SATURATION: CPT

## 2021-06-18 PROCEDURE — 6360000002 HC RX W HCPCS: Performed by: NURSE PRACTITIONER

## 2021-06-18 PROCEDURE — 36415 COLL VENOUS BLD VENIPUNCTURE: CPT

## 2021-06-18 PROCEDURE — 97530 THERAPEUTIC ACTIVITIES: CPT

## 2021-06-18 PROCEDURE — 2700000000 HC OXYGEN THERAPY PER DAY

## 2021-06-18 PROCEDURE — 94640 AIRWAY INHALATION TREATMENT: CPT

## 2021-06-18 PROCEDURE — 94669 MECHANICAL CHEST WALL OSCILL: CPT

## 2021-06-18 PROCEDURE — 6370000000 HC RX 637 (ALT 250 FOR IP): Performed by: INTERNAL MEDICINE

## 2021-06-18 PROCEDURE — 80048 BASIC METABOLIC PNL TOTAL CA: CPT

## 2021-06-18 RX ADMIN — HYDROXYCHLOROQUINE SULFATE 200 MG: 200 TABLET ORAL at 10:03

## 2021-06-18 RX ADMIN — LEVOTHYROXINE SODIUM 175 MCG: 150 TABLET ORAL at 10:03

## 2021-06-18 RX ADMIN — METOLAZONE 2.5 MG: 2.5 TABLET ORAL at 10:03

## 2021-06-18 RX ADMIN — POTASSIUM CHLORIDE 20 MEQ: 1500 TABLET, EXTENDED RELEASE ORAL at 10:04

## 2021-06-18 RX ADMIN — IPRATROPIUM BROMIDE AND ALBUTEROL SULFATE 1 AMPULE: .5; 3 SOLUTION RESPIRATORY (INHALATION) at 05:14

## 2021-06-18 RX ADMIN — PANTOPRAZOLE SODIUM 40 MG: 40 TABLET, DELAYED RELEASE ORAL at 10:03

## 2021-06-18 RX ADMIN — SPIRONOLACTONE 25 MG: 25 TABLET, FILM COATED ORAL at 10:04

## 2021-06-18 RX ADMIN — IPRATROPIUM BROMIDE AND ALBUTEROL SULFATE 1 AMPULE: .5; 3 SOLUTION RESPIRATORY (INHALATION) at 10:24

## 2021-06-18 RX ADMIN — PREDNISONE 40 MG: 20 TABLET ORAL at 10:03

## 2021-06-18 RX ADMIN — SERTRALINE HYDROCHLORIDE 50 MG: 50 TABLET ORAL at 10:04

## 2021-06-18 RX ADMIN — ENOXAPARIN SODIUM 40 MG: 40 INJECTION SUBCUTANEOUS at 10:04

## 2021-06-18 RX ADMIN — FUROSEMIDE 20 MG: 20 TABLET ORAL at 10:03

## 2021-06-18 NOTE — PROGRESS NOTES
Discharge instructions reviewed with patient and family. Denies any questions or concerns at this time. Ambulates to bathroom to get dressed at this time.

## 2021-06-18 NOTE — PROGRESS NOTES
Progress Note    SUBJECTIVE/INTERVAL HISTORY:    Patient seen in follow up for respiratory failure. Sitting in bed alert and oriented in no acute distress. Patient currently on 1 L of oxygen. She denies complaints at this time. She is afebrile. Tolerating diet and activity fairly well. OBJECTIVE:    Vitals:   Temp: 97.7 °F (36.5 °C)  Temp range:    Temp  Av.2 °F (36.8 °C)  Min: 97.2 °F (36.2 °C)  Max: 99.3 °F (37.4 °C)    BP: 114/78  BP Range:      Systolic (78SXO), QBV:245 , Min:113 , RYA:215      Diastolic (65VMM), YKM:24, Min:69, Max:78    Pulse: 102  Pulse Range:    Pulse  Av  Min: 102  Max: 110    Resp: 18  Resp Range:   Resp  Av  Min: 18  Max: 18    SpO2: 92 % on supplemental O2  SpO2 range:   SpO2  Av.9 %  Min: 92 %  Max: 95 %    24HR INTAKE/OUTPUT:      Intake/Output Summary (Last 24 hours) at 2021 1124  Last data filed at 2021 1000  Gross per 24 hour   Intake 840 ml   Output 950 ml   Net -110 ml       -----------------------------------------------------------------  Exam:  GEN:  alert and oriented to person, place and time, alert and frail-appearing  EYES: No gross abnormalities. , PERRL and EOMI  NECK: normal, supple, no lymphadenopathy,  no carotid bruits  PULM: clear to auscultation bilaterally- no wheezes, rales or rhonchi, normal air movement, no respiratory distress  COR: regular rate & rhythm, no murmurs, no gallops, S1 normal and S2 normal  ABD:  soft, non-tender, non-distended, normal bowel sounds, no masses or organomegaly  EXT:   no cyanosis, clubbing or edema present    NEURO: follows commands, BALDWIN, no deficits  SKIN:  no rashes or significant lesions      -----------------------------------------------------------------  Diagnostic Data:  Lab Results   Component Value Date    WBC 7.0 2021    HGB 11.2 (L) 2021    HCT 36.6 2021    PLT See Reflexed IPF Result 2021    HDL 68 09/15/2020    ALT 19 2021    AST 25 2021     (L) 06/18/2021    K 3.8 06/18/2021    CL 84 (LL) 06/18/2021    CREATININE 0.60 06/18/2021    BUN 38 (H) 06/18/2021    CO2 >45 (HH) 06/18/2021    TSH 3.32 06/14/2021    INR 1.0 08/27/2019    GLUF 91 09/15/2020     ASSESSMENT:    Principal Problem:    Acute on chronic respiratory failure with hypoxia and hypercapnia (HCC)  Active Problems:    Pulmonary hypertension due to hypoxia (HCC) /  interstitial lung disease related    IPF (idiopathic pulmonary fibrosis) (HCC)    Severe malnutrition (HCC)    Respiratory acidosis from CO2 retention with Interstitial Lung Dz. Transient alteration of awareness    COPD exacerbation ([address]  Resolved Problems:    * No resolved hospital problems. *      Patient Active Problem List    Diagnosis Date Noted    Respiratory acidosis from CO2 retention with Interstitial Lung Dz. 06/09/2021    Transient alteration of awareness 06/09/2021    COPD exacerbation ([address] 06/09/2021    Major depressive disorder with single episode, in full remission ([address] 01/22/2021    Severe malnutrition ([address] 08/28/2019    BAYRON (obstructive sleep apnea) 11/02/2017    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 11/02/2017    Chronic cor pulmonale ([address] 11/02/2017    Pulmonary hypertension due to hypoxia [facility]) /  interstitial lung disease related 11/02/2017    IPF (idiopathic pulmonary fibrosis) ([address] 11/02/2017    Acute on chronic respiratory failure with hypoxia and hypercapnia (HCC) 08/11/2017    Chronic interstitial lung disease ([address] 06/23/2017    Kyphoscoliosis 05/01/2017    Hypothyroidism        PLAN:  Principal Problem:    Acute on chronic respiratory failure with hypoxia and hypercapnia (HCC)  Active Problems:    Pulmonary hypertension due to hypoxia (HCC) /  interstitial lung disease related    IPF (idiopathic pulmonary fibrosis) (HCC)    Severe malnutrition ([address]    Respiratory acidosis from CO2 retention with Interstitial Lung Dz.     Transient alteration of awareness    COPD exacerbation [facility])  Resolved Problems:    * No resolved hospital problems. *    · Acute on chronic respiratory failure withy hypercapnia, respiratory acidosis with metabolic encephalopathy  ? Appreciate pulmonology  ? Trilogy from home  ? Wean supplemental O2 to keep SpO2 around 88%  ? Trend ABG's-stable  ? Stop cefepime  ? Repeat chest x-ray -no change from previous  ? I I spoke to Dr. Jaswinder Anderson with pulmonology yesterday to discuss possibility of either trilogy or hospice per their recommendation. Patient was started on trilogy in May 2021. He stated at this time trilogy or BiPAP does not really matter however we need to think about quality of life at this point. He does agree that hospice would be a good opportunity for her unless she wants to live on a mask every day. · Pulmonary fibrosis  ? Continue Plaquenil  · BAYRON on CPAP  ? Continue home trilogy  · DVT prophylaxis: Lovenox   · Nutrition status: malnutrition  ?  Dietician consult initiated  · High risk medications: none   · Discharge plan: Home with hospice today    Luis Felipe Ortega, APRN - CNP, APRN, NP-C  6/18/2021, 11:24 AM

## 2021-06-18 NOTE — DISCHARGE SUMMARY
Discharge Summary    Darien Marina  :  1950  MRN:  [id number]    Admit date:  2021      Discharge date: 2021     Admitting Physician:  Leisa Guerrier MD    Discharge Diagnoses:    Principal Problem:    Acute on chronic respiratory failure with hypoxia and hypercapnia ([facility]  Active Problems:    Pulmonary hypertension due to hypoxia [facility]) /  interstitial lung disease related    IPF (idiopathic pulmonary fibrosis) ([facility]    Severe malnutrition ([facility]    Respiratory acidosis from CO2 retention with Interstitial Lung Dz. Transient alteration of awareness    COPD exacerbation ([facility]  Resolved Problems:    * No resolved hospital problems. *      Hospital Course:   Darien Marina is a 73 y.o. female admitted with acute on chronic respiratory failure with hypoxia and hypercapnia. She presented to the emergency room with confusion and altered mental status. Patient has severe pulmonary fibrosis with chronic hypoxemia and respiratory failure and had been on 4 L per nasal cannula at home. Patient recently was prescribed trilogy at home starting in May by Dr. Arturo Burrows. Patient does have history of CO2 retention in the past.  During her evaluation in the emergency room her CO2 was elevated at 125 with a pH of 7.312, PO2 of 29.9 and PCO2 of 125.2. Patient was placed on BiPAP and admitted to ICU. During discussions with the patient her mental status improved. Pulmonology was consulted medications were adjusted. She was placed on IV cefepime for some bronchiectasis for short time. Hemodynamically she is stable. Her ABGs are stable at this time. Patient changed her CODE STATUS to a DO NOT RESUSCITATE Comfort Care arrest and wished that no heroic measures were taken to save her life. Did have conversations with both her and her family regarding hospice care. They met with [facility] and signed in. Patient will be discharged home today with [facility].   She will continue her trilogy and medications as previous. Consultants:  Dr. Jazzy Gottlieb, pulmonology    Procedures: none    Complications: none    Discharge Condition: fair    Exam:  GEN:  alert and oriented to person, place and time, alert and frail-appearing  EYES:  No gross abnormalities. , PERRL and EOMI  NECK: normal, supple, no lymphadenopathy,  no carotid bruits  PULM: clear to auscultation bilaterally- no wheezes, rales or rhonchi, normal air movement, no respiratory distress  COR:   regular rate & rhythm, no murmurs, no gallops, S1 normal and S2 normal  ABD:    soft, non-tender, non-distended, normal bowel sounds, no masses or organomegaly  EXT:    no cyanosis, clubbing or edema present    NEURO: follows commands, BALDWIN, no deficits  SKIN:   no rashes or significant lesions    Significant Diagnostic Studies:   Lab Results   Component Value Date    WBC 7.0 06/12/2021    HGB 11.2 (L) 06/12/2021    PLT See Reflexed IPF Result 06/12/2021       Lab Results   Component Value Date    BUN 38 (H) 06/18/2021    CREATININE 0.60 06/18/2021     (L) 06/18/2021    K 3.8 06/18/2021    CALCIUM 8.9 06/18/2021    CL 84 (LL) 06/18/2021    CO2 >45 (HH) 06/18/2021    LABGLOM >60 06/18/2021       Lab Results   Component Value Date    WBCUA 5 TO 10 08/28/2019    RBCUA 0 TO 2 08/28/2019    EPITHUA 2 TO 5 08/28/2019    LEUKOCYTESUR SMALL (A) 08/28/2019    SPECGRAV 1.020 08/28/2019    GLUCOSEU NEGATIVE 08/28/2019    KETUA NEGATIVE 08/28/2019    PROTEINU NEGATIVE 08/28/2019    HGBUR TRACE (A) 08/28/2019    CASTUA NOT REPORTED 08/28/2019    CRYSTUA NOT REPORTED 08/28/2019    BACTERIA TRACE (A) 08/28/2019    YEAST NOT REPORTED 08/28/2019       XR CHEST (2 VW)    Result Date: 6/10/2021  EXAMINATION: TWO XRAY VIEWS OF THE CHEST 6/10/2021 10:26 am COMPARISON: January 26, 2018 HISTORY: ORDERING SYSTEM PROVIDED HISTORY: shortness of breath TECHNOLOGIST PROVIDED HISTORY: shortness of breath FINDINGS: Stable mild enlargement of the cardiac silhouette.   Right upper lobe consolidation. Background reticulation in the right lung and left supra hilum is a chronic finding which has not appreciably changed. No pneumothorax. Scoliosis with rightward curvature of the mid and lower thoracic spine. 1. Right upper lobe consolidation. In the proper clinical setting, finding would be compatible with pneumonia. Recommend follow-up chest radiograph 6-8 weeks post completion of treatment to ensure resolution. If finding persists at that time, CT of the chest would be recommended. . 2. Chronic interstitial reticulation. 3. Stable mild enlargement of the cardiac silhouette. CT CHEST PULMONARY EMBOLISM W CONTRAST    Result Date: 6/10/2021  EXAMINATION: CTA OF THE CHEST 6/10/2021 2:45 pm TECHNIQUE: CTA of the chest was performed after the administration of intravenous contrast.  Multiplanar reformatted images are provided for review. MIP images are provided for review. Dose modulation, iterative reconstruction, and/or weight based adjustment of the mA/kV was utilized to reduce the radiation dose to as low as reasonably achievable. COMPARISON: Chest radiograph today. Chest CT 08/27/2019 HISTORY: ORDERING SYSTEM PROVIDED HISTORY: hypoxia TECHNOLOGIST PROVIDED HISTORY: hypoxia FINDINGS: Pulmonary Arteries: Pulmonary arteries are adequately opacified for evaluation. No evidence of intraluminal filling defect to suggest pulmonary embolism. Main pulmonary artery is enlarged measuring 37 mm. Mediastinum: No evidence of mediastinal lymphadenopathy. The heart and pericardium demonstrate no acute abnormality. There is no acute abnormality of the thoracic aorta. Lungs/pleura: Findings of pulmonary fibrosis are again demonstrated, right greater than left. No focal area of consolidation. Prominent interstitial opacities in the lungs, right greater than left, are again noted and are overall less confluent since the 2019 exam.  No effusion. The central airway is patent.  Upper Abdomen: No acute findings. Soft Tissues/Bones: No acute bone or soft tissue abnormality. Thoracolumbar scoliosis. 1.  No evidence of pulmonary embolism. 2.  Findings of pulmonary fibrosis and associated interstitial lung disease again demonstrated. No focal area of consolidation identified.        Assessment and Plan:  Patient Active Problem List    Diagnosis Date Noted    Respiratory acidosis from CO2 retention with Interstitial Lung Dz. 06/09/2021    Transient alteration of awareness 06/09/2021    COPD exacerbation ([facility] 06/09/2021    Major depressive disorder with single episode, in full remission ([facility] 01/22/2021    Severe malnutrition ([facility] 08/28/2019    BAYRON (obstructive sleep apnea) 11/02/2017    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 11/02/2017    Chronic cor pulmonale ([facility] 11/02/2017    Pulmonary hypertension due to hypoxia [facility]) /  interstitial lung disease related 11/02/2017    IPF (idiopathic pulmonary fibrosis) ([facility] 11/02/2017    Acute on chronic respiratory failure with hypoxia and hypercapnia ([facility]) 08/11/2017    Chronic interstitial lung disease ([facility] 06/23/2017    Kyphoscoliosis 05/01/2017    Hypothyroidism         Discharge Medications:       [facility] Medication Instructions QMD:[id number]    Printed on:06/18/21 1126   Medication Information                      albuterol (PROVENTIL) (2.5 MG/3ML) 0.083% nebulizer solution  Take 3 mLs by nebulization every 6 hours as needed for Wheezing Nebulizer treatment [id number]             Calcium Carbonate-Vitamin D (OYSTER SHELL CALCIUM/D) 500-200 MG-UNIT TABS  2 tabs daily             fluticasone (FLONASE) 50 MCG/ACT nasal spray  1 spray by Nasal route daily             furosemide (LASIX) 20 MG tablet  Take 20 mg by mouth daily             hydroxychloroquine (PLAQUENIL) 200 MG tablet  Take 200 mg by mouth daily              ibuprofen (ADVIL;MOTRIN) 600 MG tablet  Take 1 tablet by mouth every 6 hours as needed for Pain levothyroxine (SYNTHROID) 175 MCG tablet  Take 1 tablet by mouth Daily             metOLazone (ZAROXOLYN) 2.5 MG tablet  Take 2.5 mg by mouth three times a week Please take 30-45 minutes before morning furosemide / Lasix. Patient takes Monday, Wednesday, Friday             pantoprazole (PROTONIX) 40 MG tablet  Take 1 tablet by mouth daily as needed             potassium chloride (KLOR-CON M) 20 MEQ extended release tablet  Take 1 tablet by mouth daily             sertraline (ZOLOFT) 50 MG tablet  Take 1 tablet by mouth daily             spironolactone (ALDACTONE) 25 MG tablet  Take 1 tablet by mouth daily                 Patient Instructions:    Activity: activity as tolerated  Diet: regular diet  Wound Care: none needed  Other: None    Disposition:   DC to home with hospice    Follow up:  Patient will be followed by Kenji Cary MD in 1-2 weeks    CORE MEASURES on Discharge (if applicable)  ACE/ARB in CHF: NA  Statin in MI: NA  ASA in MI: NA  Statin in CVA: NA  Antiplatelet in CVA: NA    Total time spent on discharge services: 45 minutes    Including the following activities:  Evaluation and Management of patient  Discussion with patient and/or surrogate about current care plan  Coordination with Case Management and/or   Coordination of care with Consultants (if applicable)   Coordination of care with Receiving Facility Physician (if applicable)  Completion of DME forms (if applicable)  Preparation of Discharge Summary  Preparation of Medication Reconciliation  Preparation of Discharge Prescriptions    Signed:  MANJINDER Godinez CNP, MANJINDER, NP-C  6/18/2021, 11:26 AM

## 2021-06-18 NOTE — PROGRESS NOTES
Patient participates with PT this am, assisted up into chair at this time. Breakfast provided. Patient assessed and morning medications administered. Respirations remain even and unlabored. SpO2 92% on 1L NC. Patient denies c/o's or needs at this time. Call light in reach.

## 2021-06-18 NOTE — PROGRESS NOTES
assistance;Contact guard assistance  Stand to sit: Stand by assistance;Contact guard assistance  Comment: Minimal cues for hand placement with good understanding noted. Ambulation  Ambulation?: Yes  Ambulation 1  Surface: level tile  Device: Small Jai Jewell  Other Apparatus: O2  Assistance: Contact guard assistance;Stand by assistance  Distance: 20 feet x 2  Comments: Verbal cues for posture and safety with turns/AD. Pt with good understanding of cues. SPO2 86% after amb and increased to 92-93% after 2-3 min seated rest break. Cues for proper breathing technique and good understanding noted. Stairs/Curb  Stairs?: No     Balance  Posture: Fair  Sitting - Static: Good  Sitting - Dynamic: Good  Standing - Static: Fair;+  Standing - Dynamic: Fair     G-Code     OutComes Score    AM-PAC Score    Goals  Short term goals  Time Frame for Short term goals: 20 days  Short term goal 1: Pt will be educated on her POC  Short term goal 2: Pt will perform all transfers Mod I inorder toincrease independence  Short term goal 3: Pt will ambulate 100 feet with LRAD Mod I in order to return to PLOF  Short term goal 4: Pt will increase dynamic standing balance to Good in order to reduce fall risk  Patient Goals   Patient goals : \"to get better and go home\"    Plan    Plan  Times per week: 7x/wk  Times per day: Twice a day  Plan weeks: 2x/daily except weekends 1x/daily  Current Treatment Recommendations: Strengthening, Neuromuscular Re-education, Home Exercise Program, Manual Therapy - Soft Tissue Mobilization, Safety Education & Training, Balance Training, Endurance Training, Patient/Caregiver Education & Training, Functional Mobility Training, Transfer Training, Gait Training  Safety Devices  Type of devices:  All fall risk precautions in place, Call light within reach, Chair alarm in place, Nurse notified, Left in chair, Gait belt     Therapy Time   Individual Concurrent Group Co-treatment   Time In 0930         Time Out 9630 Minutes [address] [place], [place]

## 2021-06-18 NOTE — PROGRESS NOTES
Patient discharged to home with family at this time, transports via w/c. Patient's portable oxygen utilized.

## 2021-06-18 NOTE — PROGRESS NOTES
Palliative Care Follow Up    Sulaiman Lamas is resting in the chair for our discussion. She is bright and cheerful this morning States that she is excited that she will be going home today. Her spouse is supposed to pick her up around 1 pm today. She denies any concerns about going home with hospice. She feels that her  is more at ease. \"They delivered the hospital bed so its all ready to go. \"  Reports that she slept with the Trilogy on all night last night and had \"no more issues than normal.\" States that she had a C-pap prior to the trilogy and is used to having to adjust the mask. Her only concern is that she is \"still losing weight. \" Her breakfast tray is in front of her with the supplement unopened. Discussed that it would benefit her to drink it. Discussed options for changing flavors or using it in a shake, or exploring other brands. States that she will have to try different things at home. She reports brief periods of nausea, \"I just get so full so fast.\" Suggested eating every few hours. States that this is what she normally does at home. Denies further needs at this time.      [address] Nurse Coordinator  6/18/2021 11:03 AM

## 2021-06-18 NOTE — PROGRESS NOTES
Pt refused ABG this AM. Educated pt on reasoning for purpose of the test. Pt acknowledge it and is okay with refusing the ABG draw still.

## 2021-06-18 NOTE — PLAN OF CARE
Problem: Falls - Risk of:  Goal: Will remain free from falls  Description: Will remain free from falls  6/17/2021 2249 by Robert Alfaro RN  Outcome: Ongoing  Note: Fall risk assessment done and patient is a high risk for falls. Alarms on as needed for patient safety. Patient being monitored on a regular basis. No falls noted at this time. 6/17/2021 1127 by Tracey Koroma RN  Outcome: Ongoing  Note: Patient is alert and oriented and has demonstrated the use of the call light for assistance before getting up. Education has been provided to defer the use of the bed alarm and/or restraint free alarm as the patient has shown competency in calling for assistance and verbalizing the risk. Goal: Absence of physical injury  Description: Absence of physical injury  Outcome: Ongoing     Problem: Skin Integrity:  Goal: Will show no infection signs and symptoms  Description: Will show no infection signs and symptoms  6/17/2021 2249 by Robert Alfaro RN  Outcome: Ongoing  Note: Patient is able to turn self as needed. No new open areas or redness noted. Will continue to assess     6/17/2021 1127 by Tracey Koroma RN  Outcome: Ongoing  Note: NO s/s infection noted. Goal: Absence of new skin breakdown  Description: Absence of new skin breakdown  6/17/2021 2249 by Robert Alfaro RN  Outcome: Ongoing  6/17/2021 1127 by Tracey Koroma RN  Outcome: Ongoing  Note: Skin assessment performed at regular intervals. Buttocks pink, no open areas noted. Pt able to turn self, assistance provided when needed. Will continue to monitor patient closely for breakdown.   Goal: Demonstration of wound healing without infection will improve  Description: Demonstration of wound healing without infection will improve  Outcome: Ongoing  Goal: Complications related to intravenous access or infusion will be avoided or minimized  Description: Complications related to intravenous access or infusion will be avoided or minimized  Outcome: Ongoing     Problem: Nutrition  Goal: Optimal nutrition therapy  Outcome: Ongoing     Problem: OXYGENATION/RESPIRATORY FUNCTION  Goal: Patient will maintain patent airway  6/17/2021 2249 by Dora Lincoln RN  Outcome: Ongoing  Note: Lung sounds are clear to diminished in bases. Will continue to monitor. 6/17/2021 1127 by Hunter Martinez RN  Outcome: Ongoing  Note: Patent airway maintained. Spo2 maintained low 90s on 1L of oxygen via nc. Goal: Patient will achieve/maintain normal respiratory rate/effort  Description: Respiratory rate and effort will be within normal limits for the patient  Outcome: Ongoing     Problem: SKIN INTEGRITY  Goal: Skin integrity is maintained or improved  6/17/2021 2249 by oDra Lincoln RN  Outcome: Ongoing  Note: Patient is able to turn self as needed. No new open areas or redness noted. Will continue to assess     6/17/2021 1127 by Hunter Martinez RN  Outcome: Ongoing  Note: Skin assessment performed at regular intervals. No redness or open areas noted. Pt able to turn self, assistance provided when needed. Will continue to monitor patient closely for breakdown. Problem: NUTRITION  Goal: Nutritional status is improving  Outcome: Ongoing     Problem: Nutrition Deficit:  Goal: Ability to achieve adequate nutritional intake will improve  Description: Ability to achieve adequate nutritional intake will improve  6/17/2021 2249 by Dora Lincoln RN  Outcome: Ongoing  6/17/2021 1127 by Hunter Martinez RN  Outcome: Ongoing  Note: Patient tolerates po food and fluids. High mellissa supplement given with meals, taken well.       Problem: Nutritional:  Goal: Nutritional status will improve  Description: Nutritional status will improve  Outcome: Ongoing     Problem: Physical Regulation:  Goal: Diagnostic test results will improve  Description: Diagnostic test results will improve  Outcome: Ongoing  Goal: Will remain free from infection  Description: Will remain free from infection  Outcome: Ongoing  Goal: Ability to maintain vital signs within normal range will improve  Description: Ability to maintain vital signs within normal range will improve  Outcome: Ongoing     Problem: Respiratory:  Goal: Ability to maintain normal respiratory secretions will improve  Description: Ability to maintain normal respiratory secretions will improve  Outcome: Ongoing  Note: RR are regular and unlabored. Will continue to monitor. Problem: Gas Exchange - Impaired:  Goal: Levels of oxygenation will improve  Description: Levels of oxygenation will improve  Outcome: Ongoing  Note: [email or web address] on 1L O2. Trilogy is worn when patient is sleeping. Will continue to monitor. Problem: Coping:  Goal: Ability to remain calm will improve  Description: Ability to remain calm will improve  Outcome: Ongoing     Problem: Safety:  Goal: Ability to remain free from injury will improve  Description: Ability to remain free from injury will improve  Outcome: Ongoing  Note: Bed in lowest position, wheels are locked, call light within reach, ID band on. Fall risk is assessed. Will continue to monitor.         Problem: Self-Care:  Goal: Ability to participate in self-care as condition permits will improve  Description: Ability to participate in self-care as condition permits will improve  Outcome: Ongoing

## 2022-08-25 NOTE — ED NOTES
Awaiting [facility] RN to arrive to take this patient.  Pt and her spouse are aware       Wyatt Garcia RN  11/02/17 2919 Patient requesting refill on phenergan.